# Patient Record
Sex: FEMALE | Race: WHITE | NOT HISPANIC OR LATINO | Employment: OTHER | URBAN - METROPOLITAN AREA
[De-identification: names, ages, dates, MRNs, and addresses within clinical notes are randomized per-mention and may not be internally consistent; named-entity substitution may affect disease eponyms.]

---

## 2017-05-26 ENCOUNTER — OFFICE VISIT - HEALTHEAST (OUTPATIENT)
Dept: CARDIOLOGY | Facility: CLINIC | Age: 77
End: 2017-05-26

## 2017-05-26 DIAGNOSIS — I48.0 PAROXYSMAL ATRIAL FIBRILLATION (H): ICD-10-CM

## 2017-05-26 DIAGNOSIS — G47.33 OSA ON CPAP: ICD-10-CM

## 2017-05-26 DIAGNOSIS — I48.92 ATRIAL FLUTTER, UNSPECIFIED TYPE (H): ICD-10-CM

## 2017-05-26 DIAGNOSIS — I10 ESSENTIAL HYPERTENSION WITH GOAL BLOOD PRESSURE LESS THAN 130/80: ICD-10-CM

## 2017-05-26 LAB
ATRIAL RATE - MUSE: 61 BPM
DIASTOLIC BLOOD PRESSURE - MUSE: NORMAL MMHG
INTERPRETATION ECG - MUSE: NORMAL
P AXIS - MUSE: 37 DEGREES
PR INTERVAL - MUSE: 184 MS
QRS DURATION - MUSE: 96 MS
QT - MUSE: 450 MS
QTC - MUSE: 453 MS
R AXIS - MUSE: 25 DEGREES
SYSTOLIC BLOOD PRESSURE - MUSE: NORMAL MMHG
T AXIS - MUSE: 33 DEGREES
VENTRICULAR RATE- MUSE: 61 BPM

## 2017-05-26 RX ORDER — VALSARTAN 160 MG/1
160 TABLET ORAL DAILY
Refills: 0 | Status: SHIPPED
Start: 2017-05-26 | End: 2021-09-01

## 2017-05-26 ASSESSMENT — MIFFLIN-ST. JEOR: SCORE: 1105.46

## 2019-06-03 ENCOUNTER — OFFICE VISIT - HEALTHEAST (OUTPATIENT)
Dept: CARDIOLOGY | Facility: CLINIC | Age: 79
End: 2019-06-03

## 2019-06-03 DIAGNOSIS — I48.4 ATYPICAL ATRIAL FLUTTER (H): ICD-10-CM

## 2019-06-03 DIAGNOSIS — Z98.890 STATUS POST CATHETER ABLATION OF ATRIAL FIBRILLATION: ICD-10-CM

## 2019-06-03 DIAGNOSIS — I48.0 PAROXYSMAL ATRIAL FIBRILLATION (H): ICD-10-CM

## 2019-06-03 DIAGNOSIS — I10 ESSENTIAL HYPERTENSION: ICD-10-CM

## 2019-06-03 DIAGNOSIS — G47.33 OSA ON CPAP: ICD-10-CM

## 2019-06-03 ASSESSMENT — MIFFLIN-ST. JEOR: SCORE: 1037.42

## 2019-06-04 ENCOUNTER — AMBULATORY - HEALTHEAST (OUTPATIENT)
Dept: CARDIOLOGY | Facility: CLINIC | Age: 79
End: 2019-06-04

## 2019-06-10 ENCOUNTER — COMMUNICATION - HEALTHEAST (OUTPATIENT)
Dept: CARDIOLOGY | Facility: CLINIC | Age: 79
End: 2019-06-10

## 2019-06-10 DIAGNOSIS — I48.0 PAROXYSMAL ATRIAL FIBRILLATION (H): ICD-10-CM

## 2019-10-31 ENCOUNTER — COMMUNICATION - HEALTHEAST (OUTPATIENT)
Dept: CARDIOLOGY | Facility: CLINIC | Age: 79
End: 2019-10-31

## 2019-10-31 DIAGNOSIS — I48.0 PAROXYSMAL ATRIAL FIBRILLATION (H): ICD-10-CM

## 2019-11-01 ENCOUNTER — COMMUNICATION - HEALTHEAST (OUTPATIENT)
Dept: CARDIOLOGY | Facility: CLINIC | Age: 79
End: 2019-11-01

## 2019-11-01 DIAGNOSIS — I48.0 PAROXYSMAL ATRIAL FIBRILLATION (H): ICD-10-CM

## 2019-11-01 RX ORDER — SOTALOL HYDROCHLORIDE 80 MG/1
80 TABLET ORAL 2 TIMES DAILY
Qty: 180 TABLET | Refills: 3 | Status: SHIPPED | OUTPATIENT
Start: 2019-11-01 | End: 2021-10-05

## 2019-11-04 ENCOUNTER — AMBULATORY - HEALTHEAST (OUTPATIENT)
Dept: CARDIOLOGY | Facility: CLINIC | Age: 79
End: 2019-11-04

## 2019-11-04 ENCOUNTER — SURGERY - HEALTHEAST (OUTPATIENT)
Dept: CARDIOLOGY | Facility: CLINIC | Age: 79
End: 2019-11-04

## 2019-11-04 DIAGNOSIS — I48.0 PAROXYSMAL ATRIAL FIBRILLATION (H): ICD-10-CM

## 2019-11-04 DIAGNOSIS — I48.4 ATYPICAL ATRIAL FLUTTER (H): ICD-10-CM

## 2019-11-04 ASSESSMENT — MIFFLIN-ST. JEOR: SCORE: 1021.99

## 2019-11-06 ENCOUNTER — AMBULATORY - HEALTHEAST (OUTPATIENT)
Dept: CARDIOLOGY | Facility: CLINIC | Age: 79
End: 2019-11-06

## 2019-11-06 LAB
ATRIAL RATE - MUSE: 141 BPM
DIASTOLIC BLOOD PRESSURE - MUSE: NORMAL
INTERPRETATION ECG - MUSE: NORMAL
P AXIS - MUSE: NORMAL
PR INTERVAL - MUSE: NORMAL
QRS DURATION - MUSE: 88 MS
QT - MUSE: 386 MS
QTC - MUSE: 538 MS
R AXIS - MUSE: -14 DEGREES
SYSTOLIC BLOOD PRESSURE - MUSE: NORMAL
T AXIS - MUSE: 37 DEGREES
VENTRICULAR RATE- MUSE: 117 BPM

## 2019-11-08 ENCOUNTER — AMBULATORY - HEALTHEAST (OUTPATIENT)
Dept: CARDIOLOGY | Facility: CLINIC | Age: 79
End: 2019-11-08

## 2019-11-11 ENCOUNTER — ANESTHESIA - HEALTHEAST (OUTPATIENT)
Dept: CARDIOLOGY | Facility: CLINIC | Age: 79
End: 2019-11-11

## 2019-12-04 ENCOUNTER — OFFICE VISIT - HEALTHEAST (OUTPATIENT)
Dept: CARDIOLOGY | Facility: CLINIC | Age: 79
End: 2019-12-04

## 2019-12-04 DIAGNOSIS — I48.0 PAROXYSMAL ATRIAL FIBRILLATION (H): ICD-10-CM

## 2019-12-04 DIAGNOSIS — I48.4 ATYPICAL ATRIAL FLUTTER (H): ICD-10-CM

## 2019-12-04 DIAGNOSIS — G47.33 OSA ON CPAP: ICD-10-CM

## 2019-12-04 DIAGNOSIS — Z98.890 STATUS POST CATHETER ABLATION OF ATRIAL FIBRILLATION: ICD-10-CM

## 2019-12-04 DIAGNOSIS — I10 ESSENTIAL HYPERTENSION: ICD-10-CM

## 2019-12-04 ASSESSMENT — MIFFLIN-ST. JEOR: SCORE: 1014.74

## 2019-12-10 ENCOUNTER — COMMUNICATION - HEALTHEAST (OUTPATIENT)
Dept: CARDIOLOGY | Facility: CLINIC | Age: 79
End: 2019-12-10

## 2020-07-07 ENCOUNTER — COMMUNICATION - HEALTHEAST (OUTPATIENT)
Dept: CARDIOLOGY | Facility: CLINIC | Age: 80
End: 2020-07-07

## 2020-07-08 ENCOUNTER — OFFICE VISIT - HEALTHEAST (OUTPATIENT)
Dept: CARDIOLOGY | Facility: CLINIC | Age: 80
End: 2020-07-08

## 2020-07-08 DIAGNOSIS — I10 ESSENTIAL HYPERTENSION: ICD-10-CM

## 2020-07-08 DIAGNOSIS — G47.33 OSA ON CPAP: ICD-10-CM

## 2020-07-08 DIAGNOSIS — I48.3 TYPICAL ATRIAL FLUTTER (H): ICD-10-CM

## 2020-07-08 DIAGNOSIS — Z98.890 STATUS POST CATHETER ABLATION OF ATRIAL FIBRILLATION: ICD-10-CM

## 2020-07-08 DIAGNOSIS — I48.0 PAROXYSMAL ATRIAL FIBRILLATION (H): ICD-10-CM

## 2020-07-08 RX ORDER — DULAGLUTIDE 0.75 MG/.5ML
INJECTION, SOLUTION SUBCUTANEOUS
Status: SHIPPED | COMMUNITY
Start: 2020-02-06 | End: 2021-09-01

## 2020-07-08 RX ORDER — SPIRONOLACTONE AND HYDROCHLOROTHIAZIDE 25; 25 MG/1; MG/1
TABLET ORAL DAILY
Status: SHIPPED | COMMUNITY
Start: 2020-05-26

## 2020-07-08 ASSESSMENT — MIFFLIN-ST. JEOR: SCORE: 1023.87

## 2021-05-29 NOTE — PROGRESS NOTES
[facility] HEART [facility]  Arrhythmia Clinic  Timur Corona    Referring:      Assessment:         Paroxysmal atrial fibrillation: The patient is 8 years out from her ablation procedure to treat her atrial fibrillation.  She has had some recurrence of atypical atrial flutter but none recently.  Her sotalol has gradually been weaned back.  The patient wore a 2-week monitor as part of an evaluation for episode of syncope.  She demonstrated no sustained atrial fibrillation or flutter.  She has an elevated XXX1OU0-ODGg score and remains on warfarin therapy.  She has not had any problems on this medication.    Syncope: Most likely vasovagal based on the patient's description.  There were no findings at the time of her work-up.  I have recommended the patient maintain a good daily hydration status which she admits is relatively poor currently.    Essential hypertension: The patient's blood pressure is at target on her current medical therapy    Obstructive sleep apnea: The patient remains compliant with her CPAP therapy.      Recommendations:    Patient's sotalol dose will be decreased from 120 mg twice a day to 80 mg twice a day.  She will contact us if she has recurrent symptomatic atrial flutter or fibrillation.    No change in her other medical therapy.    Follow-up in the A. fib clinic with the EP nurse practitioner in 4-6 months.        Patient Active Problem List   Diagnosis     Esophageal Reflux     Essential hypertension     Paroxysmal Atrial Fibrillation     Atrial flutter (H)     SHAVON on CPAP     Type 2 Diabetes Mellitus     Dyslipidemia     Status post catheter ablation of atrial fibrillation       Subjective:  Willow Fatima (79 y.o. female) returns to the arrhythmia clinic for interval reevaluation of her rhythm status post ablation.  The patient is approximately 8 years out from her ablation procedure to treat her atrial fibrillation.  Overall she is done well however she did have recurrence of an  atypical atrial flutter but has been successfully suppressed with sotalol therapy.  She notes no side effects from this medication which was decreased about 18 months ago.  She continues on warfarin therapy without any problems.  She states that her INRs are very steady and that she has had no bleeding problems or any other difficulties with falls or other injuries.    Current Outpatient Medications   Medication Sig Dispense Refill     allopurinol (ZYLOPRIM) 100 MG tablet Take 100 mg by mouth 2 (two) times a day.              atorvastatin (LIPITOR) 40 MG tablet Take 40 mg by mouth daily. As directed       exenatide microspheres 2 mg/0.65 mL PnIj Inject 2 mg under the skin.       glipiZIDE (GLUCOTROL) 10 MG tablet Take 10 mg by mouth 2 (two) times a day.        hydrochlorothiazide (HYDRODIURIL) 25 MG tablet Take 25 mg by mouth daily.       KLOR-CON M10 10 mEq tablet Take 10 mEq by mouth daily.  0     warfarin (COUMADIN) 2.5 MG tablet Adjust dose based on INR results as directed.       sotalol (BETAPACE) 80 MG tablet Take 1 tablet (80 mg total) by mouth 2 (two) times a day. 180 tablet 3     TRADJENTA 5 mg Tab   5     valsartan (DIOVAN) 160 MG tablet Take 1 tablet (160 mg total) by mouth daily.  0     No current facility-administered medications for this visit.        Review of Systems:   General: WNL  Eyes: WNL  Ears/Nose/Throat: WNL  Lungs: WNL  Heart: WNL  Stomach: WNL  Bladder: WNL  Muscle/Joints: WNL  Skin: WNL  Nervous System: WNL  Mental Health: WNL     Blood: WNL    Family History  Family History   Problem Relation Age of Onset     Aneurysm Mother         brain     Emphysema Father      Lung cancer Sister      Hypertension Brother      Lung cancer Sister      Lung cancer Sister      Breast cancer Sister        Social History   reports that she quit smoking about 57 years ago. She has never used smokeless tobacco. She reports that she drinks alcohol. She reports that she does not use drugs.    Objective:   Vital  "signs in last 24 hours:  /78 (Patient Site: Right Arm, Patient Position: Sitting, Cuff Size: Adult Large)   Pulse 72   Resp 20   Ht 5' 1\" (1.549 m)   Wt 140 lb (63.5 kg)   BMI 26.45 kg/m    Weight: Weight: 140 lb (63.5 kg)     Physical Exam:  General: The patient is alert oriented to person place and situation.  The patient is in no acute distress at the time of my evaluation.  Eyes: Pupils are equal, round, and reactive to light.  Conjunctiva and sclera are clear.  ENT: Oral mucosa is moist and without redness. No evident nasal discharge.  Pulmonary: Lungs are clear bilaterally with no rales, rhonchi, or wheezes.    Cardiovascular exam: Rhythm is regular. S1 and S2 are normal. No significant murmur is present. JVP is normal. Lower extremities demonstrate no significant edema. Distal pulses are intact bilaterally.  Abdomen is flat, soft, and nontender.  Musculoskeletal: Spine is straight. Extremities without deformity.  Neuro: Gait is normal.   Skin is warm, dry, and otherwise intact.      Cardiographics:   I reviewed the patient's Zio patch recording.  This demonstrated normal sinus rhythm with occasional short runs of ectopic atrial tachycardia.  Overall average heart rates were normal.  No significant bradycardia or pauses.  No atrial fibrillation or flutter.    Lab Results:   Lab Results   Component Value Date     05/26/2017    K 3.8 05/26/2017     05/26/2017    CO2 23 05/26/2017    BUN 22 05/26/2017    CREATININE 1.09 05/26/2017    CALCIUM 9.7 05/26/2017     Lab Results   Component Value Date    WBC 8.2 07/20/2011    HGB 12.5 07/20/2011    HCT 36.6 07/20/2011    MCV 87 07/20/2011     07/20/2011     Lab Results   Component Value Date    INR 2.52 (H) 07/22/2011         Outside record review:        "

## 2021-05-29 NOTE — TELEPHONE ENCOUNTER
Return call from patient.  Clarified that she should be taking 80 mg once daily and she confirms and states understanding.  She has a large supply of 80 mg tablets at home.  Discussed maintaining good hydration, especially in the beginning of the day, and she states understanding.  Pt to call with episodes of afib and reviewed contact information.

## 2021-05-29 NOTE — TELEPHONE ENCOUNTER
Patient phone note reviewed.  The symptoms reported by the patient indicate that this was not consistent with her previous atrial fibrillation or flutter symptoms.  The increased heart rate is likely secondary to the decrease in beta-blocker dose.  I also suspect that she may have some degree of dehydration that when encouraged her to increase her daily fluid intake.  Tell her that it is going to take roughly 1-2 weeks for her to adjust to the lower dose.  If she thinks she is gone back in atrial fibrillation would like to try and document that with a twelve-lead EKG.

## 2021-05-29 NOTE — TELEPHONE ENCOUNTER
Dr. Corona,     Upon further discussion, Tang changed the patients sotalol dosage to 120mg DAILY 2 years ago.  So when she saw you recently she was only taking 120mg once daily.  She has now decreased her dose further to 80mg daily confused by your instructions.  She denies any afib but doesn't have the energy she thought she would.    Now knowing she's only been on 120mg daily for a long period of time, what would you like her dose to be?    Thank you  Lindsay

## 2021-05-29 NOTE — TELEPHONE ENCOUNTER
Telephone call reviewed.  Medication clarification.  The patient had been taking sotalol 120 mg once daily.  Change her dose to 80 mg once daily.  Please reinforce to the patient that she needs to increase her fluid intake daily with a concentration on the first half of the day.

## 2021-05-29 NOTE — TELEPHONE ENCOUNTER
Dr. Corona,     Patient was seen 6/3/19 for paroxysmal afib s/p ablation 8 years ago.     Plan was to decrease sotalol from 120mg two times a day to 80mg two times a day.     Patient is calling to report significant symptoms after decreasing her dose.     She decreased her dose after seeing you on 6/3.  On 6/4 and 6/5 she was cold, clammy and weak, pulse with activity was 99.  Patient went back to her previous dose of sotalol 120mg two times a day on 6/5 and has felt well since.  When questioned, she didn't have afib or aflutter but just didn't feel good.    She is willing to try again to go back down to 80mg two times a day but is concerned she won't feel well again.    Thoughts/recommendations?    Thanks  Lindsay

## 2021-05-29 NOTE — TELEPHONE ENCOUNTER
----- Message from Janette Falcon sent at 6/10/2019  8:31 AM CDT -----  Contact: pt  The medication or refill issue is below:    Primary Cardiologist: Dr. Corona  Medication: Sotalol  Issue / Concern: Rx change, please call back.     Best Phone Number for Patient: [phone], cell phone not avail at 9:30am today but then afterward.

## 2021-05-31 VITALS — HEIGHT: 61 IN | WEIGHT: 155 LBS | BODY MASS INDEX: 29.27 KG/M2

## 2021-06-02 NOTE — TELEPHONE ENCOUNTER
Pt call reporting onset of AFL  Pt reports not knowing when the exact onset of AFL was, as she went into her PMD office on 10/29/19 where PMD confirmed AFL by EKG.   Pt reports experiencing symptoms during episode(s), but are currently tolerable at this time  Pt reports symptoms as fatigue/tiered  Pt recently had s/s of URI, and was seen by PMD on 10/29. She was dx with URI, had CXR, and was started on 7 days course of anbx. Discussed with pt that illness can trigger onset of AF/AFL, which pt was aware of.  Pts HRs during the episode(s) 120's  Pts current /80's, which is pts baseline  Pt does not have a pacemaker/ICD to verify current rhythm   Will obtain rhythm strip from PMD office, as the dictated report in care everywhere states ST.  Of note, pts PMD did contact her today after reviewing her EKG showing AFL in the 120's and told her to take an extra dose of Sotalol 80mg today. Pt took her 80mg dose this AM and took another dose 80mg 1-2 hrs ago. I discuss with pt that this was not a medication that can be taken PRN, and reviewed reasoning. Pt was instructed to remain on 80mg Daily.    Problem History Diagnosis: AF and AFL  AKVH3D4 VASC Score: 7  Pertinent past surgical/medical history: History of PVI from 2011, with documented reoccurance of atypical AFL. Last CV in 2013. Pt has failed Sotalol and Flecainide in the past.  Next Clinic Apt or Planned follow up apt: 11/14/19 with Dr Corona  Medication: Pts medication list and allergy list is current and listed below for reference   Anticoagulation: Pt currently taking Warfarin as prescribed, and reports no interruption in therapy and INR within range- INR history will be obtained  Antiarrhythmic Medications: Sotalol as prescribed below  Instructions/Information reviewed with patient: AF/AFL and stroke education provided to pt, reassurance was given to pt of non-life threatening arrhythmia, instructed to continue current medication as prescribed, call if  symptoms persist or worsen, to contact the EP nursing office if conversion back to SR, information would be sent to provider for review and would be contacted with further recommendations, contact information provided to pt and pt verbalized understanding    Please advise  Thank you,  Karina Maguire RN  11/1/2019 1:47 PM    Allergies   Allergen Reactions     Ace Inhibitors        Current Outpatient Medications:      allopurinol (ZYLOPRIM) 100 MG tablet, Take 100 mg by mouth 2 (two) times a day.    , Disp: , Rfl:      atorvastatin (LIPITOR) 40 MG tablet, Take 40 mg by mouth daily. As directed, Disp: , Rfl:      exenatide microspheres 2 mg/0.65 mL PnIj, Inject 2 mg under the skin., Disp: , Rfl:      glipiZIDE (GLUCOTROL) 10 MG tablet, Take 10 mg by mouth 2 (two) times a day. , Disp: , Rfl:      hydrochlorothiazide (HYDRODIURIL) 25 MG tablet, Take 25 mg by mouth daily., Disp: , Rfl:      KLOR-CON M10 10 mEq tablet, Take 10 mEq by mouth daily., Disp: , Rfl: 0     sotalol (BETAPACE) 80 MG tablet, Take 1 tablet (80 mg total) by mouth daily., Disp: 90 tablet, Rfl: 1     TRADJENTA 5 mg Tab, , Disp: , Rfl: 5     valsartan (DIOVAN) 160 MG tablet, Take 1 tablet (160 mg total) by mouth daily., Disp: , Rfl: 0     warfarin (COUMADIN) 2.5 MG tablet, Adjust dose based on INR results as directed., Disp: , Rfl:     ----- Message from Janette Falcon sent at 11/1/2019 12:59 PM CDT -----  Regarding: PETER  Caller: Willow    Primary cardiologist: Dr. Corona    Detailed reason for call: Willow states she's had aflutter, possibly due to an infection but she asks for a call back.     Best phone number: [phone]    Best time to contact: Today    Ok to leave a detailed message? Yes    Device? No    Additional Info: Patient has appt scheduled with Dr. Corona on 11/13/19.

## 2021-06-02 NOTE — TELEPHONE ENCOUNTER
She should be taking sotalol 80 mg two times a day, so if she has only been taking it once daily and has a URI, breakthrough is not surprising.  Call on Monday if still in AFl and will increase sotalol.  Thanks,  Charisma

## 2021-06-02 NOTE — TELEPHONE ENCOUNTER
Pt was called, corresponding information/recommendations reviewed, verbalized understanding, has no further questions at this time, contact information was given for further concerns/questions, scheduling notified to contact pt, order(s) were placed, RX was sent to pt pharmacy and medication list updated   Pt again confirmed she was only taking Sotalol 80mg Daily, and will increase Sotalol to 80mg two times a day tomorrow as she took an extra dose already today.  Pt will come in for EKG on Monday at 9:30 for QTc check with increased dose.   11/1/2019 3:25 PM  Karina Maguire RN

## 2021-06-03 VITALS
BODY MASS INDEX: 25.79 KG/M2 | SYSTOLIC BLOOD PRESSURE: 94 MMHG | WEIGHT: 136.6 LBS | RESPIRATION RATE: 24 BRPM | HEIGHT: 61 IN | DIASTOLIC BLOOD PRESSURE: 70 MMHG | HEART RATE: 117 BPM

## 2021-06-03 VITALS — BODY MASS INDEX: 26.43 KG/M2 | HEIGHT: 61 IN | WEIGHT: 140 LBS

## 2021-06-03 NOTE — ANESTHESIA PREPROCEDURE EVALUATION
Anesthesia Evaluation      Patient summary reviewed   No history of anesthetic complications     Airway   Mallampati: II  Neck ROM: full   Pulmonary     breath sounds clear to auscultation  (+) sleep apnea on CPAP, , a smoker (quit 1961)                         Cardiovascular   Exercise tolerance: > or = 4 METS  (+) hypertension, dysrhythmias, , hypercholesterolemia,     ECG reviewed  Rhythm: irregular        Neuro/Psych    CVA: TIA - 6 yrs ago. No issues since.    Endo/Other    (+) diabetes mellitus type 2,      GI/Hepatic/Renal    (+) GERD,        Other findings:     NPO > 8 hrs     Results for FABIENNE ASCENCIO (MRN [id number]) as of 11/11/2019 11/11/2019 12:28  POC INR: 2.60 (!)        Dental    (+) poor dentition                       Anesthesia Plan  Planned anesthetic: general mask    ASA 3   Induction: intravenous   Anesthetic plan and risks discussed with: patient and spouse    Post-op plan: routine recovery

## 2021-06-03 NOTE — ANESTHESIA POSTPROCEDURE EVALUATION
Patient: Willow Fatima  * No procedures listed *  Anesthesia type: general    Patient location: Telemetry/Step Down Unit  Last vitals:   Vitals Value Taken Time   /62 11/11/2019  1:55 PM   Temp  11/11/2019  1:59 PM   Pulse 65 11/11/2019  1:58 PM   Resp 17 11/11/2019  1:58 PM   SpO2 100 % 11/11/2019  1:58 PM   Vitals shown include unvalidated device data.  Post vital signs: stable  Level of consciousness: awake and responds to simple questions  Post-anesthesia pain: pain controlled  Post-anesthesia nausea and vomiting: no  Pulmonary: unassisted, return to baseline  Cardiovascular: stable and blood pressure at baseline  Hydration: adequate  Anesthetic events: no    QCDR Measures:  ASA# 11 - Larissa-op Cardiac Arrest: ASA11B - Patient did NOT experience unanticipated cardiac arrest  ASA# 12 - Larissa-op Mortality Rate: ASA12B - Patient did NOT die  ASA# 13 - PACU Re-Intubation Rate: NA - No ETT / LMA used for case  ASA# 10 - Composite Anes Safety: ASA10A - No serious adverse event    Additional Notes:

## 2021-06-03 NOTE — ANESTHESIA CARE TRANSFER NOTE
Last vitals:   Vitals:    11/11/19 1229   Pulse: (!) 123   Resp: 16   Temp: 36.6  C (97.8  F)   SpO2: 97%     Patient's level of consciousness is drowsy  Spontaneous respirations: yes  Maintains airway independently: yes  Dentition unchanged: yes  Oropharynx: oropharynx clear of all foreign objects    QCDR Measures:  ASA# 20 - Surgical Safety Checklist: WHO surgical safety checklist completed prior to induction    PQRS# 430 - Adult PONV Prevention: NA - Not adult patient, not GA or 3 or more risk factors NOT present  ASA# 8 - Peds PONV Prevention: NA - Not pediatric patient, not GA or 2 or more risk factors NOT present  PQRS# 424 - Larissa-op Temp Management: NA - MAC anesthesia or case < 60 minutes  PQRS# 426 - PACU Transfer Protocol: - Transfer of care checklist used  ASA# 14 - Acute Post-op Pain: ASA14B - Patient did NOT experience pain >= 7 out of 10

## 2021-06-03 NOTE — PROGRESS NOTES
Pt comes to clinic for ekg after increasing Sotalol to 80mg two times a day.  EKG shows atrial flutter @117,  QTc 538.      Pt reports that she is feeling better, her sinus infection is starting to clear and she only has one more dose of antibiotics left.  She notes feeling very tired, she states that her symptoms are not like afib, do feel like atrial flutter.    Pt denies chest pain, light headedness, dizziness or shortness of breath.    Pt has been compliant with Warfarin, INR's are as follows:    11-1-19    2.1  10-29-19  2.1  10-16-19  2.1  9-17-19    2.4    Reviewed with Charisma Freeman.  Pt to continue current medications, set up for cardioversion.  Will reassess QT/QTc at cardioversion.    Reviewed with patient, she states understanding and is in agreement with plan.  Will place orders and ask our cardioversion coordinator to help set up cardioversion for patient as soon as possible.    Cardioversion packet given to patient for review, she has had cardioversions in the past and is familiar with the process.

## 2021-06-03 NOTE — PROGRESS NOTES
1940  Home:[phone] (home) Cell:[phone] (mobile)  Emergency Contact: Jesus Fatima [phone]    +++Important patient information for CSC/Cath Lab staff : PT IS ON COUMADIN,IS SHAVON/CPAP, PT ON SOTALOL AND PT IS DIABETIC+++    [facility] EP Cath Lab Procedure Order     Cardioversion:  Cardioversion     INR'S ARE IN CHART FROM PMD     9/17=2.4  10/6=2.1  10/29=2.1  11/1=2.1    Diagnosis:  AF  Anticipated Case Duration:  Standard  Scheduling Needs/Timeframe:  PT IS SET FOR MONDAY 11/11/2019 AT 12 WITH CHARISMA FREEMAN CNP    Current Device: None None  Device Company/Device Rep Needed for Procedure: None    Anesthesia:  General-CV Only  Research Protocol:  No    [facility] EP Cath Lab Prep   Ordering Provider: Charisma Freeman NP  Ordering Date: 11/4/2019  Orders Status: Intial order placed and Order set placed    H&P:  Compled by PMD DR DESHAUN MCMANUS NURSE PRACTIONER  LILY GUAN on 11/6/2019 if scheduled within 30 days, pt to schedule with PMD if procedure outside of this timeframe  PCP: Deshaun Mcmanus MD, [phone]    Pre-op Labs: N/A for procedure    Medical Records Pertinent for Procedure:  N/A    Patient Education:    PT HAS A  FOR PROCEDURE THAT WILL STAY WITH HER AND BE PRESENT FOR POST CV 24 HR MONITORING   PT INSTRUCTED TO HOLD ANY VITAMINS,MINERALS, CALCIUM, IRON OR SUPPLEMENTS THE MORNING OF CV  PT INSTRUCTED NO GUM CHEWING, NO MINTS OR CANDY THE MORNING OF CV  PT INSTRUCTED TO LEAVE JEWELRY AT HOME  PT INSTRUCTED TO BATHE OR SHOWER BEFORE COMING IN   PT IS ON COUMADIN  PT IS SOTALOL  PT HAS A FOLLOW UP WITH DR ROWE 11/13/2019 AND IS TO KEEP THAT   PT IS OAS/CPAP  PT IS DIABETIC AND INSTRUCTED PER CHARISMA TO HOLD GLIPIZIDE AND JARDIANCE THE MORNING OF CV  PT'S H&P IS SCANNED INTO Epic UNDER MEDIA TAB FROM PMD DR MCMANUS     Teach with Patient: Completed via phone on 11/5/2019    Risks Reviewed:     Cardioversion    >90% acute success rate, <10% failure to convert or   reverts shortly after  cardioversion.    <1% embolic event of (CVA, pulmonary embolism, or   1. other site).    75% risk for superficial burn.  Risks associated with general anesthesia will be addressed by the Anesthesiology Department    Pre-Procedure Instructions that were Reviewed with Patient:  NPO after midnight, Remove all jewelry prior to coming in for procedure, Shower prior to arrival, Transportation arrangements needed s/p procedure, Post-procedure follow up process and Sedation plan/orders    Pre-Procedure Medication Instructions:  Instructions given to pt regarding anticoagulants: Coumadin- instructed to continue anticoagulation uninterrupted through their procedure  Instructions given to pt regarding antiarrhythmic medication: Sotalol; Pt instructed to continue medication prior to procedure  Instructions for medication, other than anticoagulants/antiarrhythmics listed above, given to pt: to take all morning medications with small sips of water, with the exception of OTC supplements and MVI    Allergies   Allergen Reactions     Ace Inhibitors        Current Outpatient Medications:      allopurinol (ZYLOPRIM) 100 MG tablet, Take 100 mg by mouth 2 (two) times a day.    , Disp: , Rfl:      atorvastatin (LIPITOR) 40 MG tablet, Take 40 mg by mouth daily. As directed, Disp: , Rfl:      empagliflozin (JARDIANCE) 10 mg Tab, Take 10 mg by mouth daily., Disp: , Rfl:      exenatide microspheres 2 mg/0.65 mL PnIj, Inject 2 mg under the skin., Disp: , Rfl:      glipiZIDE (GLUCOTROL) 10 MG tablet, Take 10 mg by mouth 2 (two) times a day. , Disp: , Rfl:      hydrochlorothiazide (HYDRODIURIL) 25 MG tablet, Take 25 mg by mouth daily., Disp: , Rfl:      KLOR-CON M10 10 mEq tablet, Take 10 mEq by mouth daily., Disp: , Rfl: 0     sotalol (BETAPACE) 80 MG tablet, Take 1 tablet (80 mg total) by mouth 2 (two) times a day., Disp: 180 tablet, Rfl: 3     TRADJENTA 5 mg Tab, , Disp: , Rfl: 5     valsartan (DIOVAN) 160 MG tablet, Take 1 tablet (160 mg  total) by mouth daily., Disp: , Rfl: 0     warfarin (COUMADIN) 2.5 MG tablet, Adjust dose based on INR results as directed., Disp: , Rfl:     Documentation Date:11/6/2019 2:54 PM  Celeste Valencia LPN

## 2021-06-04 VITALS
WEIGHT: 139 LBS | DIASTOLIC BLOOD PRESSURE: 70 MMHG | BODY MASS INDEX: 27.29 KG/M2 | RESPIRATION RATE: 12 BRPM | HEIGHT: 60 IN | OXYGEN SATURATION: 96 % | HEART RATE: 57 BPM | SYSTOLIC BLOOD PRESSURE: 138 MMHG

## 2021-06-04 VITALS
SYSTOLIC BLOOD PRESSURE: 138 MMHG | DIASTOLIC BLOOD PRESSURE: 70 MMHG | RESPIRATION RATE: 12 BRPM | WEIGHT: 135 LBS | BODY MASS INDEX: 25.49 KG/M2 | HEART RATE: 64 BPM | HEIGHT: 61 IN

## 2021-06-04 NOTE — TELEPHONE ENCOUNTER
Spoke with patient, she confirms she did pass out last evening and she is unsure if she hit her head or not.    I have advised her to see urgent/emergent care to be evaluated.  Especially given she is on warfarin.     Patient is agreeable and verbalizes understanding.   JW

## 2021-06-04 NOTE — PATIENT INSTRUCTIONS - HE
Willow Fatima,    It was a pleasure to see you today at the [facility].     My recommendations after this visit include:    Continue current medications.    Call if you have recurrence of A fib.    Follow up in 3 months    Charisma Freeman, CNP  [facility], Electrophysiology  [phone]  EP nurses [phone]

## 2021-06-04 NOTE — TELEPHONE ENCOUNTER
----- Message from Hatch sent at 12/10/2019  2:33 PM CST -----  Regarding: Sx  General phone call:    Caller: Willow  Primary cardiologist: Cj  Detailed reason for call: Last night, Willow passed out in her bathroom. She would like to discuss that with Dr. Corona's team and what she should do next.     Best phone number: [phone]  Best time to contact: today  Ok to leave a detailed message? yes  Device? no    Additional Info:

## 2021-06-09 NOTE — TELEPHONE ENCOUNTER
Wellness Screening Tool  Symptom Screening:  Do you have one of the following NEW symptoms:    Fever (subjective or >100.0)?  No    A new cough?  No    Shortness of breath?  No     Chills? No     New loss of taste or smell? No     Generalized body aches? No     New persistent headache? No     New sore throat? No     Nausea, vomiting, or diarrhea?  No    Within the past 3 weeks, have you been exposed to someone with a known positive illness below:    COVID-19 (known or suspected)?  No    Chicken pox?  No    Mealses?  No    Pertussis?  No    Patient notified of visitor policy- They may have one person accompany them to their appointment, but they will need to wear a mask and will be screened upon arrival for symptoms: Yes  Pt informed to wear a mask: Yes  Pt notified if they develop any symptoms listed above, prior to their appointment, they are to call the clinic directly at [phone] for further instructions.  Yes  Patient's appointment status: Patient will be seen in clinic as scheduled on 7-8-20

## 2021-06-10 NOTE — PROGRESS NOTES
[facility] HEART [facility]   Arrhythmia Clinic    Assessment/Plan:  Diagnoses and all orders for this visit:    Paroxysmal atrial fibrillation with reoccurrence outside of the 3 months window of repeat PVI.  She has been on sotalol for about 4 yrs since repeat PVI.  I discussed with Willow that I see her kidney function has been creeping up the last 3 years.  Her last creatinine was 1.24 and creatinine clearance estimated would be 43.  Sotalol is contraindicated if her estimated GFR is less than 40.  I recommend that she switch to renal dose sotalol to start with.  I recommended basic metabolic profile today so that I can see if she is safe to stay on sotalol over the next year.  If creatinine is further increased from last summer will need to switch to flecainide plus metoprolol and would have to do stress echo before start of flecainide or could switch to amiodarone and discussed more potential side effects with this medication but good antiarrhythmic.  I discussed 1% risk of pulmonary fibrosis with amiodarone.  She would likely be able to be maintained on low-dose amiodarone.  For now recommended to decrease sotalol to 80 mg 1-1/2 tablets orally every day.  Will await basic metabolic profile and call her with results on Tuesday to determine antiarrhythmic plan for the next year.  To follow-up with Dr. Corona in 1 year or sooner if needed.  To call if increased episodes of atrial fib.  QTC checked today and ok.  -     ECG Clinic - Today  -     Basic Metabolic Panel  -     sotalol (BETAPACE) 80 MG tablet; Take 1.5 tablets (120 mg total) by mouth daily.  Dispense: 180 tablet; Refill: 0    Atrial flutter, unspecified type and treated with PVI in the past.    SHAVON on CPAP and using consistently.    Essential hypertension with goal blood pressure less than 130/80 well-controlled.  -     valsartan (DIOVAN) 160 MG tablet; Take 1 tablet (160 mg total) by mouth daily.; Refill: 0    Other orders  -     exenatide microspheres 2  mg/0.65 mL PnIj; Inject 2 mg under the skin.    Addendum:  This afternoon I called Ismael with results of basic metabolic profile and creatinine some improved but still abnormal at 1.09 and had been 1.24 last year.  Safe to stay on sotalol at renal dose.      ______________________________________________________________________    Subjective:    I had the opportunity to see Willow Fatima at the [facility]. Willow Fatima is a 77 y.o. female and here for EP follow-up regarding paroxysmal A. fib and atrial flutter .  Willow Fatima had pulmonary vein isolation ablation ×2 with last in July 2011.  Stacey had reoccurrence of A. fib in February of 2013 and required cardioversion.   Willow Fatima has a known history of TIAs and was restarted on warfarin.  She denies any symptoms of atrial fib over the last year and remains on sotalol 80 twice daily.  She denies any cardiac symptoms on questioning.  She feels her energy level and tolerance of activity is stable.  She tells me that she is aware that her kidney function has gotten worse over the last year and believes it is related to poor control of diabetes.  Her A1c has been 8.5.  She is going to be seen in primary clinic for reassessment in the next few months.  I think she may need closer following there.    ______________________________________________________________________    Problem List:  Patient Active Problem List   Diagnosis     Esophageal Reflux     Essential hypertension     Paroxysmal Atrial Fibrillation     Atrial flutter     SHAVON on CPAP     Type 2 Diabetes Mellitus     Hyperlipidemia     Medical History:  No past medical history on file.  Surgical History:  Past Surgical History:   Procedure Laterality Date     WI ABLATE HEART DYSRHYTHM FOCUS      Description: Catheter Ablation Atrial Fibrillation;  Recorded: 06/20/2013;  Comments: PVI 3-7-2011 (PVI/cryo + CTI line); ; Repeat PVI July 2011 (PVI/RF + CFE + Roof line + GREGORIO line)      CT ABLATE HEART DYSRHYTHM FOCUS      Description: Catheter Ablation Atrial Fibrillation;  Recorded: 08/05/2014;  Comments: PVI 3-7-2011 (PVI/cryo + CTI line); ; Repeat PVI July 2011 (PVI/RF + CFE + Roof line + GREGORIO line)     CT CARDIOVERSION ELECTIVE ARRHYTHMIA EXTERNAL      Description: Elective Cardioversion External;  Recorded: 03/07/2013;  Comments: had in past and 10/17/12; ; 2/15/13 for afl     CT CARDIOVERSION ELECTIVE ARRHYTHMIA EXTERNAL      Description: Elective Cardioversion External;  Recorded: 08/05/2014;  Comments: Fib 10/17/12; ; Flutter 2/15/13     CT REMOVE TONSILS/ADENOIDS,<11 Y/O      Description: Tonsillectomy With Adenoidectomy;  Recorded: 10/17/2012;     CT REVISE MEDIAN N/CARPAL TUNNEL SURG      Description: Neuroplasty Decompression Median Nerve At Carpal Tunnel;  Recorded: 10/17/2012;     Social History:  Social History   Substance Use Topics     Smoking status: Former Smoker     Quit date: 4/13/1962     Smokeless tobacco: None     Alcohol use Yes      Comment: 6 per month        Review of Systems: Review of Systems:   General: WNL  Eyes: WNL  Ears/Nose/Throat: WNL  Lungs: WNL  Heart: WNL  Stomach: WNL  Bladder: WNL  Muscle/Joints: WNL  Skin: WNL  Nervous System: WNL  Mental Health: WNL     Blood: WNL      Family History:  Family History   Problem Relation Age of Onset     Aneurysm Mother      brain     Emphysema Father      Lung cancer Sister      Hypertension Brother      Lung cancer Sister      Lung cancer Sister      Breast cancer Sister          Allergies:  Allergies   Allergen Reactions     Ace Inhibitors      Medications:  Current Outpatient Prescriptions   Medication Sig Dispense Refill     allopurinol (ZYLOPRIM) 100 MG tablet Take 200 mg by mouth daily.       atorvastatin (LIPITOR) 40 MG tablet Take 40 mg by mouth daily. As directed       exenatide microspheres 2 mg/0.65 mL PnIj Inject 2 mg under the skin.       glipiZIDE (GLUCOTROL) 10 MG tablet Take 10 mg by mouth 2 (two) times a  "day.        hydrochlorothiazide (HYDRODIURIL) 25 MG tablet Take 25 mg by mouth daily.       KLOR-CON M10 10 mEq tablet Take 10 mEq by mouth daily.  0     sotalol (BETAPACE) 80 MG tablet Take 1.5 tablets (120 mg total) by mouth daily. 180 tablet 0     TRADJENTA 5 mg Tab   5     warfarin (COUMADIN) 2.5 MG tablet Adjust dose based on INR results as directed.       valsartan (DIOVAN) 160 MG tablet Take 1 tablet (160 mg total) by mouth daily.  0     No current facility-administered medications for this visit.        Objective:   Vital signs:  /72 (Patient Site: Right Arm, Patient Position: Sitting, Cuff Size: Adult Regular)  Pulse 60  Resp 16  Ht 5' 1\" (1.549 m)  Wt 155 lb (70.3 kg)  BMI 29.29 kg/m2      Physical Exam:    GENERAL APPEARANCE: Alert, cooperative and in no acute distress.  HEENT: No scleral icterus. No Xanthelasma. Oral mucuos membranes pink and moist.  NECK: JVP Nl.   CHEST: clear to auscultation  CARDIOVASCULAR: S1, S2 without murmur ,clicks or rubs. Regular, regular.  Radial and posterior tibial pulses are intact and symetric.   EXTREMITIES: No cyanosis, clubbing or edema.    Results personally reviewed:   7/11 ECHO EF NL AND NO SIGN VALVE DISEASE.         Results for orders placed or performed in visit on 05/26/17   ECG Clinic - Today   Result Value Ref Range    SYSTOLIC BLOOD PRESSURE  mmHg    DIASTOLIC BLOOD PRESSURE  mmHg    VENTRICULAR RATE 61 BPM    ATRIAL RATE 61 BPM    P-R INTERVAL 184 ms    QRS DURATION 96 ms    Q-T INTERVAL 450 ms    QTC CALCULATION (BEZET) 453 ms    P Axis 37 degrees    R AXIS 25 degrees    T AXIS 33 degrees    MUSE DIAGNOSIS       Normal sinus rhythm  Normal ECG  When compared with ECG of 15-FEB-2013 13:27,  No significant change was found         TSH: No results found for: TSH  BNP: No results found for: BNP  BMP:  Lab Results   Component Value Date    CREATININE 0.98 07/20/2011    BUN 18 07/20/2011     07/20/2011    K 4.5 02/15/2013     07/20/2011    " CO2 27 07/20/2011       This note has been dictated using voice recognition software. Any grammatical or context distortions are unintentional and inherent to the software.    STEPHANIE BILLY RN, [facility] HEART [facility]  [phone]

## 2021-06-28 NOTE — PROGRESS NOTES
Progress Notes by Charisma Freeman CNP at 12/4/2019 10:30 AM     Author: Charisma Freeman CNP Service: -- Author Type: Nurse Practitioner    Filed: 12/4/2019 11:21 AM Encounter Date: 12/4/2019 Status: Signed    : Charisma Freeman CNP (Nurse Practitioner)             Assessment/Recommendations   Assessment/Plan:    1.  Paroxysmal atrial Fibrillation and persistent atypical atrial flutter: Significant symptomatic improvement since cardioversion of atrial flutter on 11/11/2019.  No evidence of atrial fibrillation since second PVI in July 2011.  We reviewed the physiology and natural progression of atrial arrhythmias and treatment options.  This episode of atrial flutter was likely triggered by upper respiratory infection.  QTc interval remains well within safety parameters.  Continue sotalol 80 mg twice daily.    She was reassured that atrial fibrillation is not life-threatening, but carries an increased risk for stroke.  She has a [id number] score of 5 for age >75, female gender, hypertension, diabetes.  Continue current for stroke prophylaxis as managed through her primary clinic.    2.  Hypertension: Blood pressure at target today.  Continue valsartan and hydrochlorothiazide in addition to sotalol.    3.  Obstructive sleep apnea: Consistent use of CPAP nightly.  She feels that it is no longer fitting correctly and is the process of having it reevaluated.  We reviewed the correlation between sleep apnea and atrial arrhythmias.    Follow up in 3 months     History of Present Illness/Subjective    Ms. Willow Fatima is a 79 y.o. female who comes in today for EP follow-up of atrial fibrillation and flutter.  She has a history of paroxysmal atrial fibrillation for which she underwent pulmonary vein isolation ablation in March 2011 with repeat PVI in July 2011.  She had recurrence of atypical atrial flutter in 2013 which she underwent cardioversion on 2/15/2013.  She has since been maintained on sotalol, but at some  point decrease to taking it once daily.  She upper respiratory infection and developed recurrence of atrial flutter on 10/29/2019.  Symptoms consist primarily of significant fatigue.  Sotalol was increased to 80 mg twice daily, but she remained in atrial flutter.  She underwent cardioversion on 11/11/2019.  She also has a history of hypertension, hyperlipidemia, type II diabetes, and obstructive sleep apnea for which she wears CPAP nightly.  She is on long-term oral anticoagulation therapy with warfarin.    Willow states that she feels much better since her cardioversion.  She has not had any symptomatic recurrence of arrhythmia or elevated heart rates.  She denies chest discomfort, palpitations, abdominal fullness/bloating or peripheral edema, shortness of breath, paroxysmal nocturnal dyspnea, orthopnea, lightheadedness, dizziness, pre-syncope, or syncope.    Cardiographics (EKGs personally reviewed):  EKG done 11/11/2019, post cardioversion, shows sinus rhythm at 69 bpm, QT/QTc interval measures 444/475 ms  EKG done 11/4/2019 shows atrial flutter with 2: 1 conduction at 117 bpm       Physical Examination Review of Systems   Vitals:    12/04/19 1029   BP: 138/70   Pulse: 64   Resp: 12     Body mass index is 25.51 kg/m .  Wt Readings from Last 3 Encounters:   12/04/19 135 lb (61.2 kg)   11/11/19 135 lb 8 oz (61.5 kg)   11/04/19 136 lb 9.6 oz (62 kg)       General Appearance:   Alert, well-appearing and in no acute distress.   HEENT: Atraumatic, normocephalic.  No scleral icterus, normal conjunctivae, EOMs intact, PERRL.  Mucous membranes pink and moist.     Chest/Lungs:   Chest symmetric, spine straight.  Respirations unlabored.  Lungs are clear to auscultation.   Cardiovascular:   Regular rate and rhythm.  Normal first and second heart sounds with no murmurs, rubs, or gallops; radial and posterior tibial pulses are intact, No edema.   Abdomen:  Soft, nondistended, bowel sounds present.   Extremities: No cyanosis  or clubbing.   Musculoskeletal: Moves all extremities.  Gait steady.   Skin: Warm, dry, intact.    Neurologic: Mood and affect are appropriate.  Alert and oriented to person, place, time, and situation.    General: WNL  Eyes: WNL  Ears/Nose/Throat: WNL  Lungs: WNL  Heart: WNL  Stomach: WNL  Bladder: WNL  Muscle/Joints: WNL  Skin: WNL  Nervous System: WNL  Mental Health: WNL     Blood: WNL       Medical History  Surgical History Family History Social History   Past Medical History:   Diagnosis Date   ? Atrial flutter (H)     Atypical flutter with RVR after second PVI in July 2011     ? Esophageal reflux     Created by Conversion    ? Essential hypertension         ? Paroxysmal Atrial Fibrillation     [id number] score of 7-on chronic warfarin Previously failed Sotalol and Multaq therapy PVI 3/11 Repeat PVI 7/11  Recurrent A fib, most recent CV Feb 2013 Rx sotalol    ? Status post catheter ablation of atrial fibrillation 6/3/2019    PVI 3/11(PVI/Cryo + CTI line) Repeat PVI 7/11 (PVI/RF + CFE + Roof line + GREGORIO line)    Past Surgical History:   Procedure Laterality Date   ? MS ABLATE HEART DYSRHYTHM FOCUS      Description: Catheter Ablation Atrial Fibrillation;  Recorded: 06/20/2013;  Comments: PVI 3-7-2011 (PVI/cryo + CTI line); ; Repeat PVI July 2011 (PVI/RF + CFE + Roof line + GREGORIO line)   ? MS ABLATE HEART DYSRHYTHM FOCUS      Description: Catheter Ablation Atrial Fibrillation;  Recorded: 08/05/2014;  Comments: PVI 3-7-2011 (PVI/cryo + CTI line); ; Repeat PVI July 2011 (PVI/RF + CFE + Roof line + GREGORIO line)   ? MS CARDIOVERSION ELECTIVE ARRHYTHMIA EXTERNAL       Fib 10/17/12; ; Flutter 2/15/13; AFl 11/11/2019   ? MS REMOVE TONSILS/ADENOIDS,<11 Y/O      Description: Tonsillectomy With Adenoidectomy;  Recorded: 10/17/2012;   ? MS REVISE MEDIAN N/CARPAL TUNNEL SURG      Description: Neuroplasty Decompression Median Nerve At Carpal Tunnel;  Recorded: 10/17/2012;    Family History   Problem Relation Age of Onset   ?  Aneurysm Mother         brain   ? Emphysema Father    ? Lung cancer Sister    ? Hypertension Brother    ? Lung cancer Sister    ? Lung cancer Sister    ? Breast cancer Sister     Social History     Tobacco Use   ? Smoking status: Former Smoker     Last attempt to quit: 1962     Years since quittin.6   ? Smokeless tobacco: Never Used   Substance Use Topics   ? Alcohol use: Yes     Comment: 6 per month   ? Drug use: No          Medications  Allergies   Current Outpatient Medications   Medication Sig Dispense Refill   ? allopurinol (ZYLOPRIM) 100 MG tablet Take 100 mg by mouth 2 (two) times a day.            ? atorvastatin (LIPITOR) 40 MG tablet Take 40 mg by mouth daily. As directed     ? empagliflozin (JARDIANCE) 10 mg Tab Take 10 mg by mouth daily.     ? glipiZIDE (GLUCOTROL) 10 MG tablet Take 10 mg by mouth 2 (two) times a day.      ? hydrochlorothiazide (HYDRODIURIL) 25 MG tablet Take 25 mg by mouth daily.     ? potassium chloride (KLOR-CON) 10 MEQ CR tablet Take 20 mEq by mouth 2 (two) times a day.     ? sotalol (BETAPACE) 80 MG tablet Take 1 tablet (80 mg total) by mouth 2 (two) times a day. 180 tablet 3   ? valsartan (DIOVAN) 160 MG tablet Take 1 tablet (160 mg total) by mouth daily.  0   ? warfarin (COUMADIN) 2.5 MG tablet Adjust dose based on INR results as directed.       No current facility-administered medications for this visit.     Allergies   Allergen Reactions   ? Ace Inhibitors Cough         Lab Results    Chemistry/lipid CBC Other   Lab Results   Component Value Date    CREATININE 1.09 2017    BUN 22 2017     2017    K 3.8 2017     2017    CO2 23 2017     Creatinine (mg/dL)   Date Value   2017 1.09   2011 0.98   2011 0.92   2011 0.95       No results found for: CHOL, HDL, LDL Lab Results   Component Value Date    WBC 8.2 2011    HGB 12.5 2011    HCT 36.6 2011    MCV 87 2011     2011     No results found for: CKTOTAL, CKMB, CKMBINDEX, TROPONINI, BNP, TSH  Lab Results   Component Value Date    INR 2.60 (!) 11/11/2019    INR 2.52 (H) 07/22/2011    INR 2.33 (H) 07/21/2011        This note has been dictated using voice recognition software. Any grammatical, typographical, or context distortions are unintentional and inherent to the software.

## 2021-06-29 NOTE — PROGRESS NOTES
Progress Notes by Timur Corona MD at 7/8/2020  1:10 PM     Author: Timur Corona MD Service: -- Author Type: Physician    Filed: 7/8/2020  2:38 PM Encounter Date: 7/8/2020 Status: Signed    : Timur Corona MD (Physician)       [facility]  Arrhythmia Clinic  Timur Corona    Referring:      Assessment:         Paroxysmal atrial fibrillation: The patient is 9 years out from her ablation procedure to treat her atrial fibrillation.  She has had recurrence of her arrhythmia which largely has been manifest as atypical atrial flutter most recently in November 2019.  This episode was associated with an acute viral illness and spontaneously converted.  The patient continues to tolerate sotalol therapy well.  The patient has an elevated [id number] score the patient is on oral anticoagulant therapy which again she is tolerating.    Obstructive sleep apnea: The patient is compliant with her CPAP therapy.  She recently had some of her fittings replaced and adjusted and states that this is working well.    Essential hypertension: The patient's blood pressure is at target today on her current medical therapy.      Recommendations:    No new medication changes today.    The patient will be scheduled back in the A. fib clinic with the EP nurse practitioner in 12 months.  The patient will contact us in the interim if she has new symptoms.      Patient Active Problem List   Diagnosis   ? Esophageal Reflux   ? Essential hypertension   ? Paroxysmal Atrial Fibrillation   ? Atrial flutter (H)   ? SHAVON on CPAP   ? Type 2 Diabetes Mellitus   ? Dyslipidemia   ? Status post catheter ablation of atrial fibrillation       Subjective:  Willow Fatima (80 y.o. female) returns to the arrhythmia clinic for interval reevaluation of her atrial arrhythmias post ablation.  The patient is 9 years out from her ablation procedure to treat her atrial fibrillation.  The patient has had rare recurrence of her atrial  arrhythmias most recently atypical atrial flutter in November 2019.  The patient was quite ill at that time with a viral illness including fever, chills, cough and dehydration.  The patient had a self-limited episode when she presented to the ER after an episode of syncope which was thought secondary to dehydration.  She reports no dyspnea on exertion or chest discomfort.  She and her  recently moved out of their [facility] home into an apartment and are quite happy with their new arrangement.  She reports no orthopnea, PND or any ankle edema.  She is recently had her CPAP equipment updated and feels that this is functioning well.    Current Outpatient Medications   Medication Sig Dispense Refill   ? allopurinol (ZYLOPRIM) 100 MG tablet Take 100 mg by mouth 2 (two) times a day.            ? atorvastatin (LIPITOR) 40 MG tablet Take 40 mg by mouth daily. As directed     ? glipiZIDE (GLUCOTROL) 10 MG tablet Take 10 mg by mouth 2 (two) times a day.      ? hydrochlorothiazide (HYDRODIURIL) 25 MG tablet Take 25 mg by mouth daily.     ? potassium chloride (KLOR-CON) 10 MEQ CR tablet Take 20 mEq by mouth 2 (two) times a day.     ? sotalol (BETAPACE) 80 MG tablet Take 1 tablet (80 mg total) by mouth 2 (two) times a day. 180 tablet 3   ? spironolactone-hydrochlorothiazide (ALDACTAZIDE) 25-25 mg tablet 1/2 tab     ? TRULICITY 0.75 mg/0.5 mL PnIj Inject 0.5 mL subcutaneously once every week.     ? valsartan (DIOVAN) 160 MG tablet Take 1 tablet (160 mg total) by mouth daily.  0   ? warfarin (COUMADIN) 2.5 MG tablet Adjust dose based on INR results as directed.     ? empagliflozin (JARDIANCE) 10 mg Tab Take 10 mg by mouth daily.       No current facility-administered medications for this visit.        Review of Systems:   General: WNL  Eyes: WNL  Ears/Nose/Throat: WNL  Lungs: WNL  Heart: WNL  Stomach: WNL  Bladder: WNL  Muscle/Joints: WNL  Skin: WNL  Nervous System: WNL  Mental Health: WNL     Blood: WNL    Family  "History  Family History   Problem Relation Age of Onset   ? Aneurysm Mother         brain   ? Emphysema Father    ? Lung cancer Sister    ? Hypertension Brother    ? Lung cancer Sister    ? Lung cancer Sister    ? Breast cancer Sister        Social History   reports that she quit smoking about 58 years ago. She has never used smokeless tobacco. She reports current alcohol use. She reports that she does not use drugs.    Objective:   Vital signs in last 24 hours:  /70 (Patient Site: Right Arm, Patient Position: Sitting, Cuff Size: Adult Regular)   Pulse (!) 57   Resp 12   Ht 5' 0.43\" (1.535 m)   Wt 139 lb (63 kg)   SpO2 96%   Breastfeeding No   BMI 26.76 kg/m    Weight: Weight: 139 lb (63 kg)     Physical Exam:  General: The patient is alert oriented to person place and situation.  The patient is in no acute distress at the time of my evaluation.  Eyes: Pupils are equal, round, and reactive to light.  Conjunctiva and sclera are clear.  ENT: Oral mucosa is moist and without redness. No evident nasal discharge.  Pulmonary: Lungs are clear bilaterally with no rales, rhonchi, or wheezes.    Cardiovascular exam: Rhythm is regular. S1 and S2 are normal. No significant murmur is present. JVP is normal. Lower extremities demonstrate no significant edema. Distal pulses are intact bilaterally.  Abdomen is flat, soft, and nontender.  Musculoskeletal: Spine is straight. Extremities without deformity.  Neuro: Gait is normal.   Skin is warm, dry, and otherwise intact.      Cardiographics:       Lab Results:   Lab Results   Component Value Date     05/26/2017    K 3.8 05/26/2017     05/26/2017    CO2 23 05/26/2017    BUN 22 05/26/2017    CREATININE 1.09 05/26/2017    CALCIUM 9.7 05/26/2017     Lab Results   Component Value Date    WBC 8.2 07/20/2011    HGB 12.5 07/20/2011    HCT 36.6 07/20/2011    MCV 87 07/20/2011     07/20/2011     Lab Results   Component Value Date    INR 2.60 (!) 11/11/2019 "         Outside record review:

## 2021-07-03 NOTE — ADDENDUM NOTE
Addendum Note by Karina Maguire RN at 11/1/2019  3:32 PM     Author: Karina Maguire RN Service: -- Author Type: Registered Nurse    Filed: 11/1/2019  3:32 PM Encounter Date: 11/1/2019 Status: Signed    : Karina Maguire RN (Registered Nurse)    Addended by: KARINA MAGUIRE on: 11/1/2019 03:32 PM        Modules accepted: Orders

## 2021-07-03 NOTE — ADDENDUM NOTE
Addendum Note by Grisel Adame RN at 6/14/2019 11:49 AM     Author: Grisle Adame RN Service: -- Author Type: Registered Nurse    Filed: 6/14/2019 11:49 AM Encounter Date: 6/10/2019 Status: Signed    : Grisel Adame RN (Registered Nurse)    Addended by: GRISEL ADAME on: 6/14/2019 11:49 AM        Modules accepted: Orders

## 2021-08-30 ENCOUNTER — TELEPHONE (OUTPATIENT)
Dept: CARDIOLOGY | Facility: CLINIC | Age: 81
End: 2021-08-30

## 2021-08-30 NOTE — TELEPHONE ENCOUNTER
Pt is calling for Tang today who is out of clinic  Pt last seem 1 year ago  Pt states she has been in a fib since Sat morning, normal HR runs in the 60's now running around 115  Pt is aware she is out is tired main complaint is fatigue an effort to do anything  Pt is on coumadin INR's done at [facility] and is having one tomorrow  I called the clinic and they will fax them over  Pt is also on sotalol  Reviewed with Linh and pt already has an appt set for Wed 9/1, pt will keep that and reviewed stay hydrated and continue meds as is  Pt agrees to plan and has my direct number if needed

## 2021-08-30 NOTE — TELEPHONE ENCOUNTER
----- Message from Rachel Reyez sent at 8/30/2021  8:06 AM CDT -----  General phone call:  PATIENT IN AFIB FOR THE LAST FEW DAYS  PLEASE CALL  Caller: PATIENT  Primary cardiologist: STEPHANIE BILLY  Detailed reason for call: SEE ABOVE  New or active symptoms? YES   Best phone number: [phone]  Best time to contact: ANY TIME  Ok to leave a detailedmessage? YES  Device? NO    Additional Info:

## 2021-09-01 ENCOUNTER — OFFICE VISIT (OUTPATIENT)
Dept: CARDIOLOGY | Facility: CLINIC | Age: 81
End: 2021-09-01
Payer: COMMERCIAL

## 2021-09-01 ENCOUNTER — DOCUMENTATION ONLY (OUTPATIENT)
Dept: CARDIOLOGY | Facility: CLINIC | Age: 81
End: 2021-09-01

## 2021-09-01 VITALS
DIASTOLIC BLOOD PRESSURE: 74 MMHG | RESPIRATION RATE: 16 BRPM | BODY MASS INDEX: 27.3 KG/M2 | SYSTOLIC BLOOD PRESSURE: 114 MMHG | WEIGHT: 141.8 LBS | HEART RATE: 108 BPM

## 2021-09-01 DIAGNOSIS — G47.33 OSA ON CPAP: ICD-10-CM

## 2021-09-01 DIAGNOSIS — I48.4 ATYPICAL ATRIAL FLUTTER (H): ICD-10-CM

## 2021-09-01 DIAGNOSIS — I48.0 PAROXYSMAL ATRIAL FIBRILLATION (H): Primary | ICD-10-CM

## 2021-09-01 DIAGNOSIS — Z98.890 STATUS POST CATHETER ABLATION OF ATRIAL FIBRILLATION: ICD-10-CM

## 2021-09-01 DIAGNOSIS — I48.19 PERSISTENT ATRIAL FIBRILLATION (H): Primary | ICD-10-CM

## 2021-09-01 LAB
ATRIAL RATE - MUSE: 110 BPM
DIASTOLIC BLOOD PRESSURE - MUSE: NORMAL MMHG
INTERPRETATION ECG - MUSE: NORMAL
P AXIS - MUSE: NORMAL DEGREES
PR INTERVAL - MUSE: 240 MS
QRS DURATION - MUSE: 94 MS
QT - MUSE: 376 MS
QTC - MUSE: 508 MS
R AXIS - MUSE: -16 DEGREES
SYSTOLIC BLOOD PRESSURE - MUSE: NORMAL MMHG
T AXIS - MUSE: 60 DEGREES
VENTRICULAR RATE- MUSE: 110 BPM

## 2021-09-01 PROCEDURE — 99215 OFFICE O/P EST HI 40 MIN: CPT | Performed by: NURSE PRACTITIONER

## 2021-09-01 PROCEDURE — 93000 ELECTROCARDIOGRAM COMPLETE: CPT | Performed by: INTERNAL MEDICINE

## 2021-09-01 RX ORDER — INSULIN GLARGINE AND LIXISENATIDE 100; 33 U/ML; UG/ML
36 INJECTION, SOLUTION SUBCUTANEOUS DAILY
COMMUNITY
Start: 2021-08-17 | End: 2022-08-17

## 2021-09-01 RX ORDER — LIDOCAINE 40 MG/G
CREAM TOPICAL
Status: CANCELLED | OUTPATIENT
Start: 2021-09-01

## 2021-09-01 NOTE — PROGRESS NOTES
"  Thank you, Dr. Corona, for asking the [facility] team to see Ms. Daugherty \"Ellyn\" MARIO Fatima to evaluate PAT versus atypical atrial flutter.    Assessment/Recommendations     Assessment/Plan:    Diagnoses and all orders for this visit:    Persistent atypical atrial flutter-known history of atrial fibrillation and atrial flutter; PVI 3/2011 and repeat PVI 7/2011.  Recurrence of atypical atrial flutter 2013 with cardioversion at that time.  Maintained on sotalol since 2013.  Atrial flutter recurrence 10/29/2019 with cardioversion 11/2019.  Reports 4 days ago her heart rate went from 60s to persistent 110-115.  She has been symptomatic.  Twelve-lead EKG done in clinic today is suspicious for atypical atrial flutter although it is very difficult to see flutter waves.  This is likely due to history of ablations.  Treatment options discussed with patient including cardioversion versus adenosine prior to cardioversion.  -     Continue sotalol 80 mg p.o. twice daily-BMP to be drawn in 2 days with cardioversion  -     Discussed triggers of atrial fibrillation and atrial flutter, discussed rate versus rhythm control.  Due to symptoms it is recommended patient to undergo cardioversion as soon as possible.  -     Patient has been on warfarin therapy for years with stable INRs historically.  8/31 INR 2.2, 7/27 INR 3.1, 7/2/2021 INR 2.8.  Plan is to draw INR prior to cardioversion in 2 days and if within goal range 2.0-3.0 proceed with cardioversion    Status post catheter ablation of atrial fibrillation    SHAVON on CPAP patient reports 100% compliance with her CPAP      [id number] score of 5: 2 age, 1 gender, 1 hypertension, 1 DM and on warfarin.    Follow up in 3 to 4 weeks with HAY patton NP.     History of Present Illness/Subjective     Willow \"Ellyn\" MARIO Fatima is a very pleasant 81 year old female who comes in today for EP evaluation of atypical atrial flutter versus PAT.  Willow \"Ellyn\" MARIO Fatima has a known history " "of paroxysmal atrial fibrillation with PVI 3/2011, repeat PVI 7/2011, recurrence of atypical atrial flutter 2013 with cardioversion, recurrence of atrial flutter 2019 with cardioversion, hypertension, hyperlipidemia, DM 2, SHAVON with CPAP.    Met with patient today to discuss elevated heart rates that started 4 days ago.  She reports her  was injured 3 weeks ago and he is a \"very large man.\"  She has been taking care of him and it is been physically and mentally difficult.  She has not had much sleep and has been exerting herself physically more than usual.  4 days ago she felt her heart rate become elevated, her body felt weak, and she felt \"wiped out.\"  Typically her pulse is in the 60s and it has been 110-115 consistently.  Due to symptoms and elevated heart rate I have recommended cardioversion and she would like to proceed forward.  On twelve-lead EKG it is difficult to diagnose her rhythm but with similar recurrence in 2019 it is likely atypical atrial flutter.  Dr. Post accompanied me into the visit to assess the rhythm without definitive diagnosis.  Patient understands all information and would like to proceed forward with cardioversion.  Risks versus benefits explained    Cardiographics (reviewed):  6/4/2019 Holter       Problem List:  Patient Active Problem List   Diagnosis     Esophageal Reflux     Essential hypertension     Paroxysmal Atrial Fibrillation     Atrial flutter (H)     SHAVON on CPAP     Type 2 Diabetes Mellitus     Dyslipidemia     Status post catheter ablation of atrial fibrillation     Revi  e  Physical Examination Review of Systems   jerome ceron  /74 (BP Location: Left arm, Patient Position: Sitting, Cuff Size: Adult Regular)   Pulse 108   Resp 16   Wt 64.3 kg (141 lb 12.8 oz)   BMI 27.30 kg/m    Body mass index is 27.3 kg/m .  Wt Readings from Last 3 Encounters:   09/01/21 64.3 kg (141 lb 12.8 oz)   07/08/20 63 kg (139 lb)   12/04/19 61.2 kg (135 lb)     General Appearance:  "  Alert, well-appearing and in no acute distress.   HEENT: Atraumatic, normocephalic.  No scleral icterus, normal conjunctivae; mucous membranes pink and moist.     Chest: Chest symmetric, spine straight.   Lungs:   Respirations unlabored: Lungs sounds clear   Cardiovascular:   Normal first and second heart sounds with no murmurs, rubs, or gallops.  Regular.  Radial and posterior tibial pulses are intact.  No edema.       Extremities: No cyanosis or clubbing.   Musculoskeletal: Moves all extremities   Skin: Warm, dry, intact.    Neurologic: Mood and affect are appropriate, alert and oriented to person, place, time, and situation     ROS: 10 point ROS neg other than the symptoms noted above in the HPI.     Medical History  Surgical History Family History Social History     Past Medical History:   Diagnosis Date     Atrial fibrillation (H)     [id number] score of 7-on chronic warfarin Previously failed Sotalol and Multaq therapy PVI 3/11 Repeat PVI 7/11  Recurrent A fib, most recent CV Feb 2013 Rx sotalol      Atrial flutter (H)     Atypical flutter with RVR after second PVI in July 2011       Esophageal reflux     Created by Conversion      Essential hypertension           Status post catheter ablation of atrial fibrillation 6/3/2019    PVI 3/11(PVI/Cryo + CTI line) Repeat PVI 7/11 (PVI/RF + CFE + Roof line + GREGORIO line)    Past Surgical History:   Procedure Laterality Date     HC REMOVE TONSILS/ADENOIDS,<13 Y/O      Description: Tonsillectomy With Adenoidectomy;  Recorded: 10/17/2012;     HC REVISE MEDIAN N/CARPAL TUNNEL SURG      Description: Neuroplasty Decompression Median Nerve At Carpal Tunnel;  Recorded: 10/17/2012;     [place] ABLATE HEART DYSRHYTHM FOCUS      Description: Catheter Ablation Atrial Fibrillation;  Recorded: 06/20/2013;  Comments: PVI 3-7-2011 (PVI/cryo + CTI line); ; Repeat PVI July 2011 (PVI/RF + CFE + Roof line + GREGORIO line)     [place] ABLATE HEART DYSRHYTHM FOCUS      Description: Catheter Ablation  Atrial Fibrillation;  Recorded: 08/05/2014;  Comments: PVI 3-7-2011 (PVI/cryo + CTI line); ; Repeat PVI July 2011 (PVI/RF + CFE + Roof line + GREGORIO line)     [place] CARDIOVERSION ELECTIVE ARRHYTHMIA EXTERNAL       Fib 10/17/12; ; Flutter 2/15/13; AFl 11/11/2019    Family History   Problem Relation Age of Onset     Aneurysm Mother         brain     Emphysema Father      Lung Cancer Sister      Hypertension Brother      Lung Cancer Sister      Lung Cancer Sister      Breast Cancer Sister     History   Smoking Status     Former Smoker     Quit date: 4/13/1962   Smokeless Tobacco     Never Used     Social History    Substance and Sexual Activity      Alcohol use: Yes        Comment: Alcoholic Drinks/day: 6 per month       Medications  Allergies     Current Outpatient Medications   Medication Sig Dispense Refill     allopurinol (ZYLOPRIM) 100 MG tablet [ALLOPURINOL (ZYLOPRIM) 100 MG TABLET] Take 100 mg by mouth 2 (two) times a day.              glipiZIDE (GLUCOTROL) 10 MG tablet [GLIPIZIDE (GLUCOTROL) 10 MG TABLET] Take 10 mg by mouth 2 (two) times a day.        insulin glargine-lixisenatide (SOLIQUA) pen Inject 36 Units Subcutaneous daily       potassium chloride (KLOR-CON) 10 MEQ CR tablet [POTASSIUM CHLORIDE (KLOR-CON) 10 MEQ CR TABLET] Take 20 mEq by mouth 2 (two) times a day.       sotalol (BETAPACE) 80 MG tablet [SOTALOL (BETAPACE) 80 MG TABLET] Take 1 tablet (80 mg total) by mouth 2 (two) times a day. 180 tablet 3     spironolactone-hydrochlorothiazide (ALDACTAZIDE) 25-25 mg tablet [SPIRONOLACTONE-HYDROCHLOROTHIAZIDE (ALDACTAZIDE) 25-25 MG TABLET] 1/2 tab       warfarin (COUMADIN) 2.5 MG tablet [WARFARIN (COUMADIN) 2.5 MG TABLET] Adjust dose based on INR results as directed.        Allergies   Allergen Reactions     Ace Inhibitors Cough     Empagliflozin Other (See Comments)     weakness     Metformin GI Disturbance     2013.  Retry 2016- gastrointestinal intolerance again      Medical, surgical, family, social history,  and medications were all reviewed and updated as necessary.   Lab Results    Chemistry/lipid CBC Cardiac Enzymes/BNP/TSH/INR     [unfilled]  No results found for: BNP No results found for: WBC, HGB, HCT, MCV, PLT     No results found for: CHOL, HDL, TRIG       Total Time- 60 minutes spent on date of encounter doing chart review, history and exam, documentation and further activities as noted above.  This note has been dictated using voice recognition software. Any grammatical, typographical, or context distortions are unintentional and inherent to the software.    Linh Solorzano, [facility]

## 2021-09-01 NOTE — H&P (VIEW-ONLY)
"  Thank you, Dr. Corona, for asking the [facility] team to see Ms. Daugherty \"Ellyn\" MARIO Fatima to evaluate PAT versus atypical atrial flutter.    Assessment/Recommendations     Assessment/Plan:    Diagnoses and all orders for this visit:    Persistent atypical atrial flutter-known history of atrial fibrillation and atrial flutter; PVI 3/2011 and repeat PVI 7/2011.  Recurrence of atypical atrial flutter 2013 with cardioversion at that time.  Maintained on sotalol since 2013.  Atrial flutter recurrence 10/29/2019 with cardioversion 11/2019.  Reports 4 days ago her heart rate went from 60s to persistent 110-115.  She has been symptomatic.  Twelve-lead EKG done in clinic today is suspicious for atypical atrial flutter although it is very difficult to see flutter waves.  This is likely due to history of ablations.  Treatment options discussed with patient including cardioversion versus adenosine prior to cardioversion.  -     Continue sotalol 80 mg p.o. twice daily-BMP to be drawn in 2 days with cardioversion  -     Discussed triggers of atrial fibrillation and atrial flutter, discussed rate versus rhythm control.  Due to symptoms it is recommended patient to undergo cardioversion as soon as possible.  -     Patient has been on warfarin therapy for years with stable INRs historically.  8/31 INR 2.2, 7/27 INR 3.1, 7/2/2021 INR 2.8.  Plan is to draw INR prior to cardioversion in 2 days and if within goal range 2.0-3.0 proceed with cardioversion    Status post catheter ablation of atrial fibrillation    SHAVON on CPAP patient reports 100% compliance with her CPAP      [id number] score of 5: 2 age, 1 gender, 1 hypertension, 1 DM and on warfarin.    Follow up in 3 to 4 weeks with HAY patton NP.     History of Present Illness/Subjective     Willow \"Ellyn\" MARIO Fatima is a very pleasant 81 year old female who comes in today for EP evaluation of atypical atrial flutter versus PAT.  Willow \"Ellyn\" MARIO Fatima has a known history " "of paroxysmal atrial fibrillation with PVI 3/2011, repeat PVI 7/2011, recurrence of atypical atrial flutter 2013 with cardioversion, recurrence of atrial flutter 2019 with cardioversion, hypertension, hyperlipidemia, DM 2, SHAVON with CPAP.    Met with patient today to discuss elevated heart rates that started 4 days ago.  She reports her  was injured 3 weeks ago and he is a \"very large man.\"  She has been taking care of him and it is been physically and mentally difficult.  She has not had much sleep and has been exerting herself physically more than usual.  4 days ago she felt her heart rate become elevated, her body felt weak, and she felt \"wiped out.\"  Typically her pulse is in the 60s and it has been 110-115 consistently.  Due to symptoms and elevated heart rate I have recommended cardioversion and she would like to proceed forward.  On twelve-lead EKG it is difficult to diagnose her rhythm but with similar recurrence in 2019 it is likely atypical atrial flutter.  Dr. Post accompanied me into the visit to assess the rhythm without definitive diagnosis.  Patient understands all information and would like to proceed forward with cardioversion.  Risks versus benefits explained    Cardiographics (reviewed):  6/4/2019 Holter       Problem List:  Patient Active Problem List   Diagnosis     Esophageal Reflux     Essential hypertension     Paroxysmal Atrial Fibrillation     Atrial flutter (H)     SHAVON on CPAP     Type 2 Diabetes Mellitus     Dyslipidemia     Status post catheter ablation of atrial fibrillation     Revi  e  Physical Examination Review of Systems   jerome ceron  /74 (BP Location: Left arm, Patient Position: Sitting, Cuff Size: Adult Regular)   Pulse 108   Resp 16   Wt 64.3 kg (141 lb 12.8 oz)   BMI 27.30 kg/m    Body mass index is 27.3 kg/m .  Wt Readings from Last 3 Encounters:   09/01/21 64.3 kg (141 lb 12.8 oz)   07/08/20 63 kg (139 lb)   12/04/19 61.2 kg (135 lb)     General Appearance:  "  Alert, well-appearing and in no acute distress.   HEENT: Atraumatic, normocephalic.  No scleral icterus, normal conjunctivae; mucous membranes pink and moist.     Chest: Chest symmetric, spine straight.   Lungs:   Respirations unlabored: Lungs sounds clear   Cardiovascular:   Normal first and second heart sounds with no murmurs, rubs, or gallops.  Regular.  Radial and posterior tibial pulses are intact.  No edema.       Extremities: No cyanosis or clubbing.   Musculoskeletal: Moves all extremities   Skin: Warm, dry, intact.    Neurologic: Mood and affect are appropriate, alert and oriented to person, place, time, and situation     ROS: 10 point ROS neg other than the symptoms noted above in the HPI.     Medical History  Surgical History Family History Social History     Past Medical History:   Diagnosis Date     Atrial fibrillation (H)     [id number] score of 7-on chronic warfarin Previously failed Sotalol and Multaq therapy PVI 3/11 Repeat PVI 7/11  Recurrent A fib, most recent CV Feb 2013 Rx sotalol      Atrial flutter (H)     Atypical flutter with RVR after second PVI in July 2011       Esophageal reflux     Created by Conversion      Essential hypertension           Status post catheter ablation of atrial fibrillation 6/3/2019    PVI 3/11(PVI/Cryo + CTI line) Repeat PVI 7/11 (PVI/RF + CFE + Roof line + GREGORIO line)    Past Surgical History:   Procedure Laterality Date     HC REMOVE TONSILS/ADENOIDS,<13 Y/O      Description: Tonsillectomy With Adenoidectomy;  Recorded: 10/17/2012;     HC REVISE MEDIAN N/CARPAL TUNNEL SURG      Description: Neuroplasty Decompression Median Nerve At Carpal Tunnel;  Recorded: 10/17/2012;     [place] ABLATE HEART DYSRHYTHM FOCUS      Description: Catheter Ablation Atrial Fibrillation;  Recorded: 06/20/2013;  Comments: PVI 3-7-2011 (PVI/cryo + CTI line); ; Repeat PVI July 2011 (PVI/RF + CFE + Roof line + GREGORIO line)     [place] ABLATE HEART DYSRHYTHM FOCUS      Description: Catheter Ablation  Atrial Fibrillation;  Recorded: 08/05/2014;  Comments: PVI 3-7-2011 (PVI/cryo + CTI line); ; Repeat PVI July 2011 (PVI/RF + CFE + Roof line + GREGORIO line)     [place] CARDIOVERSION ELECTIVE ARRHYTHMIA EXTERNAL       Fib 10/17/12; ; Flutter 2/15/13; AFl 11/11/2019    Family History   Problem Relation Age of Onset     Aneurysm Mother         brain     Emphysema Father      Lung Cancer Sister      Hypertension Brother      Lung Cancer Sister      Lung Cancer Sister      Breast Cancer Sister     History   Smoking Status     Former Smoker     Quit date: 4/13/1962   Smokeless Tobacco     Never Used     Social History    Substance and Sexual Activity      Alcohol use: Yes        Comment: Alcoholic Drinks/day: 6 per month       Medications  Allergies     Current Outpatient Medications   Medication Sig Dispense Refill     allopurinol (ZYLOPRIM) 100 MG tablet [ALLOPURINOL (ZYLOPRIM) 100 MG TABLET] Take 100 mg by mouth 2 (two) times a day.              glipiZIDE (GLUCOTROL) 10 MG tablet [GLIPIZIDE (GLUCOTROL) 10 MG TABLET] Take 10 mg by mouth 2 (two) times a day.        insulin glargine-lixisenatide (SOLIQUA) pen Inject 36 Units Subcutaneous daily       potassium chloride (KLOR-CON) 10 MEQ CR tablet [POTASSIUM CHLORIDE (KLOR-CON) 10 MEQ CR TABLET] Take 20 mEq by mouth 2 (two) times a day.       sotalol (BETAPACE) 80 MG tablet [SOTALOL (BETAPACE) 80 MG TABLET] Take 1 tablet (80 mg total) by mouth 2 (two) times a day. 180 tablet 3     spironolactone-hydrochlorothiazide (ALDACTAZIDE) 25-25 mg tablet [SPIRONOLACTONE-HYDROCHLOROTHIAZIDE (ALDACTAZIDE) 25-25 MG TABLET] 1/2 tab       warfarin (COUMADIN) 2.5 MG tablet [WARFARIN (COUMADIN) 2.5 MG TABLET] Adjust dose based on INR results as directed.        Allergies   Allergen Reactions     Ace Inhibitors Cough     Empagliflozin Other (See Comments)     weakness     Metformin GI Disturbance     2013.  Retry 2016- gastrointestinal intolerance again      Medical, surgical, family, social history,  and medications were all reviewed and updated as necessary.   Lab Results    Chemistry/lipid CBC Cardiac Enzymes/BNP/TSH/INR     [unfilled]  No results found for: BNP No results found for: WBC, HGB, HCT, MCV, PLT     No results found for: CHOL, HDL, TRIG       Total Time- 60 minutes spent on date of encounter doing chart review, history and exam, documentation and further activities as noted above.  This note has been dictated using voice recognition software. Any grammatical, typographical, or context distortions are unintentional and inherent to the software.    Linh Solorzano, [facility]

## 2021-09-01 NOTE — PATIENT INSTRUCTIONS
Willow Fatima,    It was a pleasure to see you today at the [facility].     My recommendations after this visit include:    Cardioversion this Friday- we will call with the details.    INR and kidney labs will be checked at your cardioversion.    *Instructions for the day of cardioversion:  #1 Nothing to eat or drink for 8 hours before your procedure.  #2 Okay to take all prescription meds in the morning with water.  If you take your blood thinner in the morning, please take it.  #3 Please hold vitamins, supplements the day of cardioversion.  #4 You will need a  to drop you off and pick you up after procedure.  It will take 3-4 hrs.  #5 This is an outpatient procedure and done at [facility].   #6  Bathe or shower before coming in.  #7 Leave jewelry at home.    HOLD THE GLIPIZIDE AND THE SOLIQUAS UNTIL AFTER THE CARDIOVERSION    Cardioversion will be ordered today and you will get a call from the  when you are ready for cardioversion.      To followup with me in  3-4 weeks after cardioversion.      My contact information:  Linh Solorzano CNP  After Hours or Scheduling  [phone]  Cardioversion Nurse---Luna Valencia [phone]      My contact information:  Linh Solorzano CNP  After Hours or Scheduling  [phone]  My Nurses phone number [phone]- normal business hours      
Alert-The patient is alert, awake and responds to voice. The patient is oriented to time, place, and person. The triage nurse is able to obtain subjective information.

## 2021-09-01 NOTE — LETTER
"9/1/2021    DESHAUN YBARRA  [facility] [address]  [facility] [address]    RE: Willow \"Ellyn\" MARIO Fatima       Dear Colleague,    I had the pleasure of seeing Willow \"Ellyn\" MARIO Fatima in the [facility].      Thank you, Dr. Corona, for asking the [facility] team to see Ms. Daugherty \"Ellyn\" MARIO Fatima to evaluate PAT versus atypical atrial flutter.    Assessment/Recommendations     Assessment/Plan:    Diagnoses and all orders for this visit:    Persistent atypical atrial flutter-known history of atrial fibrillation and atrial flutter; PVI 3/2011 and repeat PVI 7/2011.  Recurrence of atypical atrial flutter 2013 with cardioversion at that time.  Maintained on sotalol since 2013.  Atrial flutter recurrence 10/29/2019 with cardioversion 11/2019.  Reports 4 days ago her heart rate went from 60s to persistent 110-115.  She has been symptomatic.  Twelve-lead EKG done in clinic today is suspicious for atypical atrial flutter although it is very difficult to see flutter waves.  This is likely due to history of ablations.  Treatment options discussed with patient including cardioversion versus adenosine prior to cardioversion.  -     Continue sotalol 80 mg p.o. twice daily-BMP to be drawn in 2 days with cardioversion  -     Discussed triggers of atrial fibrillation and atrial flutter, discussed rate versus rhythm control.  Due to symptoms it is recommended patient to undergo cardioversion as soon as possible.  -     Patient has been on warfarin therapy for years with stable INRs historically.  8/31 INR 2.2, 7/27 INR 3.1, 7/2/2021 INR 2.8.  Plan is to draw INR prior to cardioversion in 2 days and if within goal range 2.0-3.0 proceed with cardioversion    Status post catheter ablation of atrial fibrillation    SHAVON on CPAP patient reports 100% compliance with her CPAP      [id number] score of 5: 2 age, 1 gender, 1 hypertension, 1 DM and on " "warfarin.    Follow up in 3 to 4 weeks with HAY patton NP.     History of Present Illness/Subjective     Willow \"Ellny\" MARIO Fatima is a very pleasant 81 year old female who comes in today for EP evaluation of atypical atrial flutter versus PAT.  Willow \"Ellyn\" MARIO Fatima has a known history of paroxysmal atrial fibrillation with PVI 3/2011, repeat PVI 7/2011, recurrence of atypical atrial flutter 2013 with cardioversion, recurrence of atrial flutter 2019 with cardioversion, hypertension, hyperlipidemia, DM 2, SHAVON with CPAP.    Met with patient today to discuss elevated heart rates that started 4 days ago.  She reports her  was injured 3 weeks ago and he is a \"very large man.\"  She has been taking care of him and it is been physically and mentally difficult.  She has not had much sleep and has been exerting herself physically more than usual.  4 days ago she felt her heart rate become elevated, her body felt weak, and she felt \"wiped out.\"  Typically her pulse is in the 60s and it has been 110-115 consistently.  Due to symptoms and elevated heart rate I have recommended cardioversion and she would like to proceed forward.  On twelve-lead EKG it is difficult to diagnose her rhythm but with similar recurrence in 2019 it is likely atypical atrial flutter.  Dr. Post accompanied me into the visit to assess the rhythm without definitive diagnosis.  Patient understands all information and would like to proceed forward with cardioversion.  Risks versus benefits explained    Cardiographics (reviewed):  6/4/2019 Holter       Problem List:  Patient Active Problem List   Diagnosis     Esophageal Reflux     Essential hypertension     Paroxysmal Atrial Fibrillation     Atrial flutter (H)     SHAVON on CPAP     Type 2 Diabetes Mellitus     Dyslipidemia     Status post catheter ablation of atrial fibrillation     Revi  e  Physical Examination Review of Systems   w [facility]  /74 (BP Location: Left arm, Patient Position: Sitting, " Cuff Size: Adult Regular)   Pulse 108   Resp 16   Wt 64.3 kg (141 lb 12.8 oz)   BMI 27.30 kg/m    Body mass index is 27.3 kg/m .  Wt Readings from Last 3 Encounters:   09/01/21 64.3 kg (141 lb 12.8 oz)   07/08/20 63 kg (139 lb)   12/04/19 61.2 kg (135 lb)     General Appearance:   Alert, well-appearing and in no acute distress.   HEENT: Atraumatic, normocephalic.  No scleral icterus, normal conjunctivae; mucous membranes pink and moist.     Chest: Chest symmetric, spine straight.   Lungs:   Respirations unlabored: Lungs sounds clear   Cardiovascular:   Normal first and second heart sounds with no murmurs, rubs, or gallops.  Regular.  Radial and posterior tibial pulses are intact.  No edema.       Extremities: No cyanosis or clubbing.   Musculoskeletal: Moves all extremities   Skin: Warm, dry, intact.    Neurologic: Mood and affect are appropriate, alert and oriented to person, place, time, and situation     ROS: 10 point ROS neg other than the symptoms noted above in the HPI.     Medical History  Surgical History Family History Social History     Past Medical History:   Diagnosis Date     Atrial fibrillation (H)     [id number] score of 7-on chronic warfarin Previously failed Sotalol and Multaq therapy PVI 3/11 Repeat PVI 7/11  Recurrent A fib, most recent CV Feb 2013 Rx sotalol      Atrial flutter (H)     Atypical flutter with RVR after second PVI in July 2011       Esophageal reflux     Created by Conversion      Essential hypertension           Status post catheter ablation of atrial fibrillation 6/3/2019    PVI 3/11(PVI/Cryo + CTI line) Repeat PVI 7/11 (PVI/RF + CFE + Roof line + GREGORIO line)    Past Surgical History:   Procedure Laterality Date     HC REMOVE TONSILS/ADENOIDS,<11 Y/O      Description: Tonsillectomy With Adenoidectomy;  Recorded: 10/17/2012;     HC REVISE MEDIAN N/CARPAL TUNNEL SURG      Description: Neuroplasty Decompression Median Nerve At Carpal Tunnel;  Recorded: 10/17/2012;     SC ABLATE  HEART DYSRHYTHM FOCUS      Description: Catheter Ablation Atrial Fibrillation;  Recorded: 06/20/2013;  Comments: PVI 3-7-2011 (PVI/cryo + CTI line); ; Repeat PVI July 2011 (PVI/RF + CFE + Roof line + GREGORIO line)     TX ABLATE HEART DYSRHYTHM FOCUS      Description: Catheter Ablation Atrial Fibrillation;  Recorded: 08/05/2014;  Comments: PVI 3-7-2011 (PVI/cryo + CTI line); ; Repeat PVI July 2011 (PVI/RF + CFE + Roof line + GREGORIO line)     TX CARDIOVERSION ELECTIVE ARRHYTHMIA EXTERNAL       Fib 10/17/12; ; Flutter 2/15/13; AFl 11/11/2019    Family History   Problem Relation Age of Onset     Aneurysm Mother         brain     Emphysema Father      Lung Cancer Sister      Hypertension Brother      Lung Cancer Sister      Lung Cancer Sister      Breast Cancer Sister     History   Smoking Status     Former Smoker     Quit date: 4/13/1962   Smokeless Tobacco     Never Used     Social History    Substance and Sexual Activity      Alcohol use: Yes        Comment: Alcoholic Drinks/day: 6 per month       Medications  Allergies     Current Outpatient Medications   Medication Sig Dispense Refill     allopurinol (ZYLOPRIM) 100 MG tablet [ALLOPURINOL (ZYLOPRIM) 100 MG TABLET] Take 100 mg by mouth 2 (two) times a day.              glipiZIDE (GLUCOTROL) 10 MG tablet [GLIPIZIDE (GLUCOTROL) 10 MG TABLET] Take 10 mg by mouth 2 (two) times a day.        insulin glargine-lixisenatide (SOLIQUA) pen Inject 36 Units Subcutaneous daily       potassium chloride (KLOR-CON) 10 MEQ CR tablet [POTASSIUM CHLORIDE (KLOR-CON) 10 MEQ CR TABLET] Take 20 mEq by mouth 2 (two) times a day.       sotalol (BETAPACE) 80 MG tablet [SOTALOL (BETAPACE) 80 MG TABLET] Take 1 tablet (80 mg total) by mouth 2 (two) times a day. 180 tablet 3     spironolactone-hydrochlorothiazide (ALDACTAZIDE) 25-25 mg tablet [SPIRONOLACTONE-HYDROCHLOROTHIAZIDE (ALDACTAZIDE) 25-25 MG TABLET] 1/2 tab       warfarin (COUMADIN) 2.5 MG tablet [WARFARIN (COUMADIN) 2.5 MG TABLET] Adjust dose  based on INR results as directed.        Allergies   Allergen Reactions     Ace Inhibitors Cough     Empagliflozin Other (See Comments)     weakness     Metformin GI Disturbance     2013.  Retry 2016- gastrointestinal intolerance again      Medical, surgical, family, social history, and medications were all reviewed and updated as necessary.   Lab Results    Chemistry/lipid CBC Cardiac Enzymes/BNP/TSH/INR     [unfilled]  No results found for: BNP No results found for: WBC, HGB, HCT, MCV, PLT     No results found for: CHOL, HDL, TRIG       Total Time- 60 minutes spent on date of encounter doing chart review, history and exam, documentation and further activities as noted above.  This note has been dictated using voice recognition software. Any grammatical, typographical, or context distortions are unintentional and inherent to the software.    LONNY Francis [facility]        Thank you for allowing me to participate in the care of your patient.      Sincerely,     TEODORO Francis CNP [facility]  cc:   No referring provider defined for this encounter.

## 2021-09-01 NOTE — PROGRESS NOTES
H&P LS 9/1 Teach  I Order X P Order X Letter    COVID  Anticoag Warfarin Meds  AM ok     Pt reports taking anticoagulation appropriately, denies any missed doses in anticoagulation and See attached/listed INR values   Adenosine will need to be available at time of CV    INRs  8/31- 2.2  7/27- 3.1  7/2- 2.8  6/8- 3.2  6/3- 3.2    1940  Home:[phone] (home) Cell:[phone] (mobile)  Emergency Contact: Jesus Fatima [phone]    +++Important patient information for CSC/Cath Lab staff : None+++    [facility] EP Cath Lab Procedure Order   Cardioversion:  Cardioversion    Diagnosis:  AF  Anticipated Case Duration:  Standard  Scheduling Needs/Timeframe:  COVID Scheduling- urgent next available  Scheduling Contact: Please contact pt to schedule date/time for COVID testing and CV, if you are unable to schedule date within the next 24 hours please contact pt to update on scheduling process    Current Device: None None  Device Company/Device Rep Needed for Procedure: None    Pre-Procedural Testing needed: COVID 19 nasal/lab test within 48hrs of procedure  Anesthesia:  General-CV Only  Research Protocol:  No    [facility] EP Cath Lab Prep   Ordering Provider: Linh Solorzano NP  Ordering Date: 9/1/2021  Orders Status: Intial order placed and Order set placed    H&P:  Compled by Linh on 8/31 if scheduled within 30 days, pt to schedule with PMD if procedure outside of this timeframe  PCP: Juan Mcmanus, [phone]    Pre-op Labs: Ordered AM of procedure    Medical Records Pertinent for Procedure:  N/A    Patient Education:    Teach with Patient: Will be completed via phone prior to procedure.    Risks Reviewed:     Cardioversion    >90% acute success rate, <10% failure to convert or   reverts shortly after cardioversion.    <1% embolic event of (CVA, pulmonary embolism, or   1. other site).    75% risk for superficial burn.  Risks associated with general anesthesia will be addressed by the Anesthesiology  Department    Pre-Procedure Instructions that were Reviewed with Patient:  NPO after midnight, Remove all jewelry prior to coming in for procedure, Shower prior to arrival, Notified patient of time and date of procedure by CV , Transportation arrangements needed s/p procedure, Post-procedure follow up process, Sedation plan/orders and Pre-procedure letter was sent to pt by CV     Pre-Procedure Medication Instructions:  Instructions given to pt regarding anticoagulants: Coumadin- instructed to continue anticoagulation uninterrupted through their procedure  Instructions given to pt regarding antiarrhythmic medication: Sotalol; Pt instructed to continue medication prior to procedure  Instructions for medication, other than anticoagulants/antiarrhythmics listed above, given to pt: to take all morning medications with small sips of water, with the exception of OTC supplements and MVI    Allergies   Allergen Reactions     Ace Inhibitors Cough     Empagliflozin Other (See Comments)     weakness     Metformin GI Disturbance     2013.  Retry 2016- gastrointestinal intolerance again       Current Outpatient Medications:      allopurinol (ZYLOPRIM) 100 MG tablet, [ALLOPURINOL (ZYLOPRIM) 100 MG TABLET] Take 100 mg by mouth 2 (two) times a day.       , Disp: , Rfl:      glipiZIDE (GLUCOTROL) 10 MG tablet, [GLIPIZIDE (GLUCOTROL) 10 MG TABLET] Take 10 mg by mouth 2 (two) times a day. , Disp: , Rfl:      insulin glargine-lixisenatide (SOLIQUA) pen, Inject 36 Units Subcutaneous daily, Disp: , Rfl:      potassium chloride (KLOR-CON) 10 MEQ CR tablet, [POTASSIUM CHLORIDE (KLOR-CON) 10 MEQ CR TABLET] Take 20 mEq by mouth 2 (two) times a day., Disp: , Rfl:      sotalol (BETAPACE) 80 MG tablet, [SOTALOL (BETAPACE) 80 MG TABLET] Take 1 tablet (80 mg total) by mouth 2 (two) times a day., Disp: 180 tablet, Rfl: 3     spironolactone-hydrochlorothiazide (ALDACTAZIDE) 25-25 mg tablet, [SPIRONOLACTONE-HYDROCHLOROTHIAZIDE  (ALDACTAZIDE) 25-25 MG TABLET] 1/2 tab, Disp: , Rfl:      warfarin (COUMADIN) 2.5 MG tablet, [WARFARIN (COUMADIN) 2.5 MG TABLET] Adjust dose based on INR results as directed., Disp: , Rfl:     Documentation Date:9/1/2021 2:45 PM  Karina Maguire RN

## 2021-09-02 ENCOUNTER — LAB (OUTPATIENT)
Dept: LAB | Facility: CLINIC | Age: 81
End: 2021-09-02
Payer: COMMERCIAL

## 2021-09-02 ENCOUNTER — TELEPHONE (OUTPATIENT)
Dept: CARDIOLOGY | Facility: CLINIC | Age: 81
End: 2021-09-02

## 2021-09-02 DIAGNOSIS — I48.0 PAROXYSMAL ATRIAL FIBRILLATION (H): ICD-10-CM

## 2021-09-02 LAB — SARS-COV-2 RNA RESP QL NAA+PROBE: NEGATIVE

## 2021-09-02 PROCEDURE — U0003 INFECTIOUS AGENT DETECTION BY NUCLEIC ACID (DNA OR RNA); SEVERE ACUTE RESPIRATORY SYNDROME CORONAVIRUS 2 (SARS-COV-2) (CORONAVIRUS DISEASE [COVID-19]), AMPLIFIED PROBE TECHNIQUE, MAKING USE OF HIGH THROUGHPUT TECHNOLOGIES AS DESCRIBED BY CMS-2020-01-R: HCPCS

## 2021-09-02 PROCEDURE — U0005 INFEC AGEN DETEC AMPLI PROBE: HCPCS

## 2021-09-02 NOTE — TELEPHONE ENCOUNTER
Pre Procedure PCPre-Procedure Education Phone Call    Procedure: CV with Linh Solorzano NP  Education:Reviewed Pre-Intra-Post education and instructions reviewed via phone  COIVD: scheduled on 9/2 at a  facility, results will be viewable in EPIC  Pre-Op: completed and in [facility]  9/2/2021 9:13 AM  AGUS Napier, Karina Espino RN  Caller: Unspecified (Yesterday,  2:45 PM)  CV WITH LINH     9/3/21 830 AM ADMIT     NO DEVICE     H&P: 9/1 LINH     COVID: 9/2 [facility]     FOLLOW UP: LINH 9/27     Thanks!   Casandra

## 2021-09-03 ENCOUNTER — ANESTHESIA EVENT (OUTPATIENT)
Dept: CARDIOLOGY | Facility: HOSPITAL | Age: 81
End: 2021-09-03
Payer: COMMERCIAL

## 2021-09-03 ENCOUNTER — HOSPITAL ENCOUNTER (OUTPATIENT)
Dept: CARDIOLOGY | Facility: HOSPITAL | Age: 81
Discharge: HOME | End: 2021-09-03
Attending: NURSE PRACTITIONER | Admitting: NURSE PRACTITIONER
Payer: COMMERCIAL

## 2021-09-03 ENCOUNTER — ANESTHESIA (OUTPATIENT)
Dept: CARDIOLOGY | Facility: HOSPITAL | Age: 81
End: 2021-09-03
Payer: COMMERCIAL

## 2021-09-03 VITALS
RESPIRATION RATE: 19 BRPM | SYSTOLIC BLOOD PRESSURE: 136 MMHG | OXYGEN SATURATION: 94 % | HEIGHT: 61 IN | WEIGHT: 139.3 LBS | HEART RATE: 59 BPM | DIASTOLIC BLOOD PRESSURE: 65 MMHG | BODY MASS INDEX: 26.3 KG/M2 | TEMPERATURE: 97.9 F

## 2021-09-03 DIAGNOSIS — I48.19 PERSISTENT ATRIAL FIBRILLATION (H): ICD-10-CM

## 2021-09-03 LAB
ANION GAP SERPL CALCULATED.3IONS-SCNC: 12 MMOL/L (ref 5–18)
ATRIAL RATE - MUSE: 61 BPM
BUN SERPL-MCNC: 20 MG/DL (ref 8–28)
CALCIUM SERPL-MCNC: 9.9 MG/DL (ref 8.5–10.5)
CHLORIDE BLD-SCNC: 105 MMOL/L (ref 98–107)
CO2 SERPL-SCNC: 19 MMOL/L (ref 22–31)
CREAT SERPL-MCNC: 1 MG/DL (ref 0.6–1.1)
DIASTOLIC BLOOD PRESSURE - MUSE: NORMAL MMHG
GFR SERPL CREATININE-BSD FRML MDRD: 53 ML/MIN/1.73M2
GLUCOSE BLD-MCNC: 247 MG/DL (ref 70–125)
INTERPRETATION ECG - MUSE: NORMAL
P AXIS - MUSE: 31 DEGREES
POTASSIUM BLD-SCNC: 4.6 MMOL/L (ref 3.5–5)
PR INTERVAL - MUSE: 226 MS
QRS DURATION - MUSE: 88 MS
QT - MUSE: 472 MS
QTC - MUSE: 475 MS
R AXIS - MUSE: 13 DEGREES
SODIUM SERPL-SCNC: 136 MMOL/L (ref 136–145)
SYSTOLIC BLOOD PRESSURE - MUSE: NORMAL MMHG
T AXIS - MUSE: 5 DEGREES
VENTRICULAR RATE- MUSE: 61 BPM

## 2021-09-03 PROCEDURE — 93005 ELECTROCARDIOGRAM TRACING: CPT

## 2021-09-03 PROCEDURE — 250N000009 HC RX 250: Performed by: NURSE ANESTHETIST, CERTIFIED REGISTERED

## 2021-09-03 PROCEDURE — 80048 BASIC METABOLIC PNL TOTAL CA: CPT | Performed by: NURSE PRACTITIONER

## 2021-09-03 PROCEDURE — 92960 CARDIOVERSION ELECTRIC EXT: CPT

## 2021-09-03 PROCEDURE — 36415 COLL VENOUS BLD VENIPUNCTURE: CPT | Performed by: NURSE PRACTITIONER

## 2021-09-03 PROCEDURE — 92960 CARDIOVERSION ELECTRIC EXT: CPT | Performed by: NURSE PRACTITIONER

## 2021-09-03 PROCEDURE — 370N000017 HC ANESTHESIA TECHNICAL FEE, PER MIN

## 2021-09-03 PROCEDURE — 999N000054 HC STATISTIC EKG NON-CHARGEABLE

## 2021-09-03 PROCEDURE — 93010 ELECTROCARDIOGRAM REPORT: CPT | Performed by: INTERNAL MEDICINE

## 2021-09-03 PROCEDURE — 258N000003 HC RX IP 258 OP 636: Performed by: NURSE ANESTHETIST, CERTIFIED REGISTERED

## 2021-09-03 RX ORDER — SODIUM CHLORIDE, SODIUM LACTATE, POTASSIUM CHLORIDE, CALCIUM CHLORIDE 600; 310; 30; 20 MG/100ML; MG/100ML; MG/100ML; MG/100ML
INJECTION, SOLUTION INTRAVENOUS CONTINUOUS
Status: DISCONTINUED | OUTPATIENT
Start: 2021-09-03 | End: 2021-09-03 | Stop reason: HOSPADM

## 2021-09-03 RX ORDER — ONDANSETRON 4 MG/1
4 TABLET, ORALLY DISINTEGRATING ORAL EVERY 30 MIN PRN
Status: DISCONTINUED | OUTPATIENT
Start: 2021-09-03 | End: 2021-09-03 | Stop reason: HOSPADM

## 2021-09-03 RX ORDER — LIDOCAINE 40 MG/G
CREAM TOPICAL
Status: DISCONTINUED | OUTPATIENT
Start: 2021-09-03 | End: 2021-09-03 | Stop reason: HOSPADM

## 2021-09-03 RX ORDER — SODIUM CHLORIDE 9 MG/ML
INJECTION, SOLUTION INTRAVENOUS CONTINUOUS PRN
Status: DISCONTINUED | OUTPATIENT
Start: 2021-09-03 | End: 2021-09-03

## 2021-09-03 RX ORDER — ONDANSETRON 2 MG/ML
4 INJECTION INTRAMUSCULAR; INTRAVENOUS EVERY 30 MIN PRN
Status: DISCONTINUED | OUTPATIENT
Start: 2021-09-03 | End: 2021-09-03 | Stop reason: HOSPADM

## 2021-09-03 RX ORDER — OXYCODONE HYDROCHLORIDE 5 MG/1
5 TABLET ORAL EVERY 4 HOURS PRN
Status: DISCONTINUED | OUTPATIENT
Start: 2021-09-03 | End: 2021-09-03 | Stop reason: HOSPADM

## 2021-09-03 RX ORDER — MEPERIDINE HYDROCHLORIDE 25 MG/ML
12.5 INJECTION INTRAMUSCULAR; INTRAVENOUS; SUBCUTANEOUS
Status: DISCONTINUED | OUTPATIENT
Start: 2021-09-03 | End: 2021-09-03 | Stop reason: HOSPADM

## 2021-09-03 RX ADMIN — SODIUM CHLORIDE: 9 INJECTION, SOLUTION INTRAVENOUS at 10:32

## 2021-09-03 RX ADMIN — METHOHEXITAL SODIUM 50 MG: 500 INJECTION, POWDER, LYOPHILIZED, FOR SOLUTION INTRAMUSCULAR; INTRAVENOUS; RECTAL at 10:36

## 2021-09-03 ASSESSMENT — ENCOUNTER SYMPTOMS: DYSRHYTHMIAS: 1

## 2021-09-03 ASSESSMENT — MIFFLIN-ST. JEOR: SCORE: 1034.24

## 2021-09-03 NOTE — ANESTHESIA POSTPROCEDURE EVALUATION
Patient: Willow Fatima    * No procedures listed *    Diagnosis:* No pre-op diagnosis entered *  Diagnosis Additional Information: No value filed.    Anesthesia Type:  General    Note:  Disposition: Outpatient   Postop Pain Control: Uneventful            Sign Out: Well controlled pain   PONV: No   Neuro/Psych: Uneventful            Sign Out: Acceptable/Baseline neuro status   Airway/Respiratory: Uneventful            Sign Out: Acceptable/Baseline resp. status   CV/Hemodynamics: Uneventful            Sign Out: Acceptable CV status; No obvious hypovolemia; No obvious fluid overload   Other NRE: NONE   DID A NON-ROUTINE EVENT OCCUR? No           Last vitals:  Vitals Value Taken Time   /65 09/03/21 1115   Temp     Pulse 60 09/03/21 1124   Resp 22 09/03/21 1124   SpO2     Vitals shown include unvalidated device data.    Electronically Signed By: Mendel Mosley MD  September 3, 2021  11:25 AM

## 2021-09-03 NOTE — PROCEDURES
"[facility]    Procedure: Cardioversion External    Date/Time: 9/3/2021 11:49 AM  Performed by: Linh Solorzano APRN CNP  Authorized by: Linh Solorzano APRN CNP     UNIVERSAL PROTOCOL   Site Marked: NA  Prior Images Obtained and Reviewed:  Yes  Required items: Required blood products, implants, devices and special equipment available    Patient identity confirmed:  Verbally with patient, arm band and provided demographic data  Patient was reevaluated immediately before administering moderate or deep sedation or anesthesia  Confirmation Checklist:  Patient's identity using two indicators, relevant allergies, procedure was appropriate and matched the consent or emergent situation and correct equipment/implants were available  Time out: Immediately prior to the procedure a time out was called    Universal Protocol: the Joint Commission Universal Protocol was followed           ANESTHESIA  Anesthesia was administered and monitored by anesthesiology.  See anesthesia documentation for details.  PROCEDURE   Patient Tolerance:  Patient tolerated the procedure well with no immediate complications    Length of time physician/provider present for 1:1 monitoring during sedation: 0      Date: 9/3/2021  Preprocedure Dx: Persistent atrial fibrillation  Postprocedure: Successful conversion to normal sinus rhythm from persistent atrial fibrillation with RVR    Brief History: Willow Figueroa"Miller" MARIO Fatima is a very pleasant 81 year old female who comes in today for EP evaluation of atypical atrial flutter versus PAT.  Willow Figueroa"Miller" MARIO Fatima has a known history of paroxysmal atrial fibrillation with PVI 3/2011, repeat PVI 7/2011, recurrence of atypical atrial flutter 2013 with cardioversion, recurrence of atrial flutter 2019 with cardioversion, hypertension, hyperlipidemia, DM 2, SHAVON with CPAP.  4 days ago patient was undergoing stress and noticed her resting heart rate went from , was evaluated in the clinic " found to be in atrial flutter and presents this morning for cardioversion.    : Linh Solorzano CNP  Methods:  Time out completed.  Then Willow MARTIN Orff taken in fasting nonsedated state and underwent brief deep anesthesia per anesthesia service.    At 1039 she received 50 joules of single, biphasic, synchronized DCCV shock between right parasternal and left infrascapular hands-free pads and had prompt restoration of sinus rhythm.  Post cardioversion ECG was personally reviewed, shows sinus rhythm with first-degree AV block, PACs-ventricular rate 61, NH interval 226 ms, QRS duration 88 ms, QT/QTc 472/475 ms.  Impression:  Successful conversion to sinus rhythm  Full neurological recovery after procedure.  Patient and family updated after procedure.  Plan:  Followup in clinic with Linh Solorzano in 3-4 weeks.  Prior to Admission medications    Medication Sig Start Date End Date Taking? Authorizing Provider   allopurinol (ZYLOPRIM) 100 MG tablet [ALLOPURINOL (ZYLOPRIM) 100 MG TABLET] Take 100 mg by mouth 2 (two) times a day.        4/7/15  Yes Provider, Historical   glipiZIDE (GLUCOTROL) 10 MG tablet [GLIPIZIDE (GLUCOTROL) 10 MG TABLET] Take 10 mg by mouth 2 (two) times a day.  4/13/15  Yes Provider, Historical   insulin glargine-lixisenatide (SOLIQUA) pen Inject 36 Units Subcutaneous daily 8/17/21 8/17/22 Yes Reported, Patient   potassium chloride (KLOR-CON) 10 MEQ CR tablet [POTASSIUM CHLORIDE (KLOR-CON) 10 MEQ CR TABLET] Take 20 mEq by mouth 2 (two) times a day. 11/11/19  Yes Provider, Historical   sotalol (BETAPACE) 80 MG tablet [SOTALOL (BETAPACE) 80 MG TABLET] Take 1 tablet (80 mg total) by mouth 2 (two) times a day. 11/1/19  Yes Charisma Freeman APRN CNP   spironolactone-hydrochlorothiazide (ALDACTAZIDE) 25-25 mg tablet [SPIRONOLACTONE-HYDROCHLOROTHIAZIDE (ALDACTAZIDE) 25-25 MG TABLET] 1/2 tab 5/26/20  Yes Provider, Historical   warfarin (COUMADIN) 2.5 MG tablet [WARFARIN (COUMADIN) 2.5 MG TABLET] Adjust  dose based on INR results as directed. 4/13/15  Yes Provider, Historical     Continue anticoagulation of warfarin  Med changes as follows: None needed  Continue all other meds as before.  INR in 1 week.  Discharge to home when stable and at least 1 hr after cardioversion.      Linh Solorzano, APRN, CNP  9/3/2021

## 2021-09-03 NOTE — ANESTHESIA PREPROCEDURE EVALUATION
Anesthesia Pre-Procedure Evaluation    Patient: Willow Fatima   MRN: [id number] : 1940        Preoperative Diagnosis: * No surgery found *   Procedure :      Past Medical History:   Diagnosis Date     Atrial fibrillation (H)     [id number] score of 7-on chronic warfarin Previously failed Sotalol and Multaq therapy PVI 3/11 Repeat PVI   Recurrent A fib, most recent CV 2013 Rx sotalol      Atrial flutter (H)     Atypical flutter with RVR after second PVI in 2011       Esophageal reflux     Created by Conversion      Essential hypertension           Status post catheter ablation of atrial fibrillation 6/3/2019    PVI 3/11(PVI/Cryo + CTI line) Repeat PVI  (PVI/RF + CFE + Roof line + GREGORIO line)      Past Surgical History:   Procedure Laterality Date     HC REMOVE TONSILS/ADENOIDS,<11 Y/O      Description: Tonsillectomy With Adenoidectomy;  Recorded: 10/17/2012;     HC REVISE MEDIAN N/CARPAL TUNNEL SURG      Description: Neuroplasty Decompression Median Nerve At Carpal Tunnel;  Recorded: 10/17/2012;     CA ABLATE HEART DYSRHYTHM FOCUS      Description: Catheter Ablation Atrial Fibrillation;  Recorded: 2013;  Comments: PVI 3-7-2011 (PVI/cryo + CTI line); ; Repeat PVI 2011 (PVI/RF + CFE + Roof line + GREGORIO line)     CA ABLATE HEART DYSRHYTHM FOCUS      Description: Catheter Ablation Atrial Fibrillation;  Recorded: 2014;  Comments: PVI 3-7-2011 (PVI/cryo + CTI line); ; Repeat PVI 2011 (PVI/RF + CFE + Roof line + GREGORIO line)     CA CARDIOVERSION ELECTIVE ARRHYTHMIA EXTERNAL       Fib 10/17/12; ; Flutter 2/15/13; AFl 2019      Allergies   Allergen Reactions     Ace Inhibitors Cough     Empagliflozin Other (See Comments)     weakness     Metformin GI Disturbance     .  Retry 2016- gastrointestinal intolerance again      Social History     Tobacco Use     Smoking status: Former Smoker     Quit date: 1962     Years since quittin.4     Smokeless tobacco: Never Used    Substance Use Topics     Alcohol use: Yes     Comment: Alcoholic Drinks/day: 6 per month      Wt Readings from Last 1 Encounters:   09/03/21 63.2 kg (139 lb 4.8 oz)        Anesthesia Evaluation            ROS/MED HX  ENT/Pulmonary:     (+) sleep apnea, uses CPAP,     Neurologic:  - neg neurologic ROS     Cardiovascular:     (+) hypertension-----dysrhythmias, a-fib,     METS/Exercise Tolerance:     Hematologic:  - neg hematologic  ROS     Musculoskeletal:  - neg musculoskeletal ROS     GI/Hepatic:     (+) GERD,     Renal/Genitourinary:       Endo:     (+) type I DM,     Psychiatric/Substance Use:  - neg psychiatric ROS     Infectious Disease:  - neg infectious disease ROS     Malignancy:  - neg malignancy ROS     Other:  - neg other ROS          Physical Exam    Airway  airway exam normal           Respiratory Devices and Support         Dental  no notable dental history         Cardiovascular          Rhythm and rate: irregular and normal     Pulmonary   pulmonary exam normal        breath sounds clear to auscultation           OUTSIDE LABS:  CBC: No results found for: WBC, HGB, HCT, PLT  BMP: No results found for: NA, POTASSIUM, CHLORIDE, CO2, BUN, CR, GLC  COAGS: No results found for: PTT, INR, FIBR  POC: No results found for: BGM, HCG, HCGS  HEPATIC: No results found for: ALBUMIN, PROTTOTAL, ALT, AST, GGT, ALKPHOS, BILITOTAL, BILIDIRECT, ABIMAEL  OTHER: No results found for: PH, LACT, A1C, HOLLIS, PHOS, MAG, LIPASE, AMYLASE, TSH, T4, T3, CRP, SED    Anesthesia Plan    ASA Status:  3   NPO Status:  NPO Appropriate    Anesthesia Type: General.     - Airway: Mask Only   Induction: Intravenous.           Consents    Anesthesia Plan(s) and associated risks, benefits, and realistic alternatives discussed. Questions answered and patient/representative(s) expressed understanding.     - Discussed with:  Patient         Postoperative Care    Pain management: IV analgesics.        Comments:                Mendel MARTIN  MD Dimitri

## 2021-09-03 NOTE — INTERVAL H&P NOTE
I have reviewed the surgical (or preoperative) H&P that is linked to this encounter, and examined the patient. There are no significant changes.  Warfarin historically therapeutic and in range the last multiple months.

## 2021-09-03 NOTE — ANESTHESIA CARE TRANSFER NOTE
"  Patient: Willow MARTIN Orff    * No procedures listed *    Diagnosis: * No pre-op diagnosis entered *  Diagnosis Additional Information: No value filed.    Anesthesia Type:   General     Note:/71 (BP Location: Left arm)   Pulse 105   Temp 36.6  C (97.9  F) (Oral)   Resp 20   Ht 1.549 m (5' 1\")   Wt 63.2 kg (139 lb 4.8 oz)   SpO2 96%   BMI 26.32 kg/m        Oropharynx: oropharynx clear of all foreign objects  Level of Consciousness: awake  Oxygen Supplementation: nasal cannula  Level of Supplemental Oxygen (L/min / FiO2): 3  Independent Airway: airway patency satisfactory and stable  Dentition: dentition unchanged  Vital Signs Stable: post-procedure vital signs reviewed and stable  Report to RN Given: handoff report given  Destination: [facility] [place]          Vitals:  Vitals Value Taken Time   /70 09/03/21 1042   Temp     Pulse 59 09/03/21 1041   Resp 20 09/03/21 1041   SpO2 100 % 09/03/21 1041   Vitals shown include unvalidated device data.    Electronically Signed By: Chelsea Price CRNA, APRN [place]  September 3, 2021  10:42 AM  "

## 2021-09-03 NOTE — DISCHARGE INSTRUCTIONS
Cardioversion  Cardioversion is a procedure to restore your heart's normal rhythm from a fast or irregular rhythm (arrhythmia) in the top or bottom chambers of your heart. You may have the procedure in a hospital or surgery center. It's often done on an outpatient (same day) basis. During the procedure, your doctor will give you medication to keep you free from pain. Then the doctor gives you a brief electric shock. This helps your heartbeat become normal again. In most cases, you can go home within hours of the procedure.    Before Your Procedure    Tell your doctor what over-the-counter and prescription medications, herbs, and supplements you are taking.    Take medication as directed. Your doctor may prescribe anticoagulants (blood thinners), depending on your situation. They help prevent blood clots from forming.    Ask your doctor about the risks and benefits of cardioversion.    Sign your consent form.    Don t eat or drink anything for 8  hours before your procedure.    Follow any other instructions your doctor gives you.     Arrange for an adult to drive you home after the procedure.     During Your Procedure    Your health care provider will place small pads (electrodes) on your chest to record your heartbeat at all times.    Your health care provider will place an intravenous (IV) line in your arm. This gives you medication (sedation) that keeps you free of pain. You ll feel sleepy.      After Your Procedure    Your health care provider will monitor you until you are fully awake. Then you ll be able to sit up, walk, and eat.    In most cases, you ll be able to go home after the sedation wears off. This usually takes a few hours.    For a few days, the skin on your chest may feel a little sore, like a mild sunburn.    DO NOT  drive or operate heavy machinery for 24 hours after the procedure.    The day after your procedure, try to take it easy. Take medication as directed.    Call your doctor if you notice  skipped beats, a rapid heartbeat, or chest tightness. These may be signs that an irregular heartbeat has returned.

## 2021-09-03 NOTE — PLAN OF CARE
Pt converted to !st degree AVB after 50J cardioversion. Discharge instructions given to pt with a follow up. Pt discharged home.    Yamel Muhammad RN

## 2021-10-05 ENCOUNTER — OFFICE VISIT (OUTPATIENT)
Dept: CARDIOLOGY | Facility: CLINIC | Age: 81
End: 2021-10-05
Attending: NURSE PRACTITIONER
Payer: COMMERCIAL

## 2021-10-05 VITALS
BODY MASS INDEX: 26.26 KG/M2 | HEART RATE: 69 BPM | DIASTOLIC BLOOD PRESSURE: 60 MMHG | SYSTOLIC BLOOD PRESSURE: 104 MMHG | RESPIRATION RATE: 12 BRPM | WEIGHT: 139 LBS

## 2021-10-05 DIAGNOSIS — I48.4 ATYPICAL ATRIAL FLUTTER (H): ICD-10-CM

## 2021-10-05 DIAGNOSIS — Z98.890 STATUS POST CATHETER ABLATION OF ATRIAL FIBRILLATION: ICD-10-CM

## 2021-10-05 DIAGNOSIS — G47.33 OSA ON CPAP: ICD-10-CM

## 2021-10-05 DIAGNOSIS — I48.0 PAROXYSMAL ATRIAL FIBRILLATION (H): ICD-10-CM

## 2021-10-05 DIAGNOSIS — I48.19 PERSISTENT ATRIAL FIBRILLATION (H): Primary | ICD-10-CM

## 2021-10-05 PROCEDURE — 99214 OFFICE O/P EST MOD 30 MIN: CPT | Performed by: NURSE PRACTITIONER

## 2021-10-05 RX ORDER — SOTALOL HYDROCHLORIDE 80 MG/1
80 TABLET ORAL 2 TIMES DAILY
Qty: 180 TABLET | Refills: 3 | Status: ON HOLD
Start: 2021-10-05 | End: 2023-09-06

## 2021-10-05 NOTE — PROGRESS NOTES
"      Assessment/Recommendations     Assessment:      Persistent atypical atrial flutter-known history of atrial fibrillation and atrial flutter; PVI 3/2011 and repeat PVI 7/2011.  Recurrence of atypical atrial flutter 2013 with cardioversion at that time.  Maintained on sotalol since 2013.  Atrial flutter recurrence 10/29/2019 with cardioversion 11/2019.  8/28/2021 her heart rate went from 60s to persistent 110-115 and was symptomatic with weakness and fatigue.  9/3/2021 cardioversion.  Presents today for follow-up and has had no recurrence of atrial flutter    -     Continue sotalol 80 mg p.o. twice daily-BMP and EKG recently done and reviewed  -     Discussed triggers of atrial fibrillation and atrial flutter, discussed rate versus rhythm control.    Due to patient being highly symptomatic with atrial fibrillation/atrial flutter recommend continuing with rhythm control        -     Patient has been on warfarin therapy for years with stable INRs historically.         -     Discussed possible triggers of atrial fibrillation including lack of sleep, stress, dehydration, overconsumption of alcohol, nicotine, untreated SHAVON      Status post catheter ablation of atrial fibrillation     SHAVON on CPAP patient reports 100% compliance with her CPAP        [id number] score of 5: 2 age, 1 gender, 1 hypertension, 1 DM and on warfarin.      Willow Fatima will follow up in 6 to 12 months or sooner if needed.       History of Present Illness/Subjective    Ms. Willow Fatima is a 81 year old female seen at [facility] today for follow-up after cardioversion for persistent atypical atrial flutter.      Willow \"Ellyn\" MARIO Fatima is a very pleasant 81 year old female who comes in today for EP evaluation of atypical atrial flutter versus PAT.  Willow \"Ellyn\" MARIO Fatima has a known history of paroxysmal atrial fibrillation with PVI 3/2011, repeat PVI 7/2011, recurrence of atypical atrial flutter 2013 with " cardioversion, recurrence of atrial flutter 2019 with cardioversion, hypertension, hyperlipidemia, DM 2, SHAVON with CPAP.    Patient reports having no recurrence of atrial flutter and feeling quite well.  She just returned from a trip to [place] and had no issues.  She denies fatigue, lightheadedness, shortness of breath, dyspnea on exertion, orthopnea, PND, palpitations, chest pain, abdominal fullness/bloating and lower extremity edema.        Cardiographics (reviewed):  6/4/2019 Holter       Physical Examination Review of Systems   Vitals: There were no vitals taken for this visit.  BMI= There is no height or weight on file to calculate BMI.  Wt Readings from Last 3 Encounters:   09/03/21 63.2 kg (139 lb 4.8 oz)   09/01/21 64.3 kg (141 lb 12.8 oz)   07/08/20 63 kg (139 lb)       General Appearance:   Alert, cooperative and in no acute distress.   ENT/Mouth: membranes moist, no facial drooping   EYES:  no scleral icterus, normal conjunctivae   Neck: no JVD   Chest/Lungs:   lungs are clear to auscultation, no rales or wheezing, respirations unlabored   Cardiovascular:   Regular. Normal first and second heart sounds with no murmurs, rubs, or gallops; the radial and posterior tibial pulses are intact, no edema bilateral lower extremities    Abdomen:  Soft, nontender, nondistended, bowel sounds present   Extremities: no cyanosis or clubbing   Skin: warm, dry.    Neurologic: mood and affect are appropriate, alert and oriented x3         Please refer above for cardiac ROS details.      Medical History  Surgical History Family History Social History   Past Medical History:   Diagnosis Date     Atrial fibrillation (H)     [id number] score of 7-on chronic warfarin Previously failed Sotalol and Multaq therapy PVI 3/11 Repeat PVI 7/11  Recurrent A fib, most recent CV Feb 2013 Rx sotalol      Atrial flutter (H)     Atypical flutter with RVR after second PVI in July 2011       Esophageal reflux     Created by Conversion       Essential hypertension           Status post catheter ablation of atrial fibrillation 6/3/2019    PVI 3/11(PVI/Cryo + CTI line) Repeat PVI  (PVI/RF + CFE + Roof line + GREGORIO line)     Past Surgical History:   Procedure Laterality Date     HC REMOVE TONSILS/ADENOIDS,<11 Y/O      Description: Tonsillectomy With Adenoidectomy;  Recorded: 10/17/2012;     HC REVISE MEDIAN N/CARPAL TUNNEL SURG      Description: Neuroplasty Decompression Median Nerve At Carpal Tunnel;  Recorded: 10/17/2012;     MT ABLATE HEART DYSRHYTHM FOCUS      Description: Catheter Ablation Atrial Fibrillation;  Recorded: 2013;  Comments: PVI 3-7-2011 (PVI/cryo + CTI line); ; Repeat PVI 2011 (PVI/RF + CFE + Roof line + GREGORIO line)     MT ABLATE HEART DYSRHYTHM FOCUS      Description: Catheter Ablation Atrial Fibrillation;  Recorded: 2014;  Comments: PVI 3-7-2011 (PVI/cryo + CTI line); ; Repeat PVI 2011 (PVI/RF + CFE + Roof line + GREGORIO line)     MT CARDIOVERSION ELECTIVE ARRHYTHMIA EXTERNAL       Fib 10/17/12; ; Flutter 2/15/13; AFl 2019     Family History   Problem Relation Age of Onset     Aneurysm Mother         brain     Emphysema Father      Lung Cancer Sister      Hypertension Brother      Lung Cancer Sister      Lung Cancer Sister      Breast Cancer Sister     Social History     Socioeconomic History     Marital status:      Spouse name: Not on file     Number of children: 3     Years of education: Not on file     Highest education level: Not on file   Occupational History     Not on file   Tobacco Use     Smoking status: Former Smoker     Quit date: 1962     Years since quittin.5     Smokeless tobacco: Never Used   Substance and Sexual Activity     Alcohol use: Yes     Comment: Alcoholic Drinks/day: 6 per month     Drug use: No     Sexual activity: Yes   Other Topics Concern     Not on file   Social History Narrative     Not on file     Social Determinants of Health     Financial Resource Strain:       Difficulty of Paying Living Expenses:    Food Insecurity:      Worried About Running Out of Food in the Last Year:      Ran Out of Food in the Last Year:    Transportation Needs:      Lack of Transportation (Medical):      Lack of Transportation (Non-Medical):    Physical Activity:      Days of Exercise per Week:      Minutes of Exercise per Session:    Stress:      Feeling of Stress :    Social Connections:      Frequency of Communication with Friends and Family:      Frequency of Social Gatherings with Friends and Family:      Attends [employer] Services:      Active Member of Clubs or Organizations:      Attends Club or Organization Meetings:      Marital Status:    Intimate Partner Violence:      Fear of Current or Ex-Partner:      Emotionally Abused:      Physically Abused:      Sexually Abused:           Medications  Allergies   Current Outpatient Medications   Medication Sig Dispense Refill     allopurinol (ZYLOPRIM) 100 MG tablet [ALLOPURINOL (ZYLOPRIM) 100 MG TABLET] Take 100 mg by mouth 2 (two) times a day.              glipiZIDE (GLUCOTROL) 10 MG tablet [GLIPIZIDE (GLUCOTROL) 10 MG TABLET] Take 10 mg by mouth 2 (two) times a day.        insulin glargine-lixisenatide (SOLIQUA) pen Inject 36 Units Subcutaneous daily       potassium chloride (KLOR-CON) 10 MEQ CR tablet [POTASSIUM CHLORIDE (KLOR-CON) 10 MEQ CR TABLET] Take 20 mEq by mouth 2 (two) times a day.       sotalol (BETAPACE) 80 MG tablet [SOTALOL (BETAPACE) 80 MG TABLET] Take 1 tablet (80 mg total) by mouth 2 (two) times a day. 180 tablet 3     spironolactone-hydrochlorothiazide (ALDACTAZIDE) 25-25 mg tablet [SPIRONOLACTONE-HYDROCHLOROTHIAZIDE (ALDACTAZIDE) 25-25 MG TABLET] 1/2 tab       warfarin (COUMADIN) 2.5 MG tablet [WARFARIN (COUMADIN) 2.5 MG TABLET] Adjust dose based on INR results as directed.      Allergies   Allergen Reactions     Ace Inhibitors Cough     Empagliflozin Other (See Comments)     weakness     Metformin GI Disturbance     2013.   Retry 2016- gastrointestinal intolerance again         Lab Results    Chemistry/lipid CBC Cardiac Enzymes/BNP/TSH/INR   No results for input(s): CHOL, HDL, LDL, TRIG, CHOLHDLRATIO in the last 99143 hours.  No results for input(s): LDL in the last 95147 hours.  Recent Labs   Lab Test 09/03/21  0917      POTASSIUM 4.6   CHLORIDE 105   CO2 19*   *   BUN 20   CR 1.00   GFRESTIMATED 53*   HOLLIS 9.9     Recent Labs   Lab Test 09/03/21  0917   CR 1.00     No results for input(s): A1C in the last 97467 hours. No results for input(s): WBC, HGB, HCT, MCV, PLT in the last 77028 hours.  No results for input(s): HGB in the last 03780 hours. No results for input(s): TROPONINI in the last 95303 hours.  No results for input(s): BNP, NTBNPI, NTBNP in the last 14014 hours.  No results for input(s): TSH in the last 20747 hours.  No results for input(s): INR in the last 66935 hours.     Total Time- 30 minutes spent on date of encounter doing chart review, history and exam, documentation and further activities as noted above.  This note has been dictated using voice recognition software. Any grammatical, typographical, or context distortions are unintentional and inherent to the software.    Linh Solorzano [facility]

## 2021-10-05 NOTE — LETTER
"10/5/2021    DESHAUN DIAZ TATE  [facility] [address]  [facility] [address]    RE: Willow Fatima       Dear Colleague,    I had the pleasure of seeing Willow Fatima in the [facility].          Assessment/Recommendations     Assessment:      Persistent atypical atrial flutter-known history of atrial fibrillation and atrial flutter; PVI 3/2011 and repeat PVI 7/2011.  Recurrence of atypical atrial flutter 2013 with cardioversion at that time.  Maintained on sotalol since 2013.  Atrial flutter recurrence 10/29/2019 with cardioversion 11/2019.  8/28/2021 her heart rate went from 60s to persistent 110-115 and was symptomatic with weakness and fatigue.  9/3/2021 cardioversion.  Presents today for follow-up and has had no recurrence of atrial flutter    -     Continue sotalol 80 mg p.o. twice daily-BMP and EKG recently done and reviewed  -     Discussed triggers of atrial fibrillation and atrial flutter, discussed rate versus rhythm control.    Due to patient being highly symptomatic with atrial fibrillation/atrial flutter recommend continuing with rhythm control        -     Patient has been on warfarin therapy for years with stable INRs historically.         -     Discussed possible triggers of atrial fibrillation including lack of sleep, stress, dehydration, overconsumption of alcohol, nicotine, untreated SHAVON      Status post catheter ablation of atrial fibrillation     SHAVON on CPAP patient reports 100% compliance with her CPAP        [id number] score of 5: 2 age, 1 gender, 1 hypertension, 1 DM and on warfarin.      Willow Fatima will follow up in 6 to 12 months or sooner if needed.       History of Present Illness/Subjective    Ms. Willow Fatima is a 81 year old female seen at [facility] today for follow-up after cardioversion for persistent atypical atrial flutter.      Willow \"Ellyn\" MARIO Fatima is a very pleasant 81 " "year old female who comes in today for EP evaluation of atypical atrial flutter versus PAT.  Willow \"Ellyn\" MARIO Fatima has a known history of paroxysmal atrial fibrillation with PVI 3/2011, repeat PVI 7/2011, recurrence of atypical atrial flutter 2013 with cardioversion, recurrence of atrial flutter 2019 with cardioversion, hypertension, hyperlipidemia, DM 2, SHAVON with CPAP.    Patient reports having no recurrence of atrial flutter and feeling quite well.  She just returned from a trip to [place] and had no issues.  She denies fatigue, lightheadedness, shortness of breath, dyspnea on exertion, orthopnea, PND, palpitations, chest pain, abdominal fullness/bloating and lower extremity edema.        Cardiographics (reviewed):  6/4/2019 Holter       Physical Examination Review of Systems   Vitals: There were no vitals taken for this visit.  BMI= There is no height or weight on file to calculate BMI.  Wt Readings from Last 3 Encounters:   09/03/21 63.2 kg (139 lb 4.8 oz)   09/01/21 64.3 kg (141 lb 12.8 oz)   07/08/20 63 kg (139 lb)       General Appearance:   Alert, cooperative and in no acute distress.   ENT/Mouth: membranes moist, no facial drooping   EYES:  no scleral icterus, normal conjunctivae   Neck: no JVD   Chest/Lungs:   lungs are clear to auscultation, no rales or wheezing, respirations unlabored   Cardiovascular:   Regular. Normal first and second heart sounds with no murmurs, rubs, or gallops; the radial and posterior tibial pulses are intact, no edema bilateral lower extremities    Abdomen:  Soft, nontender, nondistended, bowel sounds present   Extremities: no cyanosis or clubbing   Skin: warm, dry.    Neurologic: mood and affect are appropriate, alert and oriented x3         Please refer above for cardiac ROS details.      Medical History  Surgical History Family History Social History   Past Medical History:   Diagnosis Date     Atrial fibrillation (H)     [id number] score of 7-on chronic warfarin Previously " failed Sotalol and Multaq therapy PVI 3/11 Repeat PVI   Recurrent A fib, most recent CV 2013 Rx sotalol      Atrial flutter (H)     Atypical flutter with RVR after second PVI in 2011       Esophageal reflux     Created by Conversion      Essential hypertension           Status post catheter ablation of atrial fibrillation 6/3/2019    PVI 3/11(PVI/Cryo + CTI line) Repeat PVI  (PVI/RF + CFE + Roof line + GREGORIO line)     Past Surgical History:   Procedure Laterality Date     HC REMOVE TONSILS/ADENOIDS,<11 Y/O      Description: Tonsillectomy With Adenoidectomy;  Recorded: 10/17/2012;     HC REVISE MEDIAN N/CARPAL TUNNEL SURG      Description: Neuroplasty Decompression Median Nerve At Carpal Tunnel;  Recorded: 10/17/2012;     UT ABLATE HEART DYSRHYTHM FOCUS      Description: Catheter Ablation Atrial Fibrillation;  Recorded: 2013;  Comments: PVI 3-7-2011 (PVI/cryo + CTI line); ; Repeat PVI 2011 (PVI/RF + CFE + Roof line + GREGORIO line)     UT ABLATE HEART DYSRHYTHM FOCUS      Description: Catheter Ablation Atrial Fibrillation;  Recorded: 2014;  Comments: PVI 3-7-2011 (PVI/cryo + CTI line); ; Repeat PVI 2011 (PVI/RF + CFE + Roof line + GREGORIO line)     UT CARDIOVERSION ELECTIVE ARRHYTHMIA EXTERNAL       Fib 10/17/12; ; Flutter 2/15/13; AFl 2019     Family History   Problem Relation Age of Onset     Aneurysm Mother         brain     Emphysema Father      Lung Cancer Sister      Hypertension Brother      Lung Cancer Sister      Lung Cancer Sister      Breast Cancer Sister     Social History     Socioeconomic History     Marital status:      Spouse name: Not on file     Number of children: 3     Years of education: Not on file     Highest education level: Not on file   Occupational History     Not on file   Tobacco Use     Smoking status: Former Smoker     Quit date: 1962     Years since quittin.5     Smokeless tobacco: Never Used   Substance and Sexual Activity     Alcohol  use: Yes     Comment: Alcoholic Drinks/day: 6 per month     Drug use: No     Sexual activity: Yes   Other Topics Concern     Not on file   Social History Narrative     Not on file     Social Determinants of Health     Financial Resource Strain:      Difficulty of Paying Living Expenses:    Food Insecurity:      Worried About Running Out of Food in the Last Year:      Ran Out of Food in the Last Year:    Transportation Needs:      Lack of Transportation (Medical):      Lack of Transportation (Non-Medical):    Physical Activity:      Days of Exercise per Week:      Minutes of Exercise per Session:    Stress:      Feeling of Stress :    Social Connections:      Frequency of Communication with Friends and Family:      Frequency of Social Gatherings with Friends and Family:      Attends Yarsanism Services:      Active Member of Clubs or Organizations:      Attends Club or Organization Meetings:      Marital Status:    Intimate Partner Violence:      Fear of Current or Ex-Partner:      Emotionally Abused:      Physically Abused:      Sexually Abused:           Medications  Allergies   Current Outpatient Medications   Medication Sig Dispense Refill     allopurinol (ZYLOPRIM) 100 MG tablet [ALLOPURINOL (ZYLOPRIM) 100 MG TABLET] Take 100 mg by mouth 2 (two) times a day.              glipiZIDE (GLUCOTROL) 10 MG tablet [GLIPIZIDE (GLUCOTROL) 10 MG TABLET] Take 10 mg by mouth 2 (two) times a day.        insulin glargine-lixisenatide (SOLIQUA) pen Inject 36 Units Subcutaneous daily       potassium chloride (KLOR-CON) 10 MEQ CR tablet [POTASSIUM CHLORIDE (KLOR-CON) 10 MEQ CR TABLET] Take 20 mEq by mouth 2 (two) times a day.       sotalol (BETAPACE) 80 MG tablet [SOTALOL (BETAPACE) 80 MG TABLET] Take 1 tablet (80 mg total) by mouth 2 (two) times a day. 180 tablet 3     spironolactone-hydrochlorothiazide (ALDACTAZIDE) 25-25 mg tablet [SPIRONOLACTONE-HYDROCHLOROTHIAZIDE (ALDACTAZIDE) 25-25 MG TABLET] 1/2 tab       warfarin  (COUMADIN) 2.5 MG tablet [WARFARIN (COUMADIN) 2.5 MG TABLET] Adjust dose based on INR results as directed.      Allergies   Allergen Reactions     Ace Inhibitors Cough     Empagliflozin Other (See Comments)     weakness     Metformin GI Disturbance     2013.  Retry 2016- gastrointestinal intolerance again         Lab Results    Chemistry/lipid CBC Cardiac Enzymes/BNP/TSH/INR   No results for input(s): CHOL, HDL, LDL, TRIG, CHOLHDLRATIO in the last 67528 hours.  No results for input(s): LDL in the last 72300 hours.  Recent Labs   Lab Test 09/03/21  0917      POTASSIUM 4.6   CHLORIDE 105   CO2 19*   *   BUN 20   CR 1.00   GFRESTIMATED 53*   HOLLIS 9.9     Recent Labs   Lab Test 09/03/21  0917   CR 1.00     No results for input(s): A1C in the last 59638 hours. No results for input(s): WBC, HGB, HCT, MCV, PLT in the last 12850 hours.  No results for input(s): HGB in the last 52734 hours. No results for input(s): TROPONINI in the last 58157 hours.  No results for input(s): BNP, NTBNPI, NTBNP in the last 14301 hours.  No results for input(s): TSH in the last 98699 hours.  No results for input(s): INR in the last 79955 hours.     Total Time- 30 minutes spent on date of encounter doing chart review, history and exam, documentation and further activities as noted above.  This note has been dictated using voice recognition software. Any grammatical, typographical, or context distortions are unintentional and inherent to the software.    LONNY Francis [facility]        Thank you for allowing me to participate in the care of your patient.      Sincerely,     TEODORO Francis CNP [facility]  cc:   TEODORO Francis CNP  [address] [place] [address]

## 2022-03-23 ENCOUNTER — OFFICE VISIT (OUTPATIENT)
Dept: CARDIOLOGY | Facility: CLINIC | Age: 82
End: 2022-03-23
Payer: COMMERCIAL

## 2022-03-23 VITALS
SYSTOLIC BLOOD PRESSURE: 128 MMHG | WEIGHT: 145 LBS | HEIGHT: 61 IN | BODY MASS INDEX: 27.38 KG/M2 | DIASTOLIC BLOOD PRESSURE: 72 MMHG | RESPIRATION RATE: 16 BRPM | HEART RATE: 70 BPM

## 2022-03-23 DIAGNOSIS — I48.4 ATYPICAL ATRIAL FLUTTER (H): ICD-10-CM

## 2022-03-23 DIAGNOSIS — I48.19 PERSISTENT ATRIAL FIBRILLATION (H): Primary | ICD-10-CM

## 2022-03-23 PROCEDURE — 99213 OFFICE O/P EST LOW 20 MIN: CPT | Performed by: NURSE PRACTITIONER

## 2022-03-23 RX ORDER — GABAPENTIN 100 MG/1
100 CAPSULE ORAL 2 TIMES DAILY
COMMUNITY
Start: 2022-01-14 | End: 2024-05-22

## 2022-03-23 RX ORDER — ATORVASTATIN CALCIUM 40 MG/1
1 TABLET, FILM COATED ORAL DAILY
COMMUNITY
Start: 2022-02-07

## 2022-03-23 RX ORDER — BLOOD SUGAR DIAGNOSTIC
STRIP MISCELLANEOUS
COMMUNITY
Start: 2021-11-08

## 2022-03-23 RX ORDER — LANCETS 33 GAUGE
EACH MISCELLANEOUS
COMMUNITY
Start: 2021-11-08

## 2022-03-23 NOTE — PROGRESS NOTES
"      Assessment/Recommendations   Assessment:      1.  Persistent atypical atrial flutter- history of paroxysmal atrial fibrillation; PVI 3/2011 and repeat PVI 7/2011.  Recurrence of atypical atrial flutter 2013 with cardioversion at that time.  Maintained on sotalol since 2013.  Atrial flutter recurrence 10/29/2019 with cardioversion 11/2019.  8/28/2021 recurrence atrial flutter.  9/3/2021 cardioversion.  Presents today for follow-up and has had no recurrence of atrial flutter     -     Continue sotalol 80 mg p.o. twice daily-BMP and EKG recently done within the last 6 months and reviewed  -     Discussed triggers of atrial fibrillation and atrial flutter, discussed rate versus rhythm control.    Due to patient being highly symptomatic with atrial fibrillation/atrial flutter recommend continuing with rhythm control        -     Patient has been on warfarin therapy for years with stable INRs historically.         -     Discussed possible triggers of atrial fibrillation including lack of sleep, stress, dehydration, overconsumption of alcohol, nicotine, untreated SHAVON         2. SHAVON on CPAP - 100% compliance with her CPAP        [id number] score of 5: 2 age, 1 gender, 1 hypertension, 1 DM and on warfarin.     Willow Fatima will follow up in 6 for EKG and BMP (sotalol use)     History of Present Illness/Subjective    Ms. Willow Fatima is a 82 year old female seen at [facility] today for management of atypical atrial flutter with sotalol use.      Willow Fatima has a known history of atrial fibrillation, atrial flutter, status post PVI x2, hypertension, hyperlipidemia, diabetes type 2, SHAVON with CPAP.    Ellyn tells me she is feeling great.  She is very active \"for an 82-year-old.\".  She has had no recurrence of atrial fibrillation or atrial flutter for 6 months now.  Today she denies fatigue, lightheadedness, shortness of breath, dyspnea on exertion, orthopnea, PND, palpitations, chest pain, " "abdominal fullness/bloating and lower extremity edema.      Cardiographics (reviewed):    Cardiac testing personally reviewed:       Physical Examination Review of Systems   Vitals: /72 (BP Location: Right arm, Patient Position: Sitting, Cuff Size: Adult Regular)   Pulse 70   Resp 16   Ht 1.549 m (5' 1\")   Wt 65.8 kg (145 lb)   BMI 27.40 kg/m    BMI= Body mass index is 27.4 kg/m .  Wt Readings from Last 3 Encounters:   03/23/22 65.8 kg (145 lb)   10/05/21 63 kg (139 lb)   09/03/21 63.2 kg (139 lb 4.8 oz)       General Appearance:   Alert, cooperative and in no acute distress.   ENT/Mouth: membranes moist, no facial drooping   EYES:  no scleral icterus, normal conjunctivae   Neck: no JVD   Chest/Lungs:   lungs are clear to auscultation, no rales or wheezing, respirations unlabored   Cardiovascular:   Regular. Normal first and second heart sounds with no murmurs, rubs, or gallops; the radial and posterior tibial pulses are intact, no edema bilateral lower extremities    Abdomen:  Soft, nontender, nondistended, bowel sounds present   Extremities: no cyanosis or clubbing   Skin: warm, dry.    Neurologic: mood and affect are appropriate, alert and oriented x3         Please refer above for cardiac ROS details.      Medical History  Surgical History Family History Social History   Past Medical History:   Diagnosis Date     Atrial fibrillation (H)     [id number] score of 7-on chronic warfarin Previously failed Sotalol and Multaq therapy PVI 3/11 Repeat PVI 7/11  Recurrent A fib, most recent CV Feb 2013 Rx sotalol      Atrial flutter (H)     Atypical flutter with RVR after second PVI in July 2011       Esophageal reflux     Created by Conversion      Essential hypertension           Status post catheter ablation of atrial fibrillation 6/3/2019    PVI 3/11(PVI/Cryo + CTI line) Repeat PVI 7/11 (PVI/RF + CFE + Roof line + GREGORIO line)     Past Surgical History:   Procedure Laterality Date     HC REMOVE " TONSILS/ADENOIDS,<11 Y/O      Description: Tonsillectomy With Adenoidectomy;  Recorded: 10/17/2012;     HC REVISE MEDIAN N/CARPAL TUNNEL SURG      Description: Neuroplasty Decompression Median Nerve At Carpal Tunnel;  Recorded: 10/17/2012;     OK ABLATE HEART DYSRHYTHM FOCUS      Description: Catheter Ablation Atrial Fibrillation;  Recorded: 2013;  Comments: PVI 3-7-2011 (PVI/cryo + CTI line); ; Repeat PVI 2011 (PVI/RF + CFE + Roof line + GREGORIO line)     OK ABLATE HEART DYSRHYTHM FOCUS      Description: Catheter Ablation Atrial Fibrillation;  Recorded: 2014;  Comments: PVI 3-7-2011 (PVI/cryo + CTI line); ; Repeat PVI 2011 (PVI/RF + CFE + Roof line + GREGORIO line)     OK CARDIOVERSION ELECTIVE ARRHYTHMIA EXTERNAL       Fib 10/17/12; ; Flutter 2/15/13; AFl 2019     Family History   Problem Relation Age of Onset     Aneurysm Mother         brain     Emphysema Father      Lung Cancer Sister      Hypertension Brother      Lung Cancer Sister      Lung Cancer Sister      Breast Cancer Sister     Social History     Socioeconomic History     Marital status:      Spouse name: Not on file     Number of children: 3     Years of education: Not on file     Highest education level: Not on file   Occupational History     Not on file   Tobacco Use     Smoking status: Former Smoker     Quit date: 1962     Years since quittin.9     Smokeless tobacco: Never Used   Substance and Sexual Activity     Alcohol use: Yes     Comment: Alcoholic Drinks/day: 6 per month     Drug use: No     Sexual activity: Yes   Other Topics Concern     Not on file   Social History Narrative     Not on file     Social Determinants of Health     Financial Resource Strain: Not on file   Food Insecurity: Not on file   Transportation Needs: Not on file   Physical Activity: Not on file   Stress: Not on file   Social Connections: Not on file   Intimate Partner Violence: Not on file   Housing Stability: Not on file           Medications  Allergies   Current Outpatient Medications   Medication Sig Dispense Refill     allopurinol (ZYLOPRIM) 100 MG tablet [ALLOPURINOL (ZYLOPRIM) 100 MG TABLET] Take 100 mg by mouth 2 (two) times a day.              atorvastatin (LIPITOR) 40 MG tablet Take 1 tablet by mouth daily       gabapentin (NEURONTIN) 100 MG capsule 1 po q hs for 3-7 days then 2 po q hs for 3-7 days then 3 po q hs for 3-7 days.  May increase slowly up to 6 tabs at bedtime if needed to control neuropathy in feet.       glipiZIDE (GLUCOTROL) 10 MG tablet Take 20 mg by mouth daily        insulin glargine-lixisenatide (SOLIQUA) pen Inject 36 Units Subcutaneous daily       Lancets (ONETOUCH DELICA PLUS [id number]) MISC Test once daily varying time,       ONETOUCH VERIO IQ test strip Test 3 times a day.       potassium chloride (KLOR-CON) 10 MEQ CR tablet [POTASSIUM CHLORIDE (KLOR-CON) 10 MEQ CR TABLET] Take 20 mEq by mouth 2 (two) times a day.       sotalol (BETAPACE) 80 MG tablet Take 1 tablet (80 mg) by mouth 2 times daily 180 tablet 3     spironolactone-hydrochlorothiazide (ALDACTAZIDE) 25-25 mg tablet [SPIRONOLACTONE-HYDROCHLOROTHIAZIDE (ALDACTAZIDE) 25-25 MG TABLET] 1/2 tab       warfarin (COUMADIN) 2.5 MG tablet [WARFARIN (COUMADIN) 2.5 MG TABLET] Adjust dose based on INR results as directed.      Allergies   Allergen Reactions     Ace Inhibitors Cough     Empagliflozin Other (See Comments)     weakness     Irbesartan Other (See Comments)     exhaustion     Metformin GI Disturbance     2013.  Retry 2016- gastrointestinal intolerance again     Pioglitazone Other (See Comments)     Hand leg and facial swelling         Lab Results    Chemistry/lipid CBC Cardiac Enzymes/BNP/TSH/INR   Lab Results   Component Value Date    BUN 20 09/03/2021     09/03/2021    CO2 19 (L) 09/03/2021    No results found for: WBC, HGB, HCT, MCV, PLT No results found for: TROPONINI  No results found for: BNP, NTBNPI, NTBNP  No results found for:  TSH  No results found for: INR     Total Time- 25 minutes spent on date of encounter doing chart review, history and exam, documentation and further activities as noted above.  This note has been dictated using voice recognition software. Any grammatical, typographical, or context distortions are unintentional and inherent to the software.    Linh Solorzano [facility]

## 2022-03-23 NOTE — LETTER
"3/23/2022    DESHAUN YBARRA  [facility] [address]  [facility] [id number]    RE: Willow Fatima       Dear Colleague,     I had the pleasure of seeing Willow Fatima in the [facility].        Assessment/Recommendations   Assessment:      1.  Persistent atypical atrial flutter- history of paroxysmal atrial fibrillation; PVI 3/2011 and repeat PVI 7/2011.  Recurrence of atypical atrial flutter 2013 with cardioversion at that time.  Maintained on sotalol since 2013.  Atrial flutter recurrence 10/29/2019 with cardioversion 11/2019.  8/28/2021 recurrence atrial flutter.  9/3/2021 cardioversion.  Presents today for follow-up and has had no recurrence of atrial flutter     -     Continue sotalol 80 mg p.o. twice daily-BMP and EKG recently done within the last 6 months and reviewed  -     Discussed triggers of atrial fibrillation and atrial flutter, discussed rate versus rhythm control.    Due to patient being highly symptomatic with atrial fibrillation/atrial flutter recommend continuing with rhythm control        -     Patient has been on warfarin therapy for years with stable INRs historically.         -     Discussed possible triggers of atrial fibrillation including lack of sleep, stress, dehydration, overconsumption of alcohol, nicotine, untreated SHAVON         2. SHAVON on CPAP - 100% compliance with her CPAP        [id number] score of 5: 2 age, 1 gender, 1 hypertension, 1 DM and on warfarin.     Willow Fatima will follow up in 6 for EKG and BMP (sotalol use)     History of Present Illness/Subjective    Ms. Willow Fatima is a 82 year old female seen at [facility] today for management of atypical atrial flutter with sotalol use.      Willow Fatima has a known history of atrial fibrillation, atrial flutter, status post PVI x2, hypertension, hyperlipidemia, diabetes type 2, SHAVON with CPAP.    Ellyn tells me she is feeling great.  She is very active \"for an " "82-year-old.\".  She has had no recurrence of atrial fibrillation or atrial flutter for 6 months now.  Today she denies fatigue, lightheadedness, shortness of breath, dyspnea on exertion, orthopnea, PND, palpitations, chest pain, abdominal fullness/bloating and lower extremity edema.      Cardiographics (reviewed):    Cardiac testing personally reviewed:       Physical Examination Review of Systems   Vitals: /72 (BP Location: Right arm, Patient Position: Sitting, Cuff Size: Adult Regular)   Pulse 70   Resp 16   Ht 1.549 m (5' 1\")   Wt 65.8 kg (145 lb)   BMI 27.40 kg/m    BMI= Body mass index is 27.4 kg/m .  Wt Readings from Last 3 Encounters:   03/23/22 65.8 kg (145 lb)   10/05/21 63 kg (139 lb)   09/03/21 63.2 kg (139 lb 4.8 oz)       General Appearance:   Alert, cooperative and in no acute distress.   ENT/Mouth: membranes moist, no facial drooping   EYES:  no scleral icterus, normal conjunctivae   Neck: no JVD   Chest/Lungs:   lungs are clear to auscultation, no rales or wheezing, respirations unlabored   Cardiovascular:   Regular. Normal first and second heart sounds with no murmurs, rubs, or gallops; the radial and posterior tibial pulses are intact, no edema bilateral lower extremities    Abdomen:  Soft, nontender, nondistended, bowel sounds present   Extremities: no cyanosis or clubbing   Skin: warm, dry.    Neurologic: mood and affect are appropriate, alert and oriented x3         Please refer above for cardiac ROS details.      Medical History  Surgical History Family History Social History   Past Medical History:   Diagnosis Date     Atrial fibrillation (H)     [id number] score of 7-on chronic warfarin Previously failed Sotalol and Multaq therapy PVI 3/11 Repeat PVI 7/11  Recurrent A fib, most recent CV Feb 2013 Rx sotalol      Atrial flutter (H)     Atypical flutter with RVR after second PVI in July 2011       Esophageal reflux     Created by Conversion      Essential hypertension           " Status post catheter ablation of atrial fibrillation 6/3/2019    PVI 3/11(PVI/Cryo + CTI line) Repeat PVI  (PVI/RF + CFE + Roof line + GREGORIO line)     Past Surgical History:   Procedure Laterality Date     HC REMOVE TONSILS/ADENOIDS,<13 Y/O      Description: Tonsillectomy With Adenoidectomy;  Recorded: 10/17/2012;     HC REVISE MEDIAN N/CARPAL TUNNEL SURG      Description: Neuroplasty Decompression Median Nerve At Carpal Tunnel;  Recorded: 10/17/2012;     [place] ABLATE HEART DYSRHYTHM FOCUS      Description: Catheter Ablation Atrial Fibrillation;  Recorded: 2013;  Comments: PVI 3-7-2011 (PVI/cryo + CTI line); ; Repeat PVI 2011 (PVI/RF + CFE + Roof line + GREGORIO line)     [place] ABLATE HEART DYSRHYTHM FOCUS      Description: Catheter Ablation Atrial Fibrillation;  Recorded: 2014;  Comments: PVI 3-7-2011 (PVI/cryo + CTI line); ; Repeat PVI 2011 (PVI/RF + CFE + Roof line + GREGORIO line)     [place] CARDIOVERSION ELECTIVE ARRHYTHMIA EXTERNAL       Fib 10/17/12; ; Flutter 2/15/13; AFl 2019     Family History   Problem Relation Age of Onset     Aneurysm Mother         brain     Emphysema Father      Lung Cancer Sister      Hypertension Brother      Lung Cancer Sister      Lung Cancer Sister      Breast Cancer Sister     Social History     Socioeconomic History     Marital status:      Spouse name: Not on file     Number of children: 3     Years of education: Not on file     Highest education level: Not on file   Occupational History     Not on file   Tobacco Use     Smoking status: Former Smoker     Quit date: 1962     Years since quittin.9     Smokeless tobacco: Never Used   Substance and Sexual Activity     Alcohol use: Yes     Comment: Alcoholic Drinks/day: 6 per month     Drug use: No     Sexual activity: Yes   Other Topics Concern     Not on file   Social History Narrative     Not on file     Social Determinants of Health     Financial Resource Strain: Not on file   Food Insecurity: Not on  file   Transportation Needs: Not on file   Physical Activity: Not on file   Stress: Not on file   Social Connections: Not on file   Intimate Partner Violence: Not on file   Housing Stability: Not on file          Medications  Allergies   Current Outpatient Medications   Medication Sig Dispense Refill     allopurinol (ZYLOPRIM) 100 MG tablet [ALLOPURINOL (ZYLOPRIM) 100 MG TABLET] Take 100 mg by mouth 2 (two) times a day.              atorvastatin (LIPITOR) 40 MG tablet Take 1 tablet by mouth daily       gabapentin (NEURONTIN) 100 MG capsule 1 po q hs for 3-7 days then 2 po q hs for 3-7 days then 3 po q hs for 3-7 days.  May increase slowly up to 6 tabs at bedtime if needed to control neuropathy in feet.       glipiZIDE (GLUCOTROL) 10 MG tablet Take 20 mg by mouth daily        insulin glargine-lixisenatide (SOLIQUA) pen Inject 36 Units Subcutaneous daily       Lancets (ONETOUCH DELICA PLUS [id number]) MISC Test once daily varying time,       ONETOUCH VERIO IQ test strip Test 3 times a day.       potassium chloride (KLOR-CON) 10 MEQ CR tablet [POTASSIUM CHLORIDE (KLOR-CON) 10 MEQ CR TABLET] Take 20 mEq by mouth 2 (two) times a day.       sotalol (BETAPACE) 80 MG tablet Take 1 tablet (80 mg) by mouth 2 times daily 180 tablet 3     spironolactone-hydrochlorothiazide (ALDACTAZIDE) 25-25 mg tablet [SPIRONOLACTONE-HYDROCHLOROTHIAZIDE (ALDACTAZIDE) 25-25 MG TABLET] 1/2 tab       warfarin (COUMADIN) 2.5 MG tablet [WARFARIN (COUMADIN) 2.5 MG TABLET] Adjust dose based on INR results as directed.      Allergies   Allergen Reactions     Ace Inhibitors Cough     Empagliflozin Other (See Comments)     weakness     Irbesartan Other (See Comments)     exhaustion     Metformin GI Disturbance     2013.  Retry 2016- gastrointestinal intolerance again     Pioglitazone Other (See Comments)     Hand leg and facial swelling         Lab Results    Chemistry/lipid CBC Cardiac Enzymes/BNP/TSH/INR   Lab Results   Component Value Date    BUN 20  09/03/2021     09/03/2021    CO2 19 (L) 09/03/2021    No results found for: WBC, HGB, HCT, MCV, PLT No results found for: TROPONINI  No results found for: BNP, NTBNPI, NTBNP  No results found for: TSH  No results found for: INR     Total Time- 25 minutes spent on date of encounter doing chart review, history and exam, documentation and further activities as noted above.  This note has been dictated using voice recognition software. Any grammatical, typographical, or context distortions are unintentional and inherent to the software.    LONNY Francis [facility]iology        Thank you for allowing me to participate in the care of your patient.      Sincerely,     TEODORO Francis CNP [facility]  cc:   Rosy Peter MD  No address on file

## 2023-06-15 ENCOUNTER — TELEPHONE (OUTPATIENT)
Dept: CARDIOLOGY | Facility: CLINIC | Age: 83
End: 2023-06-15
Payer: COMMERCIAL

## 2023-06-15 DIAGNOSIS — I48.19 PERSISTENT ATRIAL FIBRILLATION (H): Primary | ICD-10-CM

## 2023-06-15 NOTE — TELEPHONE ENCOUNTER
[facility] Call Center    Phone Message    May a detailed message be left on voicemail: yes     Reason for Call: Other: Pt is currently in AFib and would like to be seen ASAP, soonest appt for Doctor Cj is 6/21/23. Pt states can not wait that long she un not feeling well. Please reach out to pt to further discuss.     Action Taken: Other: Cardiology    Travel Screening: Not Applicable     Thank you!  [facility]

## 2023-06-15 NOTE — TELEPHONE ENCOUNTER
Pt has not been seen in > 1 yr  Needs to be seen by EP HEAVEN or MD, ok to use EP [facility] spot  Msg sent to scheduling to arrange apt  6/15/2023 8:53 AM  Karina Maguire RN

## 2023-06-16 ENCOUNTER — OFFICE VISIT (OUTPATIENT)
Dept: CARDIOLOGY | Facility: CLINIC | Age: 83
End: 2023-06-16
Attending: INTERNAL MEDICINE
Payer: COMMERCIAL

## 2023-06-16 ENCOUNTER — PREP FOR PROCEDURE (OUTPATIENT)
Dept: CARDIOLOGY | Facility: CLINIC | Age: 83
End: 2023-06-16

## 2023-06-16 ENCOUNTER — TELEPHONE (OUTPATIENT)
Dept: CARDIOLOGY | Facility: CLINIC | Age: 83
End: 2023-06-16

## 2023-06-16 ENCOUNTER — DOCUMENTATION ONLY (OUTPATIENT)
Dept: CARDIOLOGY | Facility: CLINIC | Age: 83
End: 2023-06-16

## 2023-06-16 VITALS
HEART RATE: 104 BPM | HEIGHT: 61 IN | RESPIRATION RATE: 16 BRPM | WEIGHT: 148 LBS | DIASTOLIC BLOOD PRESSURE: 78 MMHG | BODY MASS INDEX: 27.94 KG/M2 | SYSTOLIC BLOOD PRESSURE: 128 MMHG

## 2023-06-16 DIAGNOSIS — I48.19 PERSISTENT ATRIAL FIBRILLATION (H): ICD-10-CM

## 2023-06-16 DIAGNOSIS — I48.19 PERSISTENT ATRIAL FIBRILLATION (H): Primary | ICD-10-CM

## 2023-06-16 DIAGNOSIS — I48.4 ATYPICAL ATRIAL FLUTTER (H): Primary | ICD-10-CM

## 2023-06-16 LAB
ANION GAP SERPL CALCULATED.3IONS-SCNC: 15 MMOL/L (ref 7–15)
BUN SERPL-MCNC: 18.5 MG/DL (ref 8–23)
CALCIUM SERPL-MCNC: 9.7 MG/DL (ref 8.8–10.2)
CHLORIDE SERPL-SCNC: 99 MMOL/L (ref 98–107)
CREAT SERPL-MCNC: 1.08 MG/DL (ref 0.51–0.95)
DEPRECATED HCO3 PLAS-SCNC: 22 MMOL/L (ref 22–29)
GFR SERPL CREATININE-BSD FRML MDRD: 51 ML/MIN/1.73M2
GLUCOSE SERPL-MCNC: 177 MG/DL (ref 70–99)
POTASSIUM SERPL-SCNC: 4.1 MMOL/L (ref 3.4–5.3)
SODIUM SERPL-SCNC: 136 MMOL/L (ref 136–145)

## 2023-06-16 PROCEDURE — 80048 BASIC METABOLIC PNL TOTAL CA: CPT

## 2023-06-16 PROCEDURE — 36415 COLL VENOUS BLD VENIPUNCTURE: CPT

## 2023-06-16 PROCEDURE — 99215 OFFICE O/P EST HI 40 MIN: CPT | Performed by: INTERNAL MEDICINE

## 2023-06-16 PROCEDURE — 93000 ELECTROCARDIOGRAM COMPLETE: CPT | Performed by: INTERNAL MEDICINE

## 2023-06-16 RX ORDER — PREGABALIN 50 MG/1
50 CAPSULE ORAL 2 TIMES DAILY
Status: ON HOLD | COMMUNITY
Start: 2022-07-06 | End: 2024-04-10

## 2023-06-16 RX ORDER — LIDOCAINE 40 MG/G
CREAM TOPICAL
Status: CANCELLED | OUTPATIENT
Start: 2023-06-16

## 2023-06-16 RX ORDER — PEN NEEDLE, DIABETIC 32GX 5/32"
NEEDLE, DISPOSABLE MISCELLANEOUS
COMMUNITY
Start: 2023-04-06

## 2023-06-16 RX ORDER — INSULIN GLARGINE AND LIXISENATIDE 100; 33 U/ML; UG/ML
50 INJECTION, SOLUTION SUBCUTANEOUS
Status: ON HOLD | COMMUNITY
Start: 2023-03-28 | End: 2024-04-10

## 2023-06-16 NOTE — PROGRESS NOTES
[facility]  Cardiac Electrophysiology  [address]  [place] [address]   Office: [phone]  Fax: [phone]     Cardiac Electrophysiology Consultation    Patient: Willow Fatima   : 1940     Referring Provider: Rapid Access  Primary Care Provider: Juan Mcmanus    CHIEF COMPLAINT/REASON FOR CONSULTATION  Atypical atrial flutter  Persistent atrial fibrillation - status post cyroablation PVI and CTI ablation 3/2011, redo PVI with LA substrate modification, LA roof line, RSPV-MVA line 2011    Assessment/Recommendations   Willow Fatima is a 83 year old female with persistent atrial fibrillation with prior status post cyroablation PVI and CTI ablation 3/2011, redo PVI with LA substrate modification, LA roof line, RSPV-MVA line 2011; HTN, SHAVON on CPAP referred via [facility] for consultation regarding atypical atrial flutter.    Atypical atrial flutter and persistent atrial fibrillation - status post cyroablation PVI and CTI ablation 3/2011, redo PVI with LA substrate modification, LA roof line, RSPV-MVA line 2011.  Predominant recurrent arrhtyhmia is atypical atrial flutter  [id number]  We reviewed atrial fibrillation and flutter physiology and management considerations including managing stroke risk, rate control, cardioversion, antiarrhythmic drug therapy, and catheter ablation.  We discussed atrial fibrillation and flutter ablation procedures, anticipated success rates, the potential need for re-do ablation vs addition of anti-arrhythmic drugs, procedural risks (including groin bleeding, tamponade, phrenic or esophageal injury, stroke, pulmonary vein stenosis) and recovery expectations.  She would prefer DCCV and atrial flutter ablation  - DCCV early next week  - atypical AFl mapping/ablation, assessment of PVI, prior roof line, RSPV-MV line, general anesthesia, continue warfarin, hold sotalol 3 days prior (likely resume for 6-12 weeks post ablation) - to  "be coordinated on Dr. Corona's schedule  - continue sotalol 80mg twice daily for now  - continue warfarin.  INRs have been therapeutic  - we discussed the ongoing importance of lifestyle modification (maintaining a healthy weight, sleep apnea diagnosis and management, alcohol avoidance) as part of a long term strategy for atrial fibrillation management      Follow up: as above         History of Present Illness   Willow Fatima is a 83 year old female with persistent atrial fibrillation with prior status post cyroablation PVI and CTI ablation 3/2011, redo PVI with LA substrate modification, LA roof line, RSPV-MVA line 7/2011; HTN, SHAVON on CPAP referred via [facility] for consultation regarding atypical atrial flutter.    Mrs. Fatima had atrial fibrillation and underwent PVI 3/2011, redo PVI 7/2011.  She developed atypical atrial flutter 2013, was started on sotalol and underwent DCCV.  She had recurrent AFl 10/29/2019 and underwent DCCV 11/2019.  She had recurrent AFl 8/28/2021 and underwent DCCV 9/3/2021.  She notes onset of lightheadedness around 6/14/2023.      She denies chest pain or syncope.       Physical Examination  Review of Systems   VITALS: /78 (BP Location: Right arm, Patient Position: Sitting, Cuff Size: Adult Large)   Pulse 104   Resp 16   Ht 1.549 m (5' 1\")   Wt 67.1 kg (148 lb)   BMI 27.96 kg/m    Wt Readings from Last 3 Encounters:   03/23/22 65.8 kg (145 lb)   10/05/21 63 kg (139 lb)   09/03/21 63.2 kg (139 lb 4.8 oz)     CONSTITUTIONAL: well nourished, comfortable, no distress  EYES:  Conjunctivae pink, sclerae clear.    E/N/T:  Oral mucosa pink  RESPIRATORY:  Respiratory effort is normal  CARDIOVASCULAR:  Tachycardic, regular, normal S1 and S2  GASTROINTESTINAL:  Abdomen without masses or tenderness  EXTREMITIES:  No clubbing or cyanosis.    MUSCULOSKELETAL:  Overall grossly normal muscle strength  SKIN:  Overall, skin warm and dry, no lesions.  NEURO/PSYCH:  Oriented x 3 with normal affect.   " Constitutional:  No weight loss or loss of appetite    Eyes:  No difficulty with vision, no double vision, no dry eyes  ENT:  No sore throat, difficulty swallowing; changes in hearing or tinnitus  Cardiovascular: As detailed above  Respiratory:  No cough  Musculoskeletal  No joint pain, muscle aches  Neurologic:  No syncope, lightheadedness, fainting spells   Hematologic: No easy bruising, excessive bleeding tendency   Gastrointestinal:  No jaundice, abdominal pain or abdominal bloating  Genitourinary: No changes in urinary habits, no trouble urinating    Psychiatric: No anxiety or depression      Medical History  Surgical History   Past Medical History:   Diagnosis Date     Atrial fibrillation (H)     [id number] score of 7-on chronic warfarin Previously failed Sotalol and Multaq therapy PVI 3/11 Repeat PVI 7/11  Recurrent A fib, most recent CV Feb 2013 Rx sotalol      Atrial flutter (H)     Atypical flutter with RVR after second PVI in July 2011       Esophageal reflux     Created by Conversion      Essential hypertension           Status post catheter ablation of atrial fibrillation 6/3/2019    PVI 3/11(PVI/Cryo + CTI line) Repeat PVI 7/11 (PVI/RF + CFE + Roof line + GREGORIO line)    Past Surgical History:   Procedure Laterality Date     HC REMOVE TONSILS/ADENOIDS,<11 Y/O      Description: Tonsillectomy With Adenoidectomy;  Recorded: 10/17/2012;     HC REVISE MEDIAN N/CARPAL TUNNEL SURG      Description: Neuroplasty Decompression Median Nerve At Carpal Tunnel;  Recorded: 10/17/2012;     VT ABLATE HEART DYSRHYTHM FOCUS      Description: Catheter Ablation Atrial Fibrillation;  Recorded: 06/20/2013;  Comments: PVI 3-7-2011 (PVI/cryo + CTI line); ; Repeat PVI July 2011 (PVI/RF + CFE + Roof line + GREGORIO line)     VT ABLATE HEART DYSRHYTHM FOCUS      Description: Catheter Ablation Atrial Fibrillation;  Recorded: 08/05/2014;  Comments: PVI 3-7-2011 (PVI/cryo + CTI line); ; Repeat PVI July 2011 (PVI/RF + CFE + Roof line + GREGORIO  line)     OR CARDIOVERSION ELECTIVE ARRHYTHMIA EXTERNAL       Fib 10/17/12; ; Flutter 2/15/13; AFl 2019         Family History Social History   Family History   Problem Relation Age of Onset     Aneurysm Mother         brain     Emphysema Father      Lung Cancer Sister      Hypertension Brother      Lung Cancer Sister      Lung Cancer Sister      Breast Cancer Sister         Social History     Tobacco Use     Smoking status: Former     Types: Cigarettes     Quit date: 1962     Years since quittin.2     Smokeless tobacco: Never   Substance Use Topics     Alcohol use: Yes     Comment: Alcoholic Drinks/day: 6 per month     Drug use: No         Medications  Allergies     Current Outpatient Medications:      allopurinol (ZYLOPRIM) 100 MG tablet, [ALLOPURINOL (ZYLOPRIM) 100 MG TABLET] Take 100 mg by mouth 2 (two) times a day.       , Disp: , Rfl:      atorvastatin (LIPITOR) 40 MG tablet, Take 1 tablet by mouth daily, Disp: , Rfl:      gabapentin (NEURONTIN) 100 MG capsule, 1 po q hs for 3-7 days then 2 po q hs for 3-7 days then 3 po q hs for 3-7 days.  May increase slowly up to 6 tabs at bedtime if needed to control neuropathy in feet., Disp: , Rfl:      glipiZIDE (GLUCOTROL) 10 MG tablet, Take 20 mg by mouth daily , Disp: , Rfl:      Lancets (ONETOUCH DELICA PLUS [id number]) MISC, Test once daily varying time,, Disp: , Rfl:      ONETOUCH VERIO IQ test strip, Test 3 times a day., Disp: , Rfl:      potassium chloride (KLOR-CON) 10 MEQ CR tablet, [POTASSIUM CHLORIDE (KLOR-CON) 10 MEQ CR TABLET] Take 20 mEq by mouth 2 (two) times a day., Disp: , Rfl:      sotalol (BETAPACE) 80 MG tablet, Take 1 tablet (80 mg) by mouth 2 times daily, Disp: 180 tablet, Rfl: 3     spironolactone-hydrochlorothiazide (ALDACTAZIDE) 25-25 mg tablet, [SPIRONOLACTONE-HYDROCHLOROTHIAZIDE (ALDACTAZIDE) 25-25 MG TABLET] 1/2 tab, Disp: , Rfl:      warfarin (COUMADIN) 2.5 MG tablet, [WARFARIN (COUMADIN) 2.5 MG TABLET] Adjust dose based on  INR results as directed., Disp: , Rfl:      Allergies   Allergen Reactions     Ace Inhibitors Cough     Empagliflozin Other (See Comments)     weakness     Irbesartan Other (See Comments)     exhaustion     Metformin GI Disturbance     2013.  Retry 2016- gastrointestinal intolerance again     Pioglitazone Other (See Comments)     Hand leg and facial swelling          Lab Results    Chemistry CBC Cardiac Enzymes/BNP/TSH/INR   Recent Labs   Lab Test 09/03/21  0917      POTASSIUM 4.6   CHLORIDE 105   CO2 19*   *   BUN 20   CR 1.00   GFRESTIMATED 53*   HOLLIS 9.9     Recent Labs   Lab Test 09/03/21  0917   CR 1.00          No results for input(s): WBC, HGB, HCT, MCV, PLT in the last 46020 hours.  No results for input(s): HGB in the last 39356 hours. No results for input(s): TROPONINI in the last 94952 hours.  No results for input(s): BNP, NTBNPI, NTBNP in the last 87894 hours.  No results for input(s): TSH in the last 07975 hours.  No results for input(s): INR in the last 65097 hours.      Data Review    ECGs (tracings independently reviewed)  6/16/2023 - typical appearing AFl, ventricular rate 105bpm  9/3/2021 (post DCCV) - SR 61bpm, NSST/T changes, QT/QTc 472/475ms  9/1/2021 - atypical appearing AFl, ventricular rate 110bpm  11/4/2019 - atypical appearing AFl, ventricular rate 117bpm       Cc: Timur Loving MD  6/16/2023  2:15 PM

## 2023-06-16 NOTE — TELEPHONE ENCOUNTER
Pre-Procedure Education    Procedure: DCCV with Charisma Freeman NP on 6/16/23 with arrival time 8:30 am    COVID: COVID policy- if pt develops COVID like symptoms prior to procedure, he/she would need to complete an at home with a rapid antigen COVID test 1-2 days prior to your procedure date. If COVID + pt is aware the procedure will need to be rescheduled, and to contact CV scheduling as soon as possible    Pre-Op H&P: Completed- Available in Epic    Education:   Contact: Reviewed via phone with pt  Pre-Procedure Instruction: NPO after midnight pre procedure, Defined NPO with pt, Remove all jewelry and leave all valuables at home, Shower prior to arrival, Sedation plan/orders, Transportation requirements and arrangements post procedure, Post-procedure follow up process, Post-procedure restrictions/expectations, and Pre-procedure letter sent- letter tab  Risks Reviewed:   N/A    Medication:   Instructions regarding anticoagulants: Coumadin- To continue anticoagulation uninterrupted through their procedure, INR scheduled for Monday 6/19/23.  Instructions regarding antiarrhythmic medication: Sotalol; Pt instructed to continue medication prior to procedure as prescribed  Instructions for medication, other than anticoagulants and antiarrhythmics listed above, given to pt: to take all morning medications with small sips of water, with the exception of OTC supplements and MVI     Important patient information for staff: None    6/16/2023 3:57 PM  Fiona Hernández RN

## 2023-06-16 NOTE — LETTER
2023    JUAN YBARRA  [address] [facility] [address]    RE: Willow Fatima       Dear Colleague,     I had the pleasure of seeing Willow Fatima in the [facility].     [facility]  Cardiac Electrophysiology  [address]  [place] [address]   Office: [phone]  Fax: [phone]     Cardiac Electrophysiology Consultation    Patient: Willow Fatima   : 1940     Referring Provider: Rapid Access  Primary Care Provider: Juan Ybarra    CHIEF COMPLAINT/REASON FOR CONSULTATION  Atypical atrial flutter  Persistent atrial fibrillation - status post cyroablation PVI and CTI ablation 3/2011, redo PVI with LA substrate modification, LA roof line, RSPV-MVA line 2011    Assessment/Recommendations   Willow Fatima is a 83 year old female with persistent atrial fibrillation with prior status post cyroablation PVI and CTI ablation 3/2011, redo PVI with LA substrate modification, LA roof line, RSPV-MVA line 2011; HTN, SHAVON on CPAP referred via [facility] for consultation regarding atypical atrial flutter.    Atypical atrial flutter and persistent atrial fibrillation - status post cyroablation PVI and CTI ablation 3/2011, redo PVI with LA substrate modification, LA roof line, RSPV-MVA line 2011.  Predominant recurrent arrhtyhmia is atypical atrial flutter  [id number] 4  We reviewed atrial fibrillation and flutter physiology and management considerations including managing stroke risk, rate control, cardioversion, antiarrhythmic drug therapy, and catheter ablation.  We discussed atrial fibrillation and flutter ablation procedures, anticipated success rates, the potential need for re-do ablation vs addition of anti-arrhythmic drugs, procedural risks (including groin bleeding, tamponade, phrenic or esophageal injury, stroke, pulmonary vein stenosis) and recovery expectations.  She would prefer DCCV and atrial flutter ablation  - DCCV early  "next week  - atypical AFl mapping/ablation, assessment of PVI, prior roof line, RSPV-MV line, general anesthesia, continue warfarin, hold sotalol 3 days prior (likely resume for 6-12 weeks post ablation) - to be coordinated on Dr. Corona's schedule  - continue sotalol 80mg twice daily for now  - continue warfarin.  INRs have been therapeutic  - we discussed the ongoing importance of lifestyle modification (maintaining a healthy weight, sleep apnea diagnosis and management, alcohol avoidance) as part of a long term strategy for atrial fibrillation management      Follow up: as above         History of Present Illness   Willow Fatima is a 83 year old female with persistent atrial fibrillation with prior status post cyroablation PVI and CTI ablation 3/2011, redo PVI with LA substrate modification, LA roof line, RSPV-MVA line 7/2011; HTN, SHAVON on CPAP referred via [facility] for consultation regarding atypical atrial flutter.    Mrs. Fatima had atrial fibrillation and underwent PVI 3/2011, redo PVI 7/2011.  She developed atypical atrial flutter 2013, was started on sotalol and underwent DCCV.  She had recurrent AFl 10/29/2019 and underwent DCCV 11/2019.  She had recurrent AFl 8/28/2021 and underwent DCCV 9/3/2021.  She notes onset of lightheadedness around 6/14/2023.      She denies chest pain or syncope.       Physical Examination  Review of Systems   VITALS: /78 (BP Location: Right arm, Patient Position: Sitting, Cuff Size: Adult Large)   Pulse 104   Resp 16   Ht 1.549 m (5' 1\")   Wt 67.1 kg (148 lb)   BMI 27.96 kg/m    Wt Readings from Last 3 Encounters:   03/23/22 65.8 kg (145 lb)   10/05/21 63 kg (139 lb)   09/03/21 63.2 kg (139 lb 4.8 oz)     CONSTITUTIONAL: well nourished, comfortable, no distress  EYES:  Conjunctivae pink, sclerae clear.    E/N/T:  Oral mucosa pink  RESPIRATORY:  Respiratory effort is normal  CARDIOVASCULAR:  Tachycardic, regular, normal S1 and S2  GASTROINTESTINAL:  Abdomen without masses or " tenderness  EXTREMITIES:  No clubbing or cyanosis.    MUSCULOSKELETAL:  Overall grossly normal muscle strength  SKIN:  Overall, skin warm and dry, no lesions.  NEURO/PSYCH:  Oriented x 3 with normal affect.   Constitutional:  No weight loss or loss of appetite    Eyes:  No difficulty with vision, no double vision, no dry eyes  ENT:  No sore throat, difficulty swallowing; changes in hearing or tinnitus  Cardiovascular: As detailed above  Respiratory:  No cough  Musculoskeletal  No joint pain, muscle aches  Neurologic:  No syncope, lightheadedness, fainting spells   Hematologic: No easy bruising, excessive bleeding tendency   Gastrointestinal:  No jaundice, abdominal pain or abdominal bloating  Genitourinary: No changes in urinary habits, no trouble urinating    Psychiatric: No anxiety or depression      Medical History  Surgical History   Past Medical History:   Diagnosis Date    Atrial fibrillation (H)     [id number] score of 7-on chronic warfarin Previously failed Sotalol and Multaq therapy PVI 3/11 Repeat PVI 7/11  Recurrent A fib, most recent CV Feb 2013 Rx sotalol     Atrial flutter (H)     Atypical flutter with RVR after second PVI in July 2011      Esophageal reflux     Created by Conversion     Essential hypertension          Status post catheter ablation of atrial fibrillation 6/3/2019    PVI 3/11(PVI/Cryo + CTI line) Repeat PVI 7/11 (PVI/RF + CFE + Roof line + GREGORIO line)    Past Surgical History:   Procedure Laterality Date    HC REMOVE TONSILS/ADENOIDS,<13 Y/O      Description: Tonsillectomy With Adenoidectomy;  Recorded: 10/17/2012;    HC REVISE MEDIAN N/CARPAL TUNNEL SURG      Description: Neuroplasty Decompression Median Nerve At Carpal Tunnel;  Recorded: 10/17/2012;    OH ABLATE HEART DYSRHYTHM FOCUS      Description: Catheter Ablation Atrial Fibrillation;  Recorded: 06/20/2013;  Comments: PVI 3-7-2011 (PVI/cryo + CTI line); ; Repeat PVI July 2011 (PVI/RF + CFE + Roof line + GREGORIO line)    OH ABLATE  HEART DYSRHYTHM FOCUS      Description: Catheter Ablation Atrial Fibrillation;  Recorded: 2014;  Comments: PVI 3-7-2011 (PVI/cryo + CTI line); ; Repeat PVI 2011 (PVI/RF + CFE + Roof line + GREGORIO line)    OR CARDIOVERSION ELECTIVE ARRHYTHMIA EXTERNAL       Fib 10/17/12; ; Flutter 2/15/13; AFl 2019         Family History Social History   Family History   Problem Relation Age of Onset    Aneurysm Mother         brain    Emphysema Father     Lung Cancer Sister     Hypertension Brother     Lung Cancer Sister     Lung Cancer Sister     Breast Cancer Sister         Social History     Tobacco Use    Smoking status: Former     Types: Cigarettes     Quit date: 1962     Years since quittin.2    Smokeless tobacco: Never   Substance Use Topics    Alcohol use: Yes     Comment: Alcoholic Drinks/day: 6 per month    Drug use: No         Medications  Allergies     Current Outpatient Medications:     allopurinol (ZYLOPRIM) 100 MG tablet, [ALLOPURINOL (ZYLOPRIM) 100 MG TABLET] Take 100 mg by mouth 2 (two) times a day.       , Disp: , Rfl:     atorvastatin (LIPITOR) 40 MG tablet, Take 1 tablet by mouth daily, Disp: , Rfl:     gabapentin (NEURONTIN) 100 MG capsule, 1 po q hs for 3-7 days then 2 po q hs for 3-7 days then 3 po q hs for 3-7 days.  May increase slowly up to 6 tabs at bedtime if needed to control neuropathy in feet., Disp: , Rfl:     glipiZIDE (GLUCOTROL) 10 MG tablet, Take 20 mg by mouth daily , Disp: , Rfl:     Lancets (ONETOUCH DELICA PLUS [id number]) MISC, Test once daily varying time,, Disp: , Rfl:     ONETOUCH VERIO IQ test strip, Test 3 times a day., Disp: , Rfl:     potassium chloride (KLOR-CON) 10 MEQ CR tablet, [POTASSIUM CHLORIDE (KLOR-CON) 10 MEQ CR TABLET] Take 20 mEq by mouth 2 (two) times a day., Disp: , Rfl:     sotalol (BETAPACE) 80 MG tablet, Take 1 tablet (80 mg) by mouth 2 times daily, Disp: 180 tablet, Rfl: 3    spironolactone-hydrochlorothiazide (ALDACTAZIDE) 25-25 mg tablet,  [SPIRONOLACTONE-HYDROCHLOROTHIAZIDE (ALDACTAZIDE) 25-25 MG TABLET] 1/2 tab, Disp: , Rfl:     warfarin (COUMADIN) 2.5 MG tablet, [WARFARIN (COUMADIN) 2.5 MG TABLET] Adjust dose based on INR results as directed., Disp: , Rfl:      Allergies   Allergen Reactions    Ace Inhibitors Cough    Empagliflozin Other (See Comments)     weakness    Irbesartan Other (See Comments)     exhaustion    Metformin GI Disturbance     2013.  Retry 2016- gastrointestinal intolerance again    Pioglitazone Other (See Comments)     Hand leg and facial swelling          Lab Results    Chemistry CBC Cardiac Enzymes/BNP/TSH/INR   Recent Labs   Lab Test 09/03/21  0917      POTASSIUM 4.6   CHLORIDE 105   CO2 19*   *   BUN 20   CR 1.00   GFRESTIMATED 53*   HOLLIS 9.9     Recent Labs   Lab Test 09/03/21  0917   CR 1.00          No results for input(s): WBC, HGB, HCT, MCV, PLT in the last 65710 hours.  No results for input(s): HGB in the last 63768 hours. No results for input(s): TROPONINI in the last 43078 hours.  No results for input(s): BNP, NTBNPI, NTBNP in the last 13187 hours.  No results for input(s): TSH in the last 73060 hours.  No results for input(s): INR in the last 84277 hours.      Data Review    ECGs (tracings independently reviewed)  6/16/2023 - typical appearing AFl, ventricular rate 105bpm  9/3/2021 (post DCCV) - SR 61bpm, NSST/T changes, QT/QTc 472/475ms  9/1/2021 - atypical appearing AFl, ventricular rate 110bpm  11/4/2019 - atypical appearing AFl, ventricular rate 117bpm       Cc: Timur Loving MD  6/16/2023  2:15 PM          Thank you for allowing me to participate in the care of your patient.      Sincerely,     Florencio Loving MD     [facility]  cc:   Timur Corona MD  [address]  [place] [address]

## 2023-06-16 NOTE — PROGRESS NOTES
H&P  PMD: []  Received [] Card OV: [x]  Date: 6/16  Dr. Loving Sent LM  []  Teach  []   Orders  I [x] P  [x]  Letter []   AC: Coumadin NP Med Review: NO changes- Pt only on AAD and/or has Device    All other AM Meds: Hold Glipizide and Soliqua       1940  Home:[phone] (home) Cell:[phone] (mobile)  Emergency Contact: Jesus Fatima [phone]  PCP: Juan Mcmanus, [phone]    +++Important patient information for CSC/Cath Lab staff : None+++    [facility] EP Cath Lab Procedure Order   Cardioversion:  Cardioversion  Ordering Provider: Dr Loving  Ordering Date: 6/16/2023  Diagnosis:  AF  Anticipated Case Duration:  Standard  Scheduling Needs/Timeframe:  Urgent-Next available spot  Scheduling Contact: Pt is aware of scheduling limitations at this time due to schedule, please call pt when schedule is available  Cardiology Follow Up Apt s/p: Standard- EP HEAVEN @ 4-6 wks or previously scheduled General Card apt  Current Device/Device Co Needed for Procedure: None NoneNone  Pre-Procedural Testing needed: None  Anesthesia:  General    [facility] EP Cath Lab Prep   H&P:  Compled by cardiology on 6/16/23 if scheduled within 30 days, pt to schedule with PMD if procedure outside of this timeframe  Pre-op Labs: K if pt taking diuretic medication or hx of low Potassium, Beta HcG if appropriate, and INR if on Warfarin will be ordered AM of procedure and Review of most recent labs, WEL for procedure  T&S Pre-Procedure Review: T&S is not required for DCCV/DFT Testing  Medical Records Pertinent for Procedure:  N/A    Allergies   Allergen Reactions     Ace Inhibitors Cough     Empagliflozin Other (See Comments)     weakness     Irbesartan Other (See Comments)     exhaustion     Metformin GI Disturbance     2013.  Retry 2016- gastrointestinal intolerance again     Pioglitazone Other (See Comments)     Hand leg and facial swelling       Current Outpatient Medications:      allopurinol (ZYLOPRIM) 100 MG tablet, [ALLOPURINOL (ZYLOPRIM)  100 MG TABLET] Take 100 mg by mouth 2 (two) times a day.       , Disp: , Rfl:      atorvastatin (LIPITOR) 40 MG tablet, Take 1 tablet by mouth daily, Disp: , Rfl:      BD SUELLEN U/F 32G X 4 MM insulin pen needle, , Disp: , Rfl:      gabapentin (NEURONTIN) 100 MG capsule, 1 po q hs for 3-7 days then 2 po q hs for 3-7 days then 3 po q hs for 3-7 days.  May increase slowly up to 6 tabs at bedtime if needed to control neuropathy in feet. (Patient not taking: Reported on 6/16/2023), Disp: , Rfl:      glipiZIDE (GLUCOTROL) 10 MG tablet, Take 20 mg by mouth daily , Disp: , Rfl:      Lancets (ONETOUCH DELICA PLUS [id number]) MISC, Test once daily varying time,, Disp: , Rfl:      ONETOUCH VERIO IQ test strip, Test 3 times a day., Disp: , Rfl:      potassium chloride (KLOR-CON) 10 MEQ CR tablet, [POTASSIUM CHLORIDE (KLOR-CON) 10 MEQ CR TABLET] Take 20 mEq by mouth 2 (two) times a day., Disp: , Rfl:      pregabalin (LYRICA) 50 MG capsule, Take 50 mg by mouth every evening, Disp: , Rfl:      SOLIQUA pen, Inject 50 Units Subcutaneous every morning (before breakfast), Disp: , Rfl:      sotalol (BETAPACE) 80 MG tablet, Take 1 tablet (80 mg) by mouth 2 times daily, Disp: 180 tablet, Rfl: 3     spironolactone-hydrochlorothiazide (ALDACTAZIDE) 25-25 mg tablet, [SPIRONOLACTONE-HYDROCHLOROTHIAZIDE (ALDACTAZIDE) 25-25 MG TABLET] 1/2 tab, Disp: , Rfl:      warfarin (COUMADIN) 2.5 MG tablet, [WARFARIN (COUMADIN) 2.5 MG TABLET] Adjust dose based on INR results as directed., Disp: , Rfl:     Documentation Date:6/16/2023 2:46 PM  Fiona Hernández RN

## 2023-06-16 NOTE — PATIENT INSTRUCTIONS
[facility]  Cardiac Electrophysiology  [address]  [place] [address]   Office: [phone]  Fax: [phone]       Thank you for seeing us in clinic today - it is a pleasure to be a part of your care team.  Below is a summary of our plan from today's visit.       You have atypical appearing atrial flutter and possibly recurrent atrial fibrillation after prior ablations 3/2011 and 7/2011.  You are presently being managed with sotalol and warfarin.  We reviewed physiology and management options including antiarrhythmic drug therapy and catheter ablation.  We will plan for the following:  - our office will work with you to coordinate a cardioversion  - I will discuss with Dr. Corona to coordinate an atrial flutter ablation  - continue sotalol 80mg twice daily for now  - continue warfarin.  INRs have been therapeutic     Please do not hesitate to be in touch with our office at [phone] with any questions that may arise.       Thank you for trusting us with your care,    Florencio Loving MD  Clinical Cardiac Electrophysiology  [facility]  [address]  [place] [address]   Office: [phone]  Fax: [phone]

## 2023-06-16 NOTE — H&P (VIEW-ONLY)
[facility]  Cardiac Electrophysiology  [address]  [place] [address]   Office: [phone]  Fax: [phone]     Cardiac Electrophysiology Consultation    Patient: Willow Fatima   : 1940     Referring Provider: Rapid Access  Primary Care Provider: Juan Mcmanus    CHIEF COMPLAINT/REASON FOR CONSULTATION  Atypical atrial flutter  Persistent atrial fibrillation - status post cyroablation PVI and CTI ablation 3/2011, redo PVI with LA substrate modification, LA roof line, RSPV-MVA line 2011    Assessment/Recommendations   Willow Fatima is a 83 year old female with persistent atrial fibrillation with prior status post cyroablation PVI and CTI ablation 3/2011, redo PVI with LA substrate modification, LA roof line, RSPV-MVA line 2011; HTN, SHAVON on CPAP referred via [facility] for consultation regarding atypical atrial flutter.    Atypical atrial flutter and persistent atrial fibrillation - status post cyroablation PVI and CTI ablation 3/2011, redo PVI with LA substrate modification, LA roof line, RSPV-MVA line 2011.  Predominant recurrent arrhtyhmia is atypical atrial flutter  [id number]  We reviewed atrial fibrillation and flutter physiology and management considerations including managing stroke risk, rate control, cardioversion, antiarrhythmic drug therapy, and catheter ablation.  We discussed atrial fibrillation and flutter ablation procedures, anticipated success rates, the potential need for re-do ablation vs addition of anti-arrhythmic drugs, procedural risks (including groin bleeding, tamponade, phrenic or esophageal injury, stroke, pulmonary vein stenosis) and recovery expectations.  She would prefer DCCV and atrial flutter ablation  - DCCV early next week  - atypical AFl mapping/ablation, assessment of PVI, prior roof line, RSPV-MV line, general anesthesia, continue warfarin, hold sotalol 3 days prior (likely resume for 6-12 weeks post ablation) - to  "be coordinated on Dr. Corona's schedule  - continue sotalol 80mg twice daily for now  - continue warfarin.  INRs have been therapeutic  - we discussed the ongoing importance of lifestyle modification (maintaining a healthy weight, sleep apnea diagnosis and management, alcohol avoidance) as part of a long term strategy for atrial fibrillation management      Follow up: as above         History of Present Illness   Willow Fatima is a 83 year old female with persistent atrial fibrillation with prior status post cyroablation PVI and CTI ablation 3/2011, redo PVI with LA substrate modification, LA roof line, RSPV-MVA line 7/2011; HTN, SHAVON on CPAP referred via [facility] for consultation regarding atypical atrial flutter.    Mrs. Fatima had atrial fibrillation and underwent PVI 3/2011, redo PVI 7/2011.  She developed atypical atrial flutter 2013, was started on sotalol and underwent DCCV.  She had recurrent AFl 10/29/2019 and underwent DCCV 11/2019.  She had recurrent AFl 8/28/2021 and underwent DCCV 9/3/2021.  She notes onset of lightheadedness around 6/14/2023.      She denies chest pain or syncope.       Physical Examination  Review of Systems   VITALS: /78 (BP Location: Right arm, Patient Position: Sitting, Cuff Size: Adult Large)   Pulse 104   Resp 16   Ht 1.549 m (5' 1\")   Wt 67.1 kg (148 lb)   BMI 27.96 kg/m    Wt Readings from Last 3 Encounters:   03/23/22 65.8 kg (145 lb)   10/05/21 63 kg (139 lb)   09/03/21 63.2 kg (139 lb 4.8 oz)     CONSTITUTIONAL: well nourished, comfortable, no distress  EYES:  Conjunctivae pink, sclerae clear.    E/N/T:  Oral mucosa pink  RESPIRATORY:  Respiratory effort is normal  CARDIOVASCULAR:  Tachycardic, regular, normal S1 and S2  GASTROINTESTINAL:  Abdomen without masses or tenderness  EXTREMITIES:  No clubbing or cyanosis.    MUSCULOSKELETAL:  Overall grossly normal muscle strength  SKIN:  Overall, skin warm and dry, no lesions.  NEURO/PSYCH:  Oriented x 3 with normal affect.   " Constitutional:  No weight loss or loss of appetite    Eyes:  No difficulty with vision, no double vision, no dry eyes  ENT:  No sore throat, difficulty swallowing; changes in hearing or tinnitus  Cardiovascular: As detailed above  Respiratory:  No cough  Musculoskeletal  No joint pain, muscle aches  Neurologic:  No syncope, lightheadedness, fainting spells   Hematologic: No easy bruising, excessive bleeding tendency   Gastrointestinal:  No jaundice, abdominal pain or abdominal bloating  Genitourinary: No changes in urinary habits, no trouble urinating    Psychiatric: No anxiety or depression      Medical History  Surgical History   Past Medical History:   Diagnosis Date     Atrial fibrillation (H)     [id number] score of 7-on chronic warfarin Previously failed Sotalol and Multaq therapy PVI 3/11 Repeat PVI 7/11  Recurrent A fib, most recent CV Feb 2013 Rx sotalol      Atrial flutter (H)     Atypical flutter with RVR after second PVI in July 2011       Esophageal reflux     Created by Conversion      Essential hypertension           Status post catheter ablation of atrial fibrillation 6/3/2019    PVI 3/11(PVI/Cryo + CTI line) Repeat PVI 7/11 (PVI/RF + CFE + Roof line + GREGORIO line)    Past Surgical History:   Procedure Laterality Date     HC REMOVE TONSILS/ADENOIDS,<11 Y/O      Description: Tonsillectomy With Adenoidectomy;  Recorded: 10/17/2012;     HC REVISE MEDIAN N/CARPAL TUNNEL SURG      Description: Neuroplasty Decompression Median Nerve At Carpal Tunnel;  Recorded: 10/17/2012;     ID ABLATE HEART DYSRHYTHM FOCUS      Description: Catheter Ablation Atrial Fibrillation;  Recorded: 06/20/2013;  Comments: PVI 3-7-2011 (PVI/cryo + CTI line); ; Repeat PVI July 2011 (PVI/RF + CFE + Roof line + GREGORIO line)     ID ABLATE HEART DYSRHYTHM FOCUS      Description: Catheter Ablation Atrial Fibrillation;  Recorded: 08/05/2014;  Comments: PVI 3-7-2011 (PVI/cryo + CTI line); ; Repeat PVI July 2011 (PVI/RF + CFE + Roof line + GREGORIO  line)     CO CARDIOVERSION ELECTIVE ARRHYTHMIA EXTERNAL       Fib 10/17/12; ; Flutter 2/15/13; AFl 2019         Family History Social History   Family History   Problem Relation Age of Onset     Aneurysm Mother         brain     Emphysema Father      Lung Cancer Sister      Hypertension Brother      Lung Cancer Sister      Lung Cancer Sister      Breast Cancer Sister         Social History     Tobacco Use     Smoking status: Former     Types: Cigarettes     Quit date: 1962     Years since quittin.2     Smokeless tobacco: Never   Substance Use Topics     Alcohol use: Yes     Comment: Alcoholic Drinks/day: 6 per month     Drug use: No         Medications  Allergies     Current Outpatient Medications:      allopurinol (ZYLOPRIM) 100 MG tablet, [ALLOPURINOL (ZYLOPRIM) 100 MG TABLET] Take 100 mg by mouth 2 (two) times a day.       , Disp: , Rfl:      atorvastatin (LIPITOR) 40 MG tablet, Take 1 tablet by mouth daily, Disp: , Rfl:      gabapentin (NEURONTIN) 100 MG capsule, 1 po q hs for 3-7 days then 2 po q hs for 3-7 days then 3 po q hs for 3-7 days.  May increase slowly up to 6 tabs at bedtime if needed to control neuropathy in feet., Disp: , Rfl:      glipiZIDE (GLUCOTROL) 10 MG tablet, Take 20 mg by mouth daily , Disp: , Rfl:      Lancets (ONETOUCH DELICA PLUS [id number]) MISC, Test once daily varying time,, Disp: , Rfl:      ONETOUCH VERIO IQ test strip, Test 3 times a day., Disp: , Rfl:      potassium chloride (KLOR-CON) 10 MEQ CR tablet, [POTASSIUM CHLORIDE (KLOR-CON) 10 MEQ CR TABLET] Take 20 mEq by mouth 2 (two) times a day., Disp: , Rfl:      sotalol (BETAPACE) 80 MG tablet, Take 1 tablet (80 mg) by mouth 2 times daily, Disp: 180 tablet, Rfl: 3     spironolactone-hydrochlorothiazide (ALDACTAZIDE) 25-25 mg tablet, [SPIRONOLACTONE-HYDROCHLOROTHIAZIDE (ALDACTAZIDE) 25-25 MG TABLET] 1/2 tab, Disp: , Rfl:      warfarin (COUMADIN) 2.5 MG tablet, [WARFARIN (COUMADIN) 2.5 MG TABLET] Adjust dose based on  INR results as directed., Disp: , Rfl:      Allergies   Allergen Reactions     Ace Inhibitors Cough     Empagliflozin Other (See Comments)     weakness     Irbesartan Other (See Comments)     exhaustion     Metformin GI Disturbance     2013.  Retry 2016- gastrointestinal intolerance again     Pioglitazone Other (See Comments)     Hand leg and facial swelling          Lab Results    Chemistry CBC Cardiac Enzymes/BNP/TSH/INR   Recent Labs   Lab Test 09/03/21  0917      POTASSIUM 4.6   CHLORIDE 105   CO2 19*   *   BUN 20   CR 1.00   GFRESTIMATED 53*   HOLLIS 9.9     Recent Labs   Lab Test 09/03/21  0917   CR 1.00          No results for input(s): WBC, HGB, HCT, MCV, PLT in the last 65461 hours.  No results for input(s): HGB in the last 68580 hours. No results for input(s): TROPONINI in the last 43769 hours.  No results for input(s): BNP, NTBNPI, NTBNP in the last 38653 hours.  No results for input(s): TSH in the last 51125 hours.  No results for input(s): INR in the last 82119 hours.      Data Review    ECGs (tracings independently reviewed)  6/16/2023 - typical appearing AFl, ventricular rate 105bpm  9/3/2021 (post DCCV) - SR 61bpm, NSST/T changes, QT/QTc 472/475ms  9/1/2021 - atypical appearing AFl, ventricular rate 110bpm  11/4/2019 - atypical appearing AFl, ventricular rate 117bpm       Cc: Timur Loving MD  6/16/2023  2:15 PM

## 2023-06-19 LAB
ATRIAL RATE - MUSE: 105 BPM
DIASTOLIC BLOOD PRESSURE - MUSE: NORMAL MMHG
INTERPRETATION ECG - MUSE: NORMAL
P AXIS - MUSE: NORMAL DEGREES
PR INTERVAL - MUSE: NORMAL MS
QRS DURATION - MUSE: 84 MS
QT - MUSE: 370 MS
QTC - MUSE: 489 MS
R AXIS - MUSE: 29 DEGREES
SYSTOLIC BLOOD PRESSURE - MUSE: NORMAL MMHG
T AXIS - MUSE: 45 DEGREES
VENTRICULAR RATE- MUSE: 105 BPM

## 2023-06-21 ENCOUNTER — ANESTHESIA EVENT (OUTPATIENT)
Dept: CARDIOLOGY | Facility: HOSPITAL | Age: 83
End: 2023-06-21
Payer: COMMERCIAL

## 2023-06-21 ENCOUNTER — ANESTHESIA (OUTPATIENT)
Dept: CARDIOLOGY | Facility: HOSPITAL | Age: 83
End: 2023-06-21
Payer: COMMERCIAL

## 2023-06-21 ENCOUNTER — HOSPITAL ENCOUNTER (OUTPATIENT)
Dept: CARDIOLOGY | Facility: HOSPITAL | Age: 83
Discharge: HOME | End: 2023-06-21
Attending: INTERNAL MEDICINE | Admitting: NURSE PRACTITIONER
Payer: COMMERCIAL

## 2023-06-21 VITALS
SYSTOLIC BLOOD PRESSURE: 127 MMHG | RESPIRATION RATE: 26 BRPM | TEMPERATURE: 97.6 F | HEIGHT: 61 IN | WEIGHT: 148 LBS | DIASTOLIC BLOOD PRESSURE: 61 MMHG | BODY MASS INDEX: 27.94 KG/M2 | HEART RATE: 63 BPM | OXYGEN SATURATION: 95 %

## 2023-06-21 DIAGNOSIS — I48.19 PERSISTENT ATRIAL FIBRILLATION (H): ICD-10-CM

## 2023-06-21 LAB — INR POINT OF CARE: 2.9 (ref 0.9–1.1)

## 2023-06-21 PROCEDURE — 999N000054 HC STATISTIC EKG NON-CHARGEABLE

## 2023-06-21 PROCEDURE — 85610 PROTHROMBIN TIME: CPT | Performed by: INTERNAL MEDICINE

## 2023-06-21 PROCEDURE — 92960 CARDIOVERSION ELECTRIC EXT: CPT

## 2023-06-21 PROCEDURE — 250N000009 HC RX 250: Performed by: NURSE ANESTHETIST, CERTIFIED REGISTERED

## 2023-06-21 PROCEDURE — 93005 ELECTROCARDIOGRAM TRACING: CPT

## 2023-06-21 PROCEDURE — 92960 CARDIOVERSION ELECTRIC EXT: CPT | Performed by: NURSE PRACTITIONER

## 2023-06-21 PROCEDURE — 370N000017 HC ANESTHESIA TECHNICAL FEE, PER MIN

## 2023-06-21 PROCEDURE — 93010 ELECTROCARDIOGRAM REPORT: CPT | Performed by: INTERNAL MEDICINE

## 2023-06-21 RX ORDER — OXYCODONE HYDROCHLORIDE 5 MG/1
5 TABLET ORAL
Status: CANCELLED | OUTPATIENT
Start: 2023-06-21

## 2023-06-21 RX ORDER — OXYCODONE HYDROCHLORIDE 5 MG/1
10 TABLET ORAL
Status: CANCELLED | OUTPATIENT
Start: 2023-06-21

## 2023-06-21 RX ORDER — LIDOCAINE 40 MG/G
CREAM TOPICAL
Status: DISCONTINUED | OUTPATIENT
Start: 2023-06-21 | End: 2023-06-21 | Stop reason: HOSPADM

## 2023-06-21 RX ORDER — ONDANSETRON 2 MG/ML
4 INJECTION INTRAMUSCULAR; INTRAVENOUS EVERY 30 MIN PRN
Status: CANCELLED | OUTPATIENT
Start: 2023-06-21

## 2023-06-21 RX ORDER — ONDANSETRON 4 MG/1
4 TABLET, ORALLY DISINTEGRATING ORAL EVERY 30 MIN PRN
Status: CANCELLED | OUTPATIENT
Start: 2023-06-21

## 2023-06-21 RX ADMIN — METHOHEXITAL SODIUM 70 MG: 500 INJECTION, POWDER, LYOPHILIZED, FOR SOLUTION INTRAMUSCULAR; INTRAVENOUS; RECTAL at 09:45

## 2023-06-21 ASSESSMENT — ACTIVITIES OF DAILY LIVING (ADL): ADLS_ACUITY_SCORE: 35

## 2023-06-21 ASSESSMENT — ENCOUNTER SYMPTOMS: DYSRHYTHMIAS: 1

## 2023-06-21 NOTE — PROCEDURES
[facility]    Procedure: Cardioversion    Date/Time: 6/21/2023 10:09 AM    Performed by: Charisma Freeman APRN CNP  Authorized by: Florencio Loving MD      UNIVERSAL PROTOCOL   Site Marked: NA  Prior Images Obtained and Reviewed:  Yes  Required items: Required blood products, implants, devices and special equipment available    Patient identity confirmed:  Verbally with patient, arm band and provided demographic data  Patient was reevaluated immediately before administering moderate or deep sedation or anesthesia  Confirmation Checklist:  Patient's identity using two indicators, relevant allergies, procedure was appropriate and matched the consent or emergent situation and correct equipment/implants were available  Time out: Immediately prior to the procedure a time out was called    Universal Protocol: the Joint Commission Universal Protocol was followed       ANESTHESIA  Anesthesia was administered and monitored by anesthesiology.  See anesthesia documentation for details.    PROCEDURE DETAILS  Pre-procedure rhythm: atrial flutter  Patient position: patient was placed in a supine position  Chest area: chest area exposed  Electrodes: pads  Electrodes placed: anterior-posterior  Number of attempts: 1    Details of Attempts:  At 0946, after administration of IV Bevital by MDA and confirmation of adequate sedation, she received a single synchronized shock at 50 J with prompt restoration of sinus rhythm in the 50s to 60s.  Post cardioversion EKG was personally reviewed, show sinus bradycardia at 50 bpm with sinus arrhythmia,  ms, QT/QTc 488/444 ms.  Post-procedure rhythm: normal sinus rhythm  Complications: no complications      PROCEDURE  Describe Procedure: Long history of persistent atrial fibrillation and flutter with 2 previous ablations in 2011 with subsequent recurrence of atypical atrial flutter requiring cardioversion in 2019 and September 2021.  She recently had recurrence  again associated with sudden onset of significant symptoms.  She remains on sotalol 80 mg twice daily, unchanged as last episode of AF was almost 5 age > 75, female gender, hypertension, and diabetes.  She is on warfarin for stroke prophylaxis, INRs consistently >2.0.  Successful cardioversion to sinus rhythm  Continue sotalol 80 mg twice daily  Continue warfarin for stoke prophylaxis  Follow up with Rajani Cruz CNP on 7/19/2023  Discharge to home 1 hour after cardioversion as she is fully awake and stable  Patient Tolerance:  Patient tolerated the procedure well with no immediate complications  Length of time physician/provider present for 1:1 monitoring during sedation: 10

## 2023-06-21 NOTE — PLAN OF CARE
Goal Outcome Evaluation:               Pt admitted for cardioversion due to atrial flutter. Pt converted to sinus jazmyne after 50J shock. Pt awake and eating breakfast.  Jesus at bedside. Discharge instructions given. Will discharge home with .      Yamel Muhammad RN

## 2023-06-21 NOTE — INTERVAL H&P NOTE
"I have reviewed the surgical (or preoperative) H&P that is linked to this encounter, and examined the patient. There are no significant changes.  INRs reviewed.    Clinical Conditions Present on Arrival:  Clinically Significant Risk Factors Present on Admission                # Drug Induced Coagulation Defect: home medication list includes an anticoagulant medication   # Overweight: Estimated body mass index is 27.96 kg/m  as calculated from the following:    Height as of this encounter: 1.549 m (5' 1\").    Weight as of this encounter: 67.1 kg (148 lb).       "

## 2023-06-21 NOTE — ANESTHESIA PREPROCEDURE EVALUATION
Anesthesia Pre-Procedure Evaluation    Patient: Willow Fatima   MRN: [id number] : 1940        Procedure : * No procedures listed *  Cardioversion External       Past Medical History:   Diagnosis Date     Atrial fibrillation (H)     [id number] score of 7-on chronic warfarin Previously failed Sotalol and Multaq therapy PVI 3/11 Repeat PVI   Recurrent A fib, most recent CV 2013 Rx sotalol      Atrial flutter (H)     Atypical flutter with RVR after second PVI in 2011       Esophageal reflux     Created by Conversion      Essential hypertension           Status post catheter ablation of atrial fibrillation 6/3/2019    PVI 3/11(PVI/Cryo + CTI line) Repeat PVI  (PVI/RF + CFE + Roof line + GREGORIO line)      Past Surgical History:   Procedure Laterality Date     HC REMOVE TONSILS/ADENOIDS,<11 Y/O      Description: Tonsillectomy With Adenoidectomy;  Recorded: 10/17/2012;     HC REVISE MEDIAN N/CARPAL TUNNEL SURG      Description: Neuroplasty Decompression Median Nerve At Carpal Tunnel;  Recorded: 10/17/2012;     IN ABLATE HEART DYSRHYTHM FOCUS      Description: Catheter Ablation Atrial Fibrillation;  Recorded: 2013;  Comments: PVI 3-7-2011 (PVI/cryo + CTI line); ; Repeat PVI 2011 (PVI/RF + CFE + Roof line + GREGORIO line)     IN ABLATE HEART DYSRHYTHM FOCUS      Description: Catheter Ablation Atrial Fibrillation;  Recorded: 2014;  Comments: PVI 3-7-2011 (PVI/cryo + CTI line); ; Repeat PVI 2011 (PVI/RF + CFE + Roof line + GREGORIO line)     IN CARDIOVERSION ELECTIVE ARRHYTHMIA EXTERNAL       Fib 10/17/12; ; Flutter 2/15/13; AFl 2019      Allergies   Allergen Reactions     Ace Inhibitors Cough     Empagliflozin Other (See Comments)     weakness     Irbesartan Other (See Comments)     exhaustion     Metformin GI Disturbance     .  Retry 2016- gastrointestinal intolerance again     Pioglitazone Other (See Comments)     Hand leg and facial swelling      Social History     Tobacco Use      Smoking status: Former     Types: Cigarettes     Quit date: 1962     Years since quittin.2     Smokeless tobacco: Never   Substance Use Topics     Alcohol use: Yes     Comment: Alcoholic Drinks/day: 6 per month      Wt Readings from Last 1 Encounters:   23 67.1 kg (148 lb)        Anesthesia Evaluation   Pt has had prior anesthetic. Type: General.        ROS/MED HX  ENT/Pulmonary:     (+) sleep apnea, uses CPAP,     Neurologic:       Cardiovascular:     (+) hypertension-----dysrhythmias, a-fib,     METS/Exercise Tolerance:     Hematologic:       Musculoskeletal:       GI/Hepatic:     (+) GERD,     Renal/Genitourinary:       Endo:     (+) type II DM, Obesity,     Psychiatric/Substance Use:       Infectious Disease:       Malignancy:       Other:            Physical Exam    Airway  airway exam normal      Mallampati: II   TM distance: > 3 FB   Neck ROM: full   Mouth opening: > 3 cm    Respiratory Devices and Support         Dental       (+) Multiple crowns, permanant bridges      Cardiovascular          Rhythm and rate: irregular and normal     Pulmonary   pulmonary exam normal        breath sounds clear to auscultation           OUTSIDE LABS:  CBC: No results found for: WBC, HGB, HCT, PLT  BMP:   Lab Results   Component Value Date     2023     2021    POTASSIUM 4.1 2023    POTASSIUM 4.6 2021    CHLORIDE 99 2023    CHLORIDE 105 2021    CO2 22 2023    CO2 19 (L) 2021    BUN 18.5 2023    BUN 20 2021    CR 1.08 (H) 2023    CR 1.00 2021     (H) 2023     (H) 2021     COAGS:   Lab Results   Component Value Date    INR 2.9 (A) 2023     POC: No results found for: BGM, HCG, HCGS  HEPATIC: No results found for: ALBUMIN, PROTTOTAL, ALT, AST, GGT, ALKPHOS, BILITOTAL, BILIDIRECT, ABIMAEL  OTHER:   Lab Results   Component Value Date    HOLLIS 9.7 2023       Anesthesia Plan    ASA Status:  3       Anesthesia Type: General.     - Airway: Mask Only      Maintenance: TIVA.        Consents    Anesthesia Plan(s) and associated risks, benefits, and realistic alternatives discussed. Questions answered and patient/representative(s) expressed understanding.    - Discussed:     - Discussed with:  Patient, Spouse         Postoperative Care       PONV prophylaxis: Ondansetron (or other 5HT-3)     Comments:                Mendel Mosley MD

## 2023-06-21 NOTE — ANESTHESIA POSTPROCEDURE EVALUATION
Patient: Willow Fatima    Procedure: * No procedures listed *  Cardioversion External    Anesthesia Type:  General    Note:  Disposition: Outpatient   Postop Pain Control: Uneventful            Sign Out: Well controlled pain   PONV: No   Neuro/Psych: Uneventful            Sign Out: Acceptable/Baseline neuro status   Airway/Respiratory: Uneventful            Sign Out: Acceptable/Baseline resp. status   CV/Hemodynamics: Uneventful            Sign Out: Acceptable CV status; No obvious hypovolemia; No obvious fluid overload   Other NRE: NONE   DID A NON-ROUTINE EVENT OCCUR? No           Last vitals:  Vitals:    06/21/23 1000 06/21/23 1015 06/21/23 1030   BP: 114/55 114/62 127/61   Pulse: 57 60 63   Resp: 20 25 26   Temp:      SpO2: 98% 95%        Electronically Signed By: Mendel Mosley MD  June 21, 2023  2:28 PM

## 2023-06-21 NOTE — ANESTHESIA CARE TRANSFER NOTE
Patient: Willow MARTIN Orff    Procedure: * No procedures listed *  Cardioversion External    Diagnosis: * No pre-op diagnosis entered *  Diagnosis Additional Information: No value filed.    Anesthesia Type:   No value filed.     Note:    Oropharynx: oropharynx clear of all foreign objects and spontaneously breathing  Level of Consciousness: drowsy  Oxygen Supplementation: nasal cannula  Level of Supplemental Oxygen (L/min / FiO2): 5  Independent Airway: airway patency satisfactory and stable  Dentition: dentition unchanged  Vital Signs Stable: post-procedure vital signs reviewed and stable  Report to RN Given: handoff report given  Patient transferred to: Cardiac Special Care          Vitals:  Vitals Value Taken Time   /72 06/21/23 0947   Temp     Pulse 59 06/21/23 0947   Resp 16 06/21/23 0947   SpO2 99 % 06/21/23 0947       Electronically Signed By: TEODORO Harrington CRNA  June 21, 2023  9:48 AM

## 2023-06-23 LAB
ATRIAL RATE - MUSE: 60 BPM
DIASTOLIC BLOOD PRESSURE - MUSE: NORMAL MMHG
INTERPRETATION ECG - MUSE: NORMAL
P AXIS - MUSE: 69 DEGREES
PR INTERVAL - MUSE: 230 MS
QRS DURATION - MUSE: 100 MS
QT - MUSE: 488 MS
QTC - MUSE: 444 MS
R AXIS - MUSE: 26 DEGREES
SYSTOLIC BLOOD PRESSURE - MUSE: NORMAL MMHG
T AXIS - MUSE: 26 DEGREES
VENTRICULAR RATE- MUSE: 50 BPM

## 2023-06-26 ENCOUNTER — DOCUMENTATION ONLY (OUTPATIENT)
Dept: CARDIOLOGY | Facility: CLINIC | Age: 83
End: 2023-06-26
Payer: COMMERCIAL

## 2023-06-26 DIAGNOSIS — I48.19 PERSISTENT ATRIAL FIBRILLATION (H): Primary | ICD-10-CM

## 2023-06-26 NOTE — PROGRESS NOTES
H&P Date: Teach Date: HALI No CT/  MRI Yes- Ordered   MRI   PVI  Order Case Req Y  Order Set [] Lab/EKG  Orders [] Letter [] F/U RN Date:  NP follow-up Date:     AC Coumadin:- CONTINUE     AAD Sotalol-Hold 3 days prior   PPI Start Protonix 40mg Daily 3 days prior, 6wk post DM Yes     1940  Home:[phone] (home) Cell:[phone] (mobile)  Emergency Contact: Jesus Fatima [phone]  PCP: Juan Mcmanus, [phone]     Important patient information for CSC/Cath Lab staff : None     [facility] EP Cath Lab Procedure Order   Ablation Type:  PVI- Atrial Fibrillation Redo  Diagnosis:  AF  Ordering Provider: Dr Corona  Ordering Date: 6/26/2023     Scheduling Information:  Anticipated Case Duration:  Standard ( Case per day SA 2:1, DW 4:1, KA 3:1)   Scheduling Timeframe:  Next Available  Scheduling Restrictions: Will need weekly INRs prior to ablation, ok to schedule on or after 7/26/23  Scheduling Contact: Please contact pt to schedule, if you are unable to schedule date within the next 24 hours please contact pt to update on scheduling process  EP RN Follow Up Apt: Schedule EP RN PC visit 3-4 days s/p PVI  MD Preference: Scheduling with ordering provider  Current Device/Device Co Needed for Procedure: None NoneNone  Pre-Procedural Testing needed: Pulmonary Vein CT/MRI and Echo   Mapping System Required:  MLEITON (Dr Corona)  ICE Needed:  Yes  Anesthesia:General Anesthesia    [facility] EP Cath Lab Prep   H&P:  Schedule H&P with EP HEAVEN, RN Teach, and Labs within 30 days of PVI  Pre-op Labs: CBC, BMP, Beta HcG if appropriate, and INR if on Warfarin will be ordered AM of procedure and Review of most recent labs, WEL for procedure  T&S Pre-Procedure Review: Does not need for PVI procedures  Medical Records Pertinent for Procedure:  CT/MRI Pending, Echo Pending, EKG Pending and Ablation Record 2011  Allergies: Reviewed allergies, no concerns regarding orders for procedure    Allergies   Allergen Reactions     Ace Inhibitors Cough      Empagliflozin Other (See Comments)     weakness     Irbesartan Other (See Comments)     exhaustion     Metformin GI Disturbance     2013.  Retry 2016- gastrointestinal intolerance again     Pioglitazone Other (See Comments)     Hand leg and facial swelling       Current Outpatient Medications:      allopurinol (ZYLOPRIM) 100 MG tablet, [ALLOPURINOL (ZYLOPRIM) 100 MG TABLET] Take 100 mg by mouth 2 (two) times a day.       , Disp: , Rfl:      atorvastatin (LIPITOR) 40 MG tablet, Take 1 tablet by mouth daily, Disp: , Rfl:      BD SUELLEN U/F 32G X 4 MM insulin pen needle, , Disp: , Rfl:      gabapentin (NEURONTIN) 100 MG capsule, , Disp: , Rfl:      glipiZIDE (GLUCOTROL) 10 MG tablet, Take 20 mg by mouth daily , Disp: , Rfl:      Lancets (ONETOUCH DELICA PLUS [id number]) MISC, Test once daily varying time,, Disp: , Rfl:      ONETOUCH VERIO IQ test strip, Test 3 times a day., Disp: , Rfl:      potassium chloride (KLOR-CON) 10 MEQ CR tablet, [POTASSIUM CHLORIDE (KLOR-CON) 10 MEQ CR TABLET] Take 20 mEq by mouth 2 (two) times a day., Disp: , Rfl:      pregabalin (LYRICA) 50 MG capsule, Take 50 mg by mouth every evening, Disp: , Rfl:      SOLIQUA pen, Inject 50 Units Subcutaneous every morning (before breakfast), Disp: , Rfl:      sotalol (BETAPACE) 80 MG tablet, Take 1 tablet (80 mg) by mouth 2 times daily, Disp: 180 tablet, Rfl: 3     spironolactone-hydrochlorothiazide (ALDACTAZIDE) 25-25 mg tablet, [SPIRONOLACTONE-HYDROCHLOROTHIAZIDE (ALDACTAZIDE) 25-25 MG TABLET] 1/2 tab, Disp: , Rfl:      warfarin (COUMADIN) 2.5 MG tablet, [WARFARIN (COUMADIN) 2.5 MG TABLET] Adjust dose based on INR results as directed., Disp: , Rfl:     Documentation Date:6/26/2023 12:48 PM  Fiona Hernández RN

## 2023-07-18 NOTE — PROGRESS NOTES
[facility] HEART CARE [address], [place] [address]   [email or web address]   OFFICE: [phone]       Primary Care: Juan Mcmanus    Assessment/Recommendations     Paroxysmal atrial fibrillation, atrial flutter: history of past ablations in March 2011, including PVI (cryo) and CTI line, and July 2011 including PVI (RF), CFE, roofline and GREGORIO line.  Recurrences requiring DCCV 2019 and 2021.  Symptomatic with general malaise with atrial flutter  -Plan for atypical atrial flutter ablation and assessment of prior targeted substrates  -Pending echocardiogram and PV CT prior to repeat ablation  -Continue sotalol 80 mg twice daily, consider renal dosing subsequent to ablation due to CrCl 45    [id number] score of 5 for age >75, female gender, hypertension and type 2 diabetes  -Continue warfarin as ordered for stroke prophylaxis, INRs largely therapeutic    Essential hypertension: well managed on current regimen    SHAVON: consistent use of CPAP    CKD IIIa    Follow up: prior to catheter ablation     History of Present Illness/Subjective    Willow Fatima is a 83 year old female, seen today for follow up of atrial fibrillation. She has a past medical history significant for essential hypertension, SHAVON, type 2 diabetes.    She underwent catheter ablation in March 2011, including PVI (cryo) and CTI line, with repeat ablation July 2011 including PVI (RF), CFE, roofline and GREGORIO line.  She developed atypical atrial flutter in 2013 and underwent DCCV with prior sotalol initiation.  She has had multiple recurrences since that time requiring cardioversion in 2019 and 2021.  More recently June 2023 she noted lightheadedness, with EKG documentation of atypical atrial flutter with modestly elevated ventricular response.  She underwent DCCV 6/21/2023.  She is seen today for follow-up.    She has had no recurrent symptoms of arrhythmia since cardioversion.  She feels weak when in  "atrial fibrillation; symptoms in atrial flutter include general malaise.  She denies palpitations, chest discomfort, shortness of breath, lightheadedness/dizziness, pedal edema or orthopnea.       Data Review     EK2023: Sinus rhythm 50 bpm, brief second-degree AVB type II,  ms, QT/QTc 488/444 ms  2023: Atypical atrial flutter 105 bpm, QRS 84 ms, QT/QTc 370/489 ms  Personally reviewed.     TTE: pending    I have reviewed and updated the patient's past medical history, allergy list and medication list.          Physical Examination Review of Systems   Vitals: /68 (BP Location: Right arm, Patient Position: Sitting, Cuff Size: Adult Regular)   Pulse 67   Resp 16   Ht 1.549 m (5' 1\")   Wt 66.2 kg (146 lb)   BMI 27.59 kg/m      BMI= Body mass index is 27.59 kg/m .    Wt Readings from Last 3 Encounters:   23 66.2 kg (146 lb)   23 67.1 kg (148 lb)   23 67.1 kg (148 lb)       General   Appearance:   Alert and oriented, in no acute distress.    HEENT:  Normocephalic and atraumatic. Conjunctiva and sclera are clear. Moist oral mucosa.    Neck: No JVP, carotid bruit or obvious thyromegaly.   Lungs:   Respirations unlabored. Clear bilaterally with no rales, rhonchi, or wheezes.     Cardiovascular:   Rhythm is regular. S1 and S2 are normal. No significant murmur is present. Lower extremities demonstrate no significant edema. Posterior tibial pulses are intact bilaterally.   Extremities: No cyanosis or clubbing   Skin: Skin is warm, dry, and otherwise intact.   Neurologic: Gait not assessed. Mood and affect appropriate.    A 12 point comprehensive review of systems was  negative except as noted.      Medical History  Surgical History Family History Social History   Past Medical History:   Diagnosis Date     Atrial fibrillation (H)     [id number] score of 7-on chronic warfarin Previously failed Sotalol and Multaq therapy PVI 3/11 Repeat PVI   Recurrent A fib, most recent CV " 2013 Rx sotalol      Atrial flutter (H)     Atypical flutter with RVR after second PVI in 2011       Esophageal reflux     Created by Conversion      Essential hypertension           Status post catheter ablation of atrial fibrillation 6/3/2019    PVI 3/11(PVI/Cryo + CTI line) Repeat PVI  (PVI/RF + CFE + Roof line + GREGORIO line)    Past Surgical History:   Procedure Laterality Date     HC REMOVE TONSILS/ADENOIDS,<13 Y/O      Description: Tonsillectomy With Adenoidectomy;  Recorded: 10/17/2012;     HC REVISE MEDIAN N/CARPAL TUNNEL SURG      Description: Neuroplasty Decompression Median Nerve At Carpal Tunnel;  Recorded: 10/17/2012;     DE ABLATE HEART DYSRHYTHM FOCUS      Description: Catheter Ablation Atrial Fibrillation;  Recorded: 2013;  Comments: PVI 3-7-2011 (PVI/cryo + CTI line); ; Repeat PVI 2011 (PVI/RF + CFE + Roof line + GREGORIO line)     DE ABLATE HEART DYSRHYTHM FOCUS      Description: Catheter Ablation Atrial Fibrillation;  Recorded: 2014;  Comments: PVI 3-7-2011 (PVI/cryo + CTI line); ; Repeat PVI 2011 (PVI/RF + CFE + Roof line + GREGORIO line)     DE CARDIOVERSION ELECTIVE ARRHYTHMIA EXTERNAL       Fib 10/17/12; ; Flutter 2/15/13; AFl 2019    Family History   Problem Relation Age of Onset     Aneurysm Mother         brain     Emphysema Father      Lung Cancer Sister      Hypertension Brother      Lung Cancer Sister      Lung Cancer Sister      Breast Cancer Sister     Social History     Socioeconomic History     Marital status:      Spouse name: Not on file     Number of children: 3     Years of education: Not on file     Highest education level: Not on file   Occupational History     Not on file   Tobacco Use     Smoking status: Former     Types: Cigarettes     Quit date: 1962     Years since quittin.3     Smokeless tobacco: Never   Substance and Sexual Activity     Alcohol use: Yes     Comment: Alcoholic Drinks/day: 6 per month     Drug use: No     Sexual  activity: Yes   Other Topics Concern     Not on file   Social History Narrative     Not on file     Social Determinants of Health     Financial Resource Strain: Not on file   Food Insecurity: Not on file   Transportation Needs: Not on file   Physical Activity: Not on file   Stress: Not on file   Social Connections: Not on file   Intimate Partner Violence: Not on file   Housing Stability: Not on file          Medications  Allergies   Scheduled Meds:  Current Outpatient Medications   Medication Sig Dispense Refill     allopurinol (ZYLOPRIM) 100 MG tablet [ALLOPURINOL (ZYLOPRIM) 100 MG TABLET] Take 100 mg by mouth 2 (two) times a day.              atorvastatin (LIPITOR) 40 MG tablet Take 1 tablet by mouth daily       BD SUELLEN U/F 32G X 4 MM insulin pen needle        glipiZIDE (GLUCOTROL) 10 MG tablet Take 20 mg by mouth daily        Lancets (ONETOUCH DELICA PLUS [id number]) MISC Test once daily varying time,       ONETOUCH VERIO IQ test strip Test 3 times a day.       potassium chloride (KLOR-CON) 10 MEQ CR tablet [POTASSIUM CHLORIDE (KLOR-CON) 10 MEQ CR TABLET] Take 20 mEq by mouth 2 (two) times a day.       SOLIQUA pen Inject 50 Units Subcutaneous every morning (before breakfast)       sotalol (BETAPACE) 80 MG tablet Take 1 tablet (80 mg) by mouth 2 times daily 180 tablet 3     spironolactone-hydrochlorothiazide (ALDACTAZIDE) 25-25 mg tablet [SPIRONOLACTONE-HYDROCHLOROTHIAZIDE (ALDACTAZIDE) 25-25 MG TABLET] 1/2 tab       warfarin (COUMADIN) 2.5 MG tablet [WARFARIN (COUMADIN) 2.5 MG TABLET] Adjust dose based on INR results as directed.       gabapentin (NEURONTIN) 100 MG capsule  (Patient not taking: Reported on 7/19/2023)       pregabalin (LYRICA) 50 MG capsule Take 50 mg by mouth every evening      Allergies   Allergen Reactions     Ace Inhibitors Cough     Empagliflozin Other (See Comments)     weakness     Irbesartan Other (See Comments)     exhaustion     Metformin GI Disturbance     2013.  Retry 2016-  gastrointestinal intolerance again     Pioglitazone Other (See Comments)     Hand leg and facial swelling         Lab Results    Chemistry/lipid CBC Cardiac Enzymes/BNP/TSH/INR   Lab Results   Component Value Date    BUN 18.5 06/16/2023     06/16/2023    CO2 22 06/16/2023    No results found for: WBC, HGB, HCT, MCV, PLT @RESUFAST(BMP,CBC,BNP,TSH,  INR)@      15 minutes spent reviewing prior records (including documentation, laboratory studies, cardiac testing/imaging), history and physical exam, planning, and subsequent documentation.     This note has been dictated using voice recognition software. Any grammatical, typographical, or context distortions are unintentional and inherent to the software.    Rajani Cruz, CNP  Clinical Cardiac Electrophysiology  [address]  [place] [address]   Office: [phone]  Fax: [phone]

## 2023-07-19 ENCOUNTER — OFFICE VISIT (OUTPATIENT)
Dept: CARDIOLOGY | Facility: CLINIC | Age: 83
End: 2023-07-19
Payer: COMMERCIAL

## 2023-07-19 VITALS
HEART RATE: 67 BPM | BODY MASS INDEX: 27.56 KG/M2 | RESPIRATION RATE: 16 BRPM | SYSTOLIC BLOOD PRESSURE: 124 MMHG | HEIGHT: 61 IN | DIASTOLIC BLOOD PRESSURE: 68 MMHG | WEIGHT: 146 LBS

## 2023-07-19 DIAGNOSIS — I48.4 ATYPICAL ATRIAL FLUTTER (H): ICD-10-CM

## 2023-07-19 DIAGNOSIS — Z98.890 STATUS POST CATHETER ABLATION OF ATRIAL FIBRILLATION: ICD-10-CM

## 2023-07-19 DIAGNOSIS — G47.33 OSA ON CPAP: ICD-10-CM

## 2023-07-19 DIAGNOSIS — I48.19 PERSISTENT ATRIAL FIBRILLATION (H): Primary | ICD-10-CM

## 2023-07-19 DIAGNOSIS — I10 ESSENTIAL HYPERTENSION: ICD-10-CM

## 2023-07-19 DIAGNOSIS — N18.31 CHRONIC KIDNEY DISEASE, STAGE 3A (H): ICD-10-CM

## 2023-07-19 PROCEDURE — 99214 OFFICE O/P EST MOD 30 MIN: CPT | Performed by: NURSE PRACTITIONER

## 2023-07-19 NOTE — PATIENT INSTRUCTIONS
Willow Fatima,    It was a pleasure to see you today at the [facility].     My recommendations after this visit include:  -Schedule pulmonary vein CT and echocardiogram.  -We will continue to proceed with scheduling ablation.     Please do not hesitate to call with additional questions or concerns.     Rajani Cruz, CNP  Clinical Cardiac Electrophysiology  [facility] and Scheduling [phone]  Electrophysiology Nurses [phone]

## 2023-07-19 NOTE — LETTER
7/19/2023    DESHAUN YBARRA  [address]  [facility] [address]    RE: Willow Fatima       Dear Colleague,     I had the pleasure of seeing Willow Fatima in the [facility].    [facility] HEART CARE   [address], [place] [address]   [email or web address]   OFFICE: [phone]       Primary Care: Deshaun Ybarra    Assessment/Recommendations     Paroxysmal atrial fibrillation, atrial flutter: history of past ablations in March 2011, including PVI (cryo) and CTI line, and July 2011 including PVI (RF), CFE, roofline and GREGORIO line.  Recurrences requiring DCCV 2019 and 2021.  Symptomatic with general malaise with atrial flutter  -Plan for atypical atrial flutter ablation and assessment of prior targeted substrates  -Pending echocardiogram and PV CT prior to repeat ablation  -Continue sotalol 80 mg twice daily, consider renal dosing subsequent to ablation due to CrCl 45    [id number] score of 5 for age >75, female gender, hypertension and type 2 diabetes  -Continue warfarin as ordered for stroke prophylaxis, INRs largely therapeutic    Essential hypertension: well managed on current regimen    SHAVON: consistent use of CPAP    CKD IIIa    Follow up: prior to catheter ablation     History of Present Illness/Subjective    Willow Fatima is a 83 year old female, seen today for follow up of atrial fibrillation. She has a past medical history significant for essential hypertension, SHAVON, type 2 diabetes.    She underwent catheter ablation in March 2011, including PVI (cryo) and CTI line, with repeat ablation July 2011 including PVI (RF), CFE, roofline and GREGORIO line.  She developed atypical atrial flutter in 2013 and underwent DCCV with prior sotalol initiation.  She has had multiple recurrences since that time requiring cardioversion in 2019 and 2021.  More recently June 2023 she noted lightheadedness, with EKG documentation of atypical atrial flutter  "with modestly elevated ventricular response.  She underwent DCCV 2023.  She is seen today for follow-up.    She has had no recurrent symptoms of arrhythmia since cardioversion.  She feels weak when in atrial fibrillation; symptoms in atrial flutter include general malaise.  She denies palpitations, chest discomfort, shortness of breath, lightheadedness/dizziness, pedal edema or orthopnea.       Data Review     EK2023: Sinus rhythm 50 bpm, brief second-degree AVB type II,  ms, QT/QTc 488/444 ms  2023: Atypical atrial flutter 105 bpm, QRS 84 ms, QT/QTc 370/489 ms  Personally reviewed.     TTE: pending    I have reviewed and updated the patient's past medical history, allergy list and medication list.          Physical Examination Review of Systems   Vitals: /68 (BP Location: Right arm, Patient Position: Sitting, Cuff Size: Adult Regular)   Pulse 67   Resp 16   Ht 1.549 m (5' 1\")   Wt 66.2 kg (146 lb)   BMI 27.59 kg/m      BMI= Body mass index is 27.59 kg/m .    Wt Readings from Last 3 Encounters:   23 66.2 kg (146 lb)   23 67.1 kg (148 lb)   23 67.1 kg (148 lb)       General   Appearance:   Alert and oriented, in no acute distress.    HEENT:  Normocephalic and atraumatic. Conjunctiva and sclera are clear. Moist oral mucosa.    Neck: No JVP, carotid bruit or obvious thyromegaly.   Lungs:   Respirations unlabored. Clear bilaterally with no rales, rhonchi, or wheezes.     Cardiovascular:   Rhythm is regular. S1 and S2 are normal. No significant murmur is present. Lower extremities demonstrate no significant edema. Posterior tibial pulses are intact bilaterally.   Extremities: No cyanosis or clubbing   Skin: Skin is warm, dry, and otherwise intact.   Neurologic: Gait not assessed. Mood and affect appropriate.    A 12 point comprehensive review of systems was  negative except as noted.      Medical History  Surgical History Family History Social History   Past Medical " History:   Diagnosis Date    Atrial fibrillation (H)     [id number] score of 7-on chronic warfarin Previously failed Sotalol and Multaq therapy PVI 3/11 Repeat PVI 7/11  Recurrent A fib, most recent CV Feb 2013 Rx sotalol     Atrial flutter (H)     Atypical flutter with RVR after second PVI in July 2011      Esophageal reflux     Created by Conversion     Essential hypertension          Status post catheter ablation of atrial fibrillation 6/3/2019    PVI 3/11(PVI/Cryo + CTI line) Repeat PVI 7/11 (PVI/RF + CFE + Roof line + GREGORIO line)    Past Surgical History:   Procedure Laterality Date    HC REMOVE TONSILS/ADENOIDS,<11 Y/O      Description: Tonsillectomy With Adenoidectomy;  Recorded: 10/17/2012;    HC REVISE MEDIAN N/CARPAL TUNNEL SURG      Description: Neuroplasty Decompression Median Nerve At Carpal Tunnel;  Recorded: 10/17/2012;    KS ABLATE HEART DYSRHYTHM FOCUS      Description: Catheter Ablation Atrial Fibrillation;  Recorded: 06/20/2013;  Comments: PVI 3-7-2011 (PVI/cryo + CTI line); ; Repeat PVI July 2011 (PVI/RF + CFE + Roof line + GREGORIO line)    KS ABLATE HEART DYSRHYTHM FOCUS      Description: Catheter Ablation Atrial Fibrillation;  Recorded: 08/05/2014;  Comments: PVI 3-7-2011 (PVI/cryo + CTI line); ; Repeat PVI July 2011 (PVI/RF + CFE + Roof line + GREGORIO line)    KS CARDIOVERSION ELECTIVE ARRHYTHMIA EXTERNAL       Fib 10/17/12; ; Flutter 2/15/13; AFl 11/11/2019    Family History   Problem Relation Age of Onset    Aneurysm Mother         brain    Emphysema Father     Lung Cancer Sister     Hypertension Brother     Lung Cancer Sister     Lung Cancer Sister     Breast Cancer Sister     Social History     Socioeconomic History    Marital status:      Spouse name: Not on file    Number of children: 3    Years of education: Not on file    Highest education level: Not on file   Occupational History    Not on file   Tobacco Use    Smoking status: Former     Types: Cigarettes     Quit date: 4/13/1962      Years since quittin.3    Smokeless tobacco: Never   Substance and Sexual Activity    Alcohol use: Yes     Comment: Alcoholic Drinks/day: 6 per month    Drug use: No    Sexual activity: Yes   Other Topics Concern    Not on file   Social History Narrative    Not on file     Social Determinants of Health     Financial Resource Strain: Not on file   Food Insecurity: Not on file   Transportation Needs: Not on file   Physical Activity: Not on file   Stress: Not on file   Social Connections: Not on file   Intimate Partner Violence: Not on file   Housing Stability: Not on file          Medications  Allergies   Scheduled Meds:  Current Outpatient Medications   Medication Sig Dispense Refill    allopurinol (ZYLOPRIM) 100 MG tablet [ALLOPURINOL (ZYLOPRIM) 100 MG TABLET] Take 100 mg by mouth 2 (two) times a day.             atorvastatin (LIPITOR) 40 MG tablet Take 1 tablet by mouth daily      BD SUELLEN U/F 32G X 4 MM insulin pen needle       glipiZIDE (GLUCOTROL) 10 MG tablet Take 20 mg by mouth daily       Lancets (ONETOUCH DELICA PLUS [id number]) MISC Test once daily varying time,      ONETOUCH VERIO IQ test strip Test 3 times a day.      potassium chloride (KLOR-CON) 10 MEQ CR tablet [POTASSIUM CHLORIDE (KLOR-CON) 10 MEQ CR TABLET] Take 20 mEq by mouth 2 (two) times a day.      SOLIQUA pen Inject 50 Units Subcutaneous every morning (before breakfast)      sotalol (BETAPACE) 80 MG tablet Take 1 tablet (80 mg) by mouth 2 times daily 180 tablet 3    spironolactone-hydrochlorothiazide (ALDACTAZIDE) 25-25 mg tablet [SPIRONOLACTONE-HYDROCHLOROTHIAZIDE (ALDACTAZIDE) 25-25 MG TABLET] 1/2 tab      warfarin (COUMADIN) 2.5 MG tablet [WARFARIN (COUMADIN) 2.5 MG TABLET] Adjust dose based on INR results as directed.      gabapentin (NEURONTIN) 100 MG capsule  (Patient not taking: Reported on 2023)      pregabalin (LYRICA) 50 MG capsule Take 50 mg by mouth every evening      Allergies   Allergen Reactions    Ace Inhibitors Cough     Empagliflozin Other (See Comments)     weakness    Irbesartan Other (See Comments)     exhaustion    Metformin GI Disturbance     2013.  Retry 2016- gastrointestinal intolerance again    Pioglitazone Other (See Comments)     Hand leg and facial swelling         Lab Results    Chemistry/lipid CBC Cardiac Enzymes/BNP/TSH/INR   Lab Results   Component Value Date    BUN 18.5 06/16/2023     06/16/2023    CO2 22 06/16/2023    No results found for: WBC, HGB, HCT, MCV, PLT @RESUFAST(BMP,CBC,BNP,TSH,  INR)@      15 minutes spent reviewing prior records (including documentation, laboratory studies, cardiac testing/imaging), history and physical exam, planning, and subsequent documentation.     This note has been dictated using voice recognition software. Any grammatical, typographical, or context distortions are unintentional and inherent to the software.    Rajani Cruz CNP  Clinical Cardiac Electrophysiology  [facility]  [address]  [place] [address]   Office: [phone]  Fax: [phone]       Thank you for allowing me to participate in the care of your patient.      Sincerely,     TEODORO MEYER CNP     [facility]  cc:   No referring provider defined for this encounter.

## 2023-07-24 ENCOUNTER — TELEPHONE (OUTPATIENT)
Dept: CARDIOLOGY | Facility: CLINIC | Age: 83
End: 2023-07-24
Payer: COMMERCIAL

## 2023-07-24 ENCOUNTER — DOCUMENTATION ONLY (OUTPATIENT)
Dept: CARDIOLOGY | Facility: CLINIC | Age: 83
End: 2023-07-24
Payer: COMMERCIAL

## 2023-07-24 ENCOUNTER — TELEPHONE (OUTPATIENT)
Dept: ANTICOAGULATION | Facility: CLINIC | Age: 83
End: 2023-07-24
Payer: COMMERCIAL

## 2023-07-24 DIAGNOSIS — I48.19 PERSISTENT ATRIAL FIBRILLATION (H): Primary | ICD-10-CM

## 2023-07-24 NOTE — TELEPHONE ENCOUNTER
"ANTICOAGULATION  MANAGEMENT: NEW REFERRAL      SUBJECTIVE/OBJECTIVE     Yvonne Fatima, a 83 year old female  is newly referred to [facility].    Anticoagulation:    Previously on warfarin: yes, currently on warfarin transferring anticoagulation management to [facility]. Previously managed by [facility]. Most recent warfarin dose 1.25 mg M/W/ since unknown, but states she has been on 6 week checks for quite a while.  Most recent INR 2.9 on 23  Warfarin initiation date (approximate): 2014   Indication(s): Atrial Fibrillation   Goal Range:  2-3   Anticoagulation Bridge/Overlap: No   Referring provider: from heartcare provider  [facility] will manage pre and post PVI ablation, will then return to PCP at [facility]    General Dietary/Social Hx:    Typical vitamin K intake: moderate; consistent     Other dietary considerations: None     Social History:   Social History     Tobacco Use    Smoking status: Former     Types: Cigarettes     Quit date: 1962     Years since quittin.3    Smokeless tobacco: Never   Substance Use Topics    Alcohol use: Yes     Comment: Alcoholic Drinks/day: 6 per month    Drug use: No       In the past 2 weeks, patient estimates taking medications as instructed % of time: 100    Results:        No results for input(s): INR, [id number], F2, ALMWH in the last 168 hours.    Wt Readings from Last 2 Encounters:   23 66.2 kg (146 lb)   23 67.1 kg (148 lb)      Estimated body mass index is 27.59 kg/m  as calculated from the following:    Height as of 23: 1.549 m (5' 1\").    Weight as of 23: 66.2 kg (146 lb).  No results found for: AST  Lab Results   Component Value Date    CR 1.08 (H) 2023     Estimated Creatinine Clearance: 34.4 mL/min (A) (based on SCr of 1.08 mg/dL (H)).    ASSESSMENT     Goal INR 2-3, standard for indication(s) above  On warfarin > 30 days; maintenance dose has been established  Maintenance " dose established prior to referral    PLAN     Dosing Instructions: Continue your current warfarin dose with INR in 1-2 weeks       Summary  As of 7/24/2023      Full warfarin instructions:  1.25 mg every Mon, Wed, Fri; 2.5 mg all other days   Next INR check:  8/2/2023               Education provided:   Goal range and lab monitoring: goal range and significance of current result and Importance of following up at instructed interval    Education still needed:   None required      Telephone call with Ellyn who verbalizes understanding and agrees to plan    Lab visit scheduled - scheduled for first INR 8/2/23, then 4 weekly INRs prior to PVI    Standing orders placed in Epic: Point of Care INR (Lab 5000) and POCT INR (POC 15)--for [facility] testing only    Plan made per [facility] anticoagulation protocol    Tatyana Mclean RN  Anticoagulation Clinic  7/24/2023

## 2023-07-30 ENCOUNTER — HOSPITAL ENCOUNTER (OUTPATIENT)
Dept: CT IMAGING | Facility: CLINIC | Age: 83
Discharge: HOME | End: 2023-07-30
Attending: INTERNAL MEDICINE | Admitting: INTERNAL MEDICINE
Payer: COMMERCIAL

## 2023-07-30 DIAGNOSIS — I48.19 PERSISTENT ATRIAL FIBRILLATION (H): ICD-10-CM

## 2023-07-30 LAB
CREAT BLD-MCNC: 0.9 MG/DL (ref 0.6–1.1)
GFR SERPL CREATININE-BSD FRML MDRD: >60 ML/MIN/1.73M2

## 2023-07-30 PROCEDURE — 82565 ASSAY OF CREATININE: CPT

## 2023-07-30 PROCEDURE — 75572 CT HRT W/3D IMAGE: CPT

## 2023-07-30 PROCEDURE — 75572 CT HRT W/3D IMAGE: CPT | Mod: 26 | Performed by: GENERAL ACUTE CARE HOSPITAL

## 2023-07-30 PROCEDURE — 250N000011 HC RX IP 250 OP 636: Performed by: INTERNAL MEDICINE

## 2023-07-30 RX ORDER — IOPAMIDOL 755 MG/ML
100 INJECTION, SOLUTION INTRAVASCULAR ONCE
Status: COMPLETED | OUTPATIENT
Start: 2023-07-30 | End: 2023-07-30

## 2023-07-30 RX ADMIN — IOPAMIDOL 90 ML: 755 INJECTION, SOLUTION INTRAVENOUS at 10:16

## 2023-07-31 LAB
BSA FOR ECHO PROCEDURE: 1.66 M2
CCTA ASCENDING AORTA: 3
CCTA SINUS: 3

## 2023-08-02 ENCOUNTER — LAB (OUTPATIENT)
Dept: LAB | Facility: CLINIC | Age: 83
End: 2023-08-02
Payer: COMMERCIAL

## 2023-08-02 ENCOUNTER — ANTICOAGULATION THERAPY VISIT (OUTPATIENT)
Dept: ANTICOAGULATION | Facility: CLINIC | Age: 83
End: 2023-08-02

## 2023-08-02 DIAGNOSIS — I48.19 PERSISTENT ATRIAL FIBRILLATION (H): Primary | ICD-10-CM

## 2023-08-02 DIAGNOSIS — I48.19 PERSISTENT ATRIAL FIBRILLATION (H): ICD-10-CM

## 2023-08-02 LAB — INR BLD: 2.5 (ref 0.9–1.1)

## 2023-08-02 PROCEDURE — 85610 PROTHROMBIN TIME: CPT

## 2023-08-02 PROCEDURE — 36416 COLLJ CAPILLARY BLOOD SPEC: CPT

## 2023-08-02 NOTE — PROGRESS NOTES
ANTICOAGULATION MANAGEMENT     Yvonne Fatima 83 year old female is on warfarin with therapeutic INR result. (Goal INR 2.0-3.0)    Recent labs: (last 7 days)     08/02/23  1015   INR 2.5*       ASSESSMENT     Warfarin Lab Questionnaire    Warfarin Doses Last 7 Days      8/2/2023    10:20 AM   Dose in Tablet or Mg   TAB or MG? tablet (tab)     Pt Rptd Dose SUNDAY MONDAY TUESDAY WED THURS FRIDAY SATURDAY 8/2/2023  10:20 AM 1 0.5 1 0.5 1 0.5 1         8/2/2023   Warfarin Lab Questionnaire   Missed doses within past 14 days? No   Changes in diet or alcohol within past 14 days? No   Medication changes since last result? No   Injuries or illness since last result? No   New shortness of breath, severe headaches or sudden changes in vision since last result? No   Abnormal bleeding since last result? No   Upcoming surgery, procedure? Yes   Please explain, date scheduled? september 6 - PVI   Best number to call with results? [phone]     Previous result: Therapeutic last 2(+) visits  Additional findings: Upcoming surgery/procedure PVI 9/6 - will need 4 INRs >2 pre and post procedure.        PLAN     Recommended plan for no diet, medication or health factor changes affecting INR     Dosing Instructions: Continue your current warfarin dose with next INR in 1 week       Summary  As of 8/2/2023      Full warfarin instructions:  1.25 mg every Mon, Wed, Fri; 2.5 mg all other days   Next INR check:  8/9/2023               Detailed voice message left for Ellyn with dosing instructions and follow up date.     Lab visit scheduled    Education provided:   Please call back if any changes to your diet, medications or how you've been taking warfarin  Taking warfarin: Importance of taking warfarin as instructed  Goal range and lab monitoring: Importance of therapeutic range and Importance of following up at instructed interval  Dietary considerations: importance of consistent vitamin K intake and impact of vitamin K foods on  INR    Plan made per [facility] anticoagulation protocol    Tatyana Mclean, RN  Anticoagulation Clinic  8/2/2023    _______________________________________________________________________     Anticoagulation Episode Summary       Current INR goal:  2.0-3.0   TTR:  --   Target end date:  10/4/2023   Send INR reminders to:  [facility]    Indications    Persistent atrial fibrillation (H) [I48.19]             Comments:               Anticoagulation Care Providers       Provider Role Specialty Phone number    Timur Corona MD Referring Cardiovascular Disease [phone]

## 2023-08-07 ENCOUNTER — HOSPITAL ENCOUNTER (OUTPATIENT)
Dept: CARDIOLOGY | Facility: HOSPITAL | Age: 83
Discharge: HOME | End: 2023-08-07
Attending: INTERNAL MEDICINE | Admitting: INTERNAL MEDICINE
Payer: COMMERCIAL

## 2023-08-07 DIAGNOSIS — I48.19 PERSISTENT ATRIAL FIBRILLATION (H): ICD-10-CM

## 2023-08-07 LAB — LVEF ECHO: NORMAL

## 2023-08-07 PROCEDURE — 93306 TTE W/DOPPLER COMPLETE: CPT | Mod: 26 | Performed by: INTERNAL MEDICINE

## 2023-08-07 PROCEDURE — 93306 TTE W/DOPPLER COMPLETE: CPT

## 2023-08-09 ENCOUNTER — LAB (OUTPATIENT)
Dept: LAB | Facility: CLINIC | Age: 83
End: 2023-08-09
Payer: COMMERCIAL

## 2023-08-09 ENCOUNTER — ANTICOAGULATION THERAPY VISIT (OUTPATIENT)
Dept: ANTICOAGULATION | Facility: CLINIC | Age: 83
End: 2023-08-09

## 2023-08-09 DIAGNOSIS — I48.19 PERSISTENT ATRIAL FIBRILLATION (H): Primary | ICD-10-CM

## 2023-08-09 DIAGNOSIS — I48.19 PERSISTENT ATRIAL FIBRILLATION (H): ICD-10-CM

## 2023-08-09 LAB — INR BLD: 2.1 (ref 0.9–1.1)

## 2023-08-09 PROCEDURE — 85610 PROTHROMBIN TIME: CPT

## 2023-08-09 PROCEDURE — 36416 COLLJ CAPILLARY BLOOD SPEC: CPT

## 2023-08-09 NOTE — PROGRESS NOTES
ANTICOAGULATION MANAGEMENT     Yvonne Fatima 83 year old female is on warfarin with therapeutic INR result. (Goal INR 2.0-3.0)    Recent labs: (last 7 days)     08/09/23  1012   INR 2.1*       ASSESSMENT     Warfarin Lab Questionnaire    Warfarin Doses Last 7 Days      8/9/2023    10:10 AM   Dose in Tablet or Mg   TAB or MG? tablet (tab)     Pt Rptd Dose SUNDAY MONDAY TUESDAY WED THURS FRIDAY SATURDAY 8/9/2023  10:10 AM 1 0.5 1 0.5 1 1 - 0.5 1         8/9/2023   Warfarin Lab Questionnaire   Missed doses within past 14 days? No   Changes in diet or alcohol within past 14 days? No increased salads the last week   Medication changes since last result? No   Injuries or illness since last result? No   New shortness of breath, severe headaches or sudden changes in vision since last result? No   Abnormal bleeding since last result? No   Upcoming surgery, procedure? Yes   Please explain, date scheduled? Sept 6     Previous result: Therapeutic last 2(+) visits  Additional findings: Upcoming surgery/procedure PVI 9/6 - needs 4 weekly INRs >2 pre and post procedure       PLAN     Recommended plan for temporary change(s) affecting INR     Dosing Instructions: booster dose then continue your current warfarin dose with next INR in 1 week       Summary  As of 8/9/2023      Full warfarin instructions:  8/9: 2.5 mg; Otherwise 1.25 mg every Mon, Wed, Fri; 2.5 mg all other days   Next INR check:  8/16/2023               Telephone call with Ellyn who agrees to plan and repeated back plan correctly    Lab visit scheduled    Education provided:   Goal range and lab monitoring: goal range and significance of current result and Importance of therapeutic range    Plan made per [facility] anticoagulation protocol    Tatyana Mclean, RN  Anticoagulation Clinic  8/9/2023    _______________________________________________________________________     Anticoagulation Episode Summary       Current INR goal:  2.0-3.0   TTR:  100.0 % (6 d)   Target end  date:  10/4/2023   Send INR reminders to:  [facility]    Indications    Persistent atrial fibrillation (H) [I48.19]             Comments:               Anticoagulation Care Providers       Provider Role Specialty Phone number    Timur Corona MD Referring Cardiovascular Disease [phone]             . Warm

## 2023-08-16 ENCOUNTER — LAB (OUTPATIENT)
Dept: LAB | Facility: CLINIC | Age: 83
End: 2023-08-16
Payer: COMMERCIAL

## 2023-08-16 ENCOUNTER — ANTICOAGULATION THERAPY VISIT (OUTPATIENT)
Dept: ANTICOAGULATION | Facility: CLINIC | Age: 83
End: 2023-08-16

## 2023-08-16 DIAGNOSIS — I48.19 PERSISTENT ATRIAL FIBRILLATION (H): ICD-10-CM

## 2023-08-16 DIAGNOSIS — I48.19 PERSISTENT ATRIAL FIBRILLATION (H): Primary | ICD-10-CM

## 2023-08-16 LAB — INR BLD: 3.8 (ref 0.9–1.1)

## 2023-08-16 PROCEDURE — 36416 COLLJ CAPILLARY BLOOD SPEC: CPT

## 2023-08-16 PROCEDURE — 85610 PROTHROMBIN TIME: CPT

## 2023-08-16 NOTE — PROGRESS NOTES
ANTICOAGULATION MANAGEMENT     Yvonne Fatima 83 year old female is on warfarin with supratherapeutic INR result. (Goal INR 2.0-3.0)    Recent labs: (last 7 days)     08/16/23  1015   INR 3.8*       ASSESSMENT     Warfarin Lab Questionnaire    Warfarin Doses Last 7 Days    Pt Rptd Dose SUNDAY MONDAY TUESDAY WED THURS FRIDAY SATURDAY 8/16/2023  10:18 AM 1 0.5 1 0.5 1 0.5 1         8/16/2023   Warfarin Lab Questionnaire   Missed doses within past 14 days? No   Changes in diet or alcohol within past 14 days? No   Medication changes since last result? No   Injuries or illness since last result? No   New shortness of breath, severe headaches or sudden changes in vision since last result? No   Abnormal bleeding since last result? No   Upcoming surgery, procedure? No     Previous result: Therapeutic last 2(+) visits  Additional findings: None       PLAN     Recommended plan for no diet, medication or health factor changes affecting INR     Dosing Instructions: partial hold then continue your current warfarin dose with next INR in 1 week       Summary  As of 8/16/2023      Full warfarin instructions:  8/17: 1.25 mg; Otherwise 1.25 mg every Mon, Wed, Fri; 2.5 mg all other days   Next INR check:  8/23/2023               Telephone call with Ellyn who verbalizes understanding and agrees to plan    Lab visit scheduled    Education provided:   Goal range and lab monitoring: goal range and significance of current result    Plan made per [facility] anticoagulation protocol    Tatyana Mclean RN  Anticoagulation Clinic  8/16/2023    _______________________________________________________________________     Anticoagulation Episode Summary       Current INR goal:  2.0-3.0   TTR:  75.5 % (1.9 wk)   Target end date:  10/4/2023   Send INR reminders to:  [facility]    Indications    Persistent atrial fibrillation (H) [I48.19]             Comments:               Anticoagulation Care Providers       Provider Role  Specialty Phone number    Timur Corona MD Referring Cardiovascular Disease [phone]

## 2023-08-23 ENCOUNTER — LAB (OUTPATIENT)
Dept: LAB | Facility: CLINIC | Age: 83
End: 2023-08-23
Payer: COMMERCIAL

## 2023-08-23 ENCOUNTER — ANTICOAGULATION THERAPY VISIT (OUTPATIENT)
Dept: ANTICOAGULATION | Facility: CLINIC | Age: 83
End: 2023-08-23

## 2023-08-23 DIAGNOSIS — I48.19 PERSISTENT ATRIAL FIBRILLATION (H): Primary | ICD-10-CM

## 2023-08-23 DIAGNOSIS — I48.19 PERSISTENT ATRIAL FIBRILLATION (H): ICD-10-CM

## 2023-08-23 LAB — INR BLD: 3.2 (ref 0.9–1.1)

## 2023-08-23 PROCEDURE — 36416 COLLJ CAPILLARY BLOOD SPEC: CPT

## 2023-08-23 PROCEDURE — 85610 PROTHROMBIN TIME: CPT

## 2023-08-23 NOTE — PROGRESS NOTES
ANTICOAGULATION MANAGEMENT     Yvonne Fatima 83 year old female is on warfarin with supratherapeutic INR result. (Goal INR 2.0-3.0)    Recent labs: (last 7 days)     08/23/23  1011   INR 3.2*       ASSESSMENT     Warfarin Lab Questionnaire    Warfarin Doses Last 7 Days      8/23/2023    10:15 AM   Dose in Tablet or Mg   TAB or MG? tablet (tab)     Pt Rptd Dose SUNDAY MONDAY TUESDAY WED THURS FRIDAY SATURDAY 8/23/2023  10:15 AM 1 0.5 1 0.5 1 0.5 1         8/23/2023   Warfarin Lab Questionnaire   Missed doses within past 14 days? No   Changes in diet or alcohol within past 14 days? No   Medication changes since last result? No   Injuries or illness since last result? No   New shortness of breath, severe headaches or sudden changes in vision since last result? No   Abnormal bleeding since last result? No   Upcoming surgery, procedure? Yes - ablation   Please explain, date scheduled? september 6     Previous result: Supratherapeutic  Additional findings: Upcoming Atrial fib/flutter ablation; weekly INR monitoring. To notify EP pool and cardiologist if INR < 2 if scheduled, INR > 3.3 within a week of ablation/PVI, or non-compliance with monitoring > 1 week.       PLAN     Recommended plan for no diet, medication or health factor changes affecting INR     Dosing Instructions: Continue your current warfarin dose adding some extra Vit K to diet once a week with next INR in 1 week       Summary  As of 8/23/2023      Full warfarin instructions:  1.25 mg every Mon, Wed, Fri; 2.5 mg all other days   Next INR check:  8/30/2023               Detailed voice message left for Ellyn with dosing instructions and follow up date.     Contact [phone]  to schedule and with any changes, questions or concerns.     Education provided:   Please call back if any changes to your diet, medications or how you've been taking warfarin  Goal range and lab monitoring: goal range and significance of current result, Importance of therapeutic  range, and Importance of following up at instructed interval    Plan made per [facility] anticoagulation protocol    Kacey Lagunas, RN  Anticoagulation Clinic  8/23/2023    _______________________________________________________________________     Anticoagulation Episode Summary       Current INR goal:  2.0-3.0   TTR:  49.6 % (2.9 wk)   Target end date:  10/4/2023   Send INR reminders to:  [facility]    Indications    Persistent atrial fibrillation (H) [I48.19]             Comments:               Anticoagulation Care Providers       Provider Role Specialty Phone number    Timur Corona MD Referring Cardiovascular Disease [phone]

## 2023-08-23 NOTE — PROGRESS NOTES
Pt called back and she will get her next INR done on 8/31/23 with her pre-op. Calendar updated. Kacey Lagunas RN, BSN  [facility] Anticoagulation Team

## 2023-08-30 ENCOUNTER — PREP FOR PROCEDURE (OUTPATIENT)
Dept: CARDIOLOGY | Facility: CLINIC | Age: 83
End: 2023-08-30

## 2023-08-30 DIAGNOSIS — I48.19 PERSISTENT ATRIAL FIBRILLATION (H): Primary | ICD-10-CM

## 2023-08-30 RX ORDER — LIDOCAINE 40 MG/G
CREAM TOPICAL
Status: CANCELLED | OUTPATIENT
Start: 2023-08-30

## 2023-08-30 RX ORDER — SODIUM CHLORIDE 9 MG/ML
100 INJECTION, SOLUTION INTRAVENOUS CONTINUOUS
Status: CANCELLED | OUTPATIENT
Start: 2023-09-06

## 2023-08-30 NOTE — H&P (VIEW-ONLY)
[facility]  Cardiac Electrophysiology  [address]  [place] [address]   Office: [phone]  Fax: [phone]     HEART CARE ELECTROPHYSIOLOGY NOTE     Primary Care: Juan Mcmanus MD     Assessment/Recommendations     Persistent atrial fibrillation, atrial flutter: We discussed pulmonary vein isolation ablation procedure including <1-2% risk for major complication, anticipated success rates, recovery and follow-up.  Medical and surgical history reviewed and updated. Current medications and allergies reviewed and updated as appropriate. No personal or family history of adverse reactions to anesthesia or abnormal bleeding with surgery.  -Hold sotalol 3 days prior to ablation with last dose on 9/2/2023  -Start Protonix 40mg every day beginning 3 days prior to ablation (9/3/2023)    [id number] score of 5 for age >75, female gender, hypertension and type 2 diabetes  -Continue warfarin as ordered for stroke prophylaxis, INR's therapeutic over the past >1 month    Essential hypertension: controlled on current regimen     SHAVON: consistent use of CPAP.  Advised to bring for use following procedure     CKD IIIa    Insulin-dependent type 2 diabetes    Follow up: via telephone call with EP RN on 9/8/2023, 6-week clinic follow-up with myself 10/18/2023     History of Present Illness/Subjective    Yvonne Fatima is a 83 year old female who is seen today for history and physical prior to atrial fibrillation ablation. She additionally has a past medical history significant for essential hypertension, SHAVON, type 2 diabetes.  She is status post biatrial catheter ablation March 2011, including cryoballoon PVI and CTI ablation, and July 2011, with radiofrequency PVI, CFV, roofline and GREGORIO line.  She developed recurrent atypical atrial flutter in 2013, requiring cardioversion, with multiple recurrences requiring cardioversion since that time most recently 6/21/2023.  She denies  recurrent symptoms since that time. She denies chest discomfort, palpitations, shortness of breath, lightheadedness/dizziness, pedal edema, or syncope. She keeps busy playing bridge.        Data Review     Arrhythmia hx:   Dx/date: Unknown date of diagnosis.  Catheter ablation 2011 as below, repeat ablation 2011.  Development of atypical atrial flutter , requiring DCCV with prior sotalol initiation.  Multiple recurrences since that time requiring cardioversion in  and .  Symptomatic recurrence 2023, DCCV 2023.  Sx: Lightheadedness, weakness, general malaise  Prior AAD, AV ricardo blocking agents: Sotalol, Multaq  Procedures  Ablation: 2011-PVI (cryo), CTI line.  2011-PVI (RF), CFV, roofline and GREGORIO line.  2023, Dr. Corona    EK2023: Sinus rhythm 66 bpm, AV delay  ms, QRS 94 ms, QT/QTc 432/452 ms  2023: Sinus rhythm 50 bpm, brief second-degree AVB type II,  ms, QT/QTc 488/444 ms  2023: Atypical atrial flutter 105 bpm, QRS 84 ms, QT/QTc 370/489 ms  Personally reviewed.     TTE: 2023  The left ventricle is normal in size.  Left ventricular function is normal.The ejection fraction is 60-65%.  Left ventricular diastolic function is abnormal.  The right ventricle is mildly dilated.  The left atrium is moderately dilated. The right atrium is moderately dilated.  There is severe mitral annular calcification.  There is mild mitral stenosis.  There is moderate (2+) tricuspid regurgitation.  Right ventricular systolic pressure is elevated, consistent with mild  pulmonary hypertension.    Cardiac CT: 2023    Pulmonary vein measurements as detailed below. There are two right pulmonary veins and two left pulmonary veins. There is early branching of the right superior and right inferior pulmonary veins. There is circumferential calcification and mild stenosis of the ostium of the left inferior pulmonary vein.    No thrombus in the left atrium or  "left atrial appendage.    Normal left atrial size. Moderate right atrial enlargement.    Mild right ventricular enlargement.    Moderate burden of multivessel coronary artery calcification. Study was not tailored to evaluate for coronary artery stenosis.    Radiology review for incidental non cardiac findings will be under separate report by the radiologist.       I have reviewed and updated the patient's past medical history, allergies and medication list.               Physical Examination Review of Systems   BP 92/60 (BP Location: Right arm, Patient Position: Sitting, Cuff Size: Adult Regular)   Pulse 66   Resp 16   Ht 1.549 m (5' 1\")   Wt 65.3 kg (144 lb)   BMI 27.21 kg/m      Body mass index is 27.21 kg/m .    Wt Readings from Last 3 Encounters:   08/31/23 65.3 kg (144 lb)   07/19/23 66.2 kg (146 lb)   06/21/23 67.1 kg (148 lb)     General   Appearance:   Alert and oriented, in no acute distress.    HEENT:  Normocephalic and atraumatic. Conjunctiva and sclera are clear. Moist oral mucosa.    Neck: No JVP, carotid bruit or obvious thyromegaly.   Lungs:   Respirations unlabored. Clear bilaterally with no rales, rhonchi, or wheezes.     Cardiovascular:   Rhythm is regular. S1 and S2 are normal. No significant murmur is present. Lower extremities demonstrate no significant edema. Posterior tibial pulses are intact bilaterally.   Extremities: No cyanosis or clubbing   Skin: Skin is warm, dry, and otherwise intact.   Neurologic: Gait not asssessed. Mood and affect appropriate.                                                Medical History  Surgical History Family History Social History   Past Medical History:   Diagnosis Date    Atrial fibrillation (H)     [id number] score of 7-on chronic warfarin Previously failed Sotalol and Multaq therapy PVI 3/11 Repeat PVI 7/11  Recurrent A fib, most recent CV Feb 2013 Rx sotalol     Atrial flutter (H)     Atypical flutter with RVR after second PVI in July 2011      " Esophageal reflux     Created by Conversion     Essential hypertension          Status post catheter ablation of atrial fibrillation 6/3/2019    PVI 3/11(PVI/Cryo + CTI line) Repeat PVI  (PVI/RF + CFE + Roof line + GREGORIO line)    Past Surgical History:   Procedure Laterality Date    HC REMOVE TONSILS/ADENOIDS,<13 Y/O      Description: Tonsillectomy With Adenoidectomy;  Recorded: 10/17/2012;    HC REVISE MEDIAN N/CARPAL TUNNEL SURG      Description: Neuroplasty Decompression Median Nerve At Carpal Tunnel;  Recorded: 10/17/2012;    LA ABLATE HEART DYSRHYTHM FOCUS      Description: Catheter Ablation Atrial Fibrillation;  Recorded: 2013;  Comments: PVI 3-7-2011 (PVI/cryo + CTI line); ; Repeat PVI 2011 (PVI/RF + CFE + Roof line + GREGORIO line)    LA ABLATE HEART DYSRHYTHM FOCUS      Description: Catheter Ablation Atrial Fibrillation;  Recorded: 2014;  Comments: PVI 3-7-2011 (PVI/cryo + CTI line); ; Repeat PVI 2011 (PVI/RF + CFE + Roof line + GREGORIO line)    LA CARDIOVERSION ELECTIVE ARRHYTHMIA EXTERNAL       Fib 10/17/12; ; Flutter 2/15/13; AFl 2019    Family History   Problem Relation Age of Onset    Aneurysm Mother         brain    Emphysema Father     Lung Cancer Sister     Hypertension Brother     Lung Cancer Sister     Lung Cancer Sister     Breast Cancer Sister     Social History     Tobacco Use    Smoking status: Former     Types: Cigarettes     Quit date: 1962     Years since quittin.4    Smokeless tobacco: Never   Substance Use Topics    Alcohol use: Yes     Comment: Alcoholic Drinks/day: 6 per month    Drug use: No          Medications  Allergies   Current Outpatient Medications   Medication Sig Dispense Refill    allopurinol (ZYLOPRIM) 100 MG tablet [ALLOPURINOL (ZYLOPRIM) 100 MG TABLET] Take 100 mg by mouth 2 (two) times a day.             atorvastatin (LIPITOR) 40 MG tablet Take 1 tablet by mouth daily      BD SUELLEN U/F 32G X 4 MM insulin pen needle       gabapentin  (NEURONTIN) 100 MG capsule       glipiZIDE (GLUCOTROL) 10 MG tablet Take 20 mg by mouth daily       Lancets (ONETOUCH DELICA PLUS [id number]) MISC Test once daily varying time,      ONETOUCH VERIO IQ test strip Test 3 times a day.      potassium chloride (KLOR-CON) 10 MEQ CR tablet [POTASSIUM CHLORIDE (KLOR-CON) 10 MEQ CR TABLET] Take 20 mEq by mouth 2 (two) times a day.      SOLIQUA pen Inject 50 Units Subcutaneous every morning (before breakfast)      sotalol (BETAPACE) 80 MG tablet Take 1 tablet (80 mg) by mouth 2 times daily 180 tablet 3    spironolactone-hydrochlorothiazide (ALDACTAZIDE) 25-25 mg tablet [SPIRONOLACTONE-HYDROCHLOROTHIAZIDE (ALDACTAZIDE) 25-25 MG TABLET] 1/2 tab      warfarin (COUMADIN) 2.5 MG tablet [WARFARIN (COUMADIN) 2.5 MG TABLET] Adjust dose based on INR results as directed.      pregabalin (LYRICA) 50 MG capsule Take 50 mg by mouth every evening      Allergies   Allergen Reactions    Ace Inhibitors Cough    Empagliflozin Other (See Comments)     weakness    Irbesartan Other (See Comments)     exhaustion    Metformin GI Disturbance     2013.  Retry 2016- gastrointestinal intolerance again    Pioglitazone Other (See Comments)     Hand leg and facial swelling     No complications with prior use GA.       Lab Results    Chemistry/lipid CBC Cardiac Enzymes/BNP/TSH/INR   Lab Results   Component Value Date    BUN 18.5 06/16/2023     06/16/2023    CO2 22 06/16/2023     No results found for: CREATININE    No results found for: CHOL, HDL, LDL, CHOLHDL   No results found for: WBC, HGB, HCT, MCV, PLT Lab Results   Component Value Date    INR 3.2 (H) 08/23/2023        13 minutes spent reviewing prior records (including documentation, laboratory studies, cardiac testing/imaging), history and physical exam, planning, and subsequent documentation.     This note has been dictated using voice recognition software. Any grammatical, typographical, or context distortions are unintentional and inherent to  the software.    Rajani Mcgrath CNP  Clinical Cardiac Electrophysiology  [facility]  Clinic and [phone]  Fax: [phone]  Electrophysiology Nurses: [phone]

## 2023-08-30 NOTE — PATIENT INSTRUCTIONS
Yvonne Fatima,    It was a pleasure to see you today at the [facility].     Before ablation:   -Continue your warfarin as ordered for stroke prevention  -Stop sotalol after your last dose on 9/5/2023.   -On 9/6/2023, start pantoprazole 40mg by mouth once a day. We will continue this medication for the next 6 weeks after ablation.   -If you are using a CPAP, bring the CPAP machine to the hospital the day of your procedure.     After ablation:   -You should arrange for someone to stay with you for the first 24 hours after discharge due to receiving sedating medications.   -Continue your blood thinner before and after the ablation procedure for stroke prevention.   -No driving for 3 days after your procedure. No lifting more than 10-20 pounds or strenuous exercise for 3-5 days after your procedure to allow for groin site healing.   -Short episodes of atrial fibrillation can be common after your procedure. Call the office if episodes are lasting longer than 4 hours.  -Call the office if you are experiencing increasing bleeding or pain at groin sites, lump that is larger than a walnut, or fever.   -Dial 911 if you have any NEW signs or symptoms of a stroke, including but not limited to injuries in vision, problems talking, numbness on one side of your face or body, sudden headache, confusion, or problems with walking.  -Do not hesitate to call the office with additional questions or concerns.    Rajani Mcgrath CNP  Clinical Cardiac Electrophysiology  [facility]  Office: [phone]  Fax: [phone]   Nurses: [phone]

## 2023-08-30 NOTE — PROGRESS NOTES
[facility]  Cardiac Electrophysiology  [address]  [place] [address]   Office: [phone]  Fax: [phone]     HEART CARE ELECTROPHYSIOLOGY NOTE     Primary Care: Juan Mcmanus MD     Assessment/Recommendations     Persistent atrial fibrillation, atrial flutter: We discussed pulmonary vein isolation ablation procedure including <1-2% risk for major complication, anticipated success rates, recovery and follow-up.  Medical and surgical history reviewed and updated. Current medications and allergies reviewed and updated as appropriate. No personal or family history of adverse reactions to anesthesia or abnormal bleeding with surgery.  -Hold sotalol 3 days prior to ablation with last dose on 9/2/2023  -Start Protonix 40mg every day beginning 3 days prior to ablation (9/3/2023)    [id number] score of 5 for age >75, female gender, hypertension and type 2 diabetes  -Continue warfarin as ordered for stroke prophylaxis, INR's therapeutic over the past >1 month    Essential hypertension: controlled on current regimen     SHAVON: consistent use of CPAP.  Advised to bring for use following procedure     CKD IIIa    Insulin-dependent type 2 diabetes    Follow up: via telephone call with EP RN on 9/8/2023, 6-week clinic follow-up with myself 10/18/2023     History of Present Illness/Subjective    Yvonne Fatima is a 83 year old female who is seen today for history and physical prior to atrial fibrillation ablation. She additionally has a past medical history significant for essential hypertension, SHAVON, type 2 diabetes.  She is status post biatrial catheter ablation March 2011, including cryoballoon PVI and CTI ablation, and July 2011, with radiofrequency PVI, CFV, roofline and GREGORIO line.  She developed recurrent atypical atrial flutter in 2013, requiring cardioversion, with multiple recurrences requiring cardioversion since that time most recently 6/21/2023.  She denies  recurrent symptoms since that time. She denies chest discomfort, palpitations, shortness of breath, lightheadedness/dizziness, pedal edema, or syncope. She keeps busy playing bridge.        Data Review     Arrhythmia hx:   Dx/date: Unknown date of diagnosis.  Catheter ablation 2011 as below, repeat ablation 2011.  Development of atypical atrial flutter , requiring DCCV with prior sotalol initiation.  Multiple recurrences since that time requiring cardioversion in  and .  Symptomatic recurrence 2023, DCCV 2023.  Sx: Lightheadedness, weakness, general malaise  Prior AAD, AV ricardo blocking agents: Sotalol, Multaq  Procedures  Ablation: 2011-PVI (cryo), CTI line.  2011-PVI (RF), CFV, roofline and GREGORIO line.  2023, Dr. Corona    EK2023: Sinus rhythm 66 bpm, AV delay  ms, QRS 94 ms, QT/QTc 432/452 ms  2023: Sinus rhythm 50 bpm, brief second-degree AVB type II,  ms, QT/QTc 488/444 ms  2023: Atypical atrial flutter 105 bpm, QRS 84 ms, QT/QTc 370/489 ms  Personally reviewed.     TTE: 2023  The left ventricle is normal in size.  Left ventricular function is normal.The ejection fraction is 60-65%.  Left ventricular diastolic function is abnormal.  The right ventricle is mildly dilated.  The left atrium is moderately dilated. The right atrium is moderately dilated.  There is severe mitral annular calcification.  There is mild mitral stenosis.  There is moderate (2+) tricuspid regurgitation.  Right ventricular systolic pressure is elevated, consistent with mild  pulmonary hypertension.    Cardiac CT: 2023    Pulmonary vein measurements as detailed below. There are two right pulmonary veins and two left pulmonary veins. There is early branching of the right superior and right inferior pulmonary veins. There is circumferential calcification and mild stenosis of the ostium of the left inferior pulmonary vein.    No thrombus in the left atrium or  "left atrial appendage.    Normal left atrial size. Moderate right atrial enlargement.    Mild right ventricular enlargement.    Moderate burden of multivessel coronary artery calcification. Study was not tailored to evaluate for coronary artery stenosis.    Radiology review for incidental non cardiac findings will be under separate report by the radiologist.       I have reviewed and updated the patient's past medical history, allergies and medication list.               Physical Examination Review of Systems   BP 92/60 (BP Location: Right arm, Patient Position: Sitting, Cuff Size: Adult Regular)   Pulse 66   Resp 16   Ht 1.549 m (5' 1\")   Wt 65.3 kg (144 lb)   BMI 27.21 kg/m      Body mass index is 27.21 kg/m .    Wt Readings from Last 3 Encounters:   08/31/23 65.3 kg (144 lb)   07/19/23 66.2 kg (146 lb)   06/21/23 67.1 kg (148 lb)     General   Appearance:   Alert and oriented, in no acute distress.    HEENT:  Normocephalic and atraumatic. Conjunctiva and sclera are clear. Moist oral mucosa.    Neck: No JVP, carotid bruit or obvious thyromegaly.   Lungs:   Respirations unlabored. Clear bilaterally with no rales, rhonchi, or wheezes.     Cardiovascular:   Rhythm is regular. S1 and S2 are normal. No significant murmur is present. Lower extremities demonstrate no significant edema. Posterior tibial pulses are intact bilaterally.   Extremities: No cyanosis or clubbing   Skin: Skin is warm, dry, and otherwise intact.   Neurologic: Gait not asssessed. Mood and affect appropriate.                                                Medical History  Surgical History Family History Social History   Past Medical History:   Diagnosis Date    Atrial fibrillation (H)     [id number] score of 7-on chronic warfarin Previously failed Sotalol and Multaq therapy PVI 3/11 Repeat PVI 7/11  Recurrent A fib, most recent CV Feb 2013 Rx sotalol     Atrial flutter (H)     Atypical flutter with RVR after second PVI in July 2011      " Esophageal reflux     Created by Conversion     Essential hypertension          Status post catheter ablation of atrial fibrillation 6/3/2019    PVI 3/11(PVI/Cryo + CTI line) Repeat PVI  (PVI/RF + CFE + Roof line + GREGORIO line)    Past Surgical History:   Procedure Laterality Date    HC REMOVE TONSILS/ADENOIDS,<11 Y/O      Description: Tonsillectomy With Adenoidectomy;  Recorded: 10/17/2012;    HC REVISE MEDIAN N/CARPAL TUNNEL SURG      Description: Neuroplasty Decompression Median Nerve At Carpal Tunnel;  Recorded: 10/17/2012;    NC ABLATE HEART DYSRHYTHM FOCUS      Description: Catheter Ablation Atrial Fibrillation;  Recorded: 2013;  Comments: PVI 3-7-2011 (PVI/cryo + CTI line); ; Repeat PVI 2011 (PVI/RF + CFE + Roof line + GREGORIO line)    NC ABLATE HEART DYSRHYTHM FOCUS      Description: Catheter Ablation Atrial Fibrillation;  Recorded: 2014;  Comments: PVI 3-7-2011 (PVI/cryo + CTI line); ; Repeat PVI 2011 (PVI/RF + CFE + Roof line + GREGORIO line)    NC CARDIOVERSION ELECTIVE ARRHYTHMIA EXTERNAL       Fib 10/17/12; ; Flutter 2/15/13; AFl 2019    Family History   Problem Relation Age of Onset    Aneurysm Mother         brain    Emphysema Father     Lung Cancer Sister     Hypertension Brother     Lung Cancer Sister     Lung Cancer Sister     Breast Cancer Sister     Social History     Tobacco Use    Smoking status: Former     Types: Cigarettes     Quit date: 1962     Years since quittin.4    Smokeless tobacco: Never   Substance Use Topics    Alcohol use: Yes     Comment: Alcoholic Drinks/day: 6 per month    Drug use: No          Medications  Allergies   Current Outpatient Medications   Medication Sig Dispense Refill    allopurinol (ZYLOPRIM) 100 MG tablet [ALLOPURINOL (ZYLOPRIM) 100 MG TABLET] Take 100 mg by mouth 2 (two) times a day.             atorvastatin (LIPITOR) 40 MG tablet Take 1 tablet by mouth daily      BD SUELLEN U/F 32G X 4 MM insulin pen needle       gabapentin  (NEURONTIN) 100 MG capsule       glipiZIDE (GLUCOTROL) 10 MG tablet Take 20 mg by mouth daily       Lancets (ONETOUCH DELICA PLUS [id number]) MISC Test once daily varying time,      ONETOUCH VERIO IQ test strip Test 3 times a day.      potassium chloride (KLOR-CON) 10 MEQ CR tablet [POTASSIUM CHLORIDE (KLOR-CON) 10 MEQ CR TABLET] Take 20 mEq by mouth 2 (two) times a day.      SOLIQUA pen Inject 50 Units Subcutaneous every morning (before breakfast)      sotalol (BETAPACE) 80 MG tablet Take 1 tablet (80 mg) by mouth 2 times daily 180 tablet 3    spironolactone-hydrochlorothiazide (ALDACTAZIDE) 25-25 mg tablet [SPIRONOLACTONE-HYDROCHLOROTHIAZIDE (ALDACTAZIDE) 25-25 MG TABLET] 1/2 tab      warfarin (COUMADIN) 2.5 MG tablet [WARFARIN (COUMADIN) 2.5 MG TABLET] Adjust dose based on INR results as directed.      pregabalin (LYRICA) 50 MG capsule Take 50 mg by mouth every evening      Allergies   Allergen Reactions    Ace Inhibitors Cough    Empagliflozin Other (See Comments)     weakness    Irbesartan Other (See Comments)     exhaustion    Metformin GI Disturbance     2013.  Retry 2016- gastrointestinal intolerance again    Pioglitazone Other (See Comments)     Hand leg and facial swelling     No complications with prior use GA.       Lab Results    Chemistry/lipid CBC Cardiac Enzymes/BNP/TSH/INR   Lab Results   Component Value Date    BUN 18.5 06/16/2023     06/16/2023    CO2 22 06/16/2023     No results found for: CREATININE    No results found for: CHOL, HDL, LDL, CHOLHDL   No results found for: WBC, HGB, HCT, MCV, PLT Lab Results   Component Value Date    INR 3.2 (H) 08/23/2023        13 minutes spent reviewing prior records (including documentation, laboratory studies, cardiac testing/imaging), history and physical exam, planning, and subsequent documentation.     This note has been dictated using voice recognition software. Any grammatical, typographical, or context distortions are unintentional and inherent to  the software.    Rajani Mcgrath CNP  Clinical Cardiac Electrophysiology  [facility]  Clinic and [phone]  Fax: [phone]  Electrophysiology Nurses: [phone]

## 2023-08-31 ENCOUNTER — ALLIED HEALTH/NURSE VISIT (OUTPATIENT)
Dept: CARDIOLOGY | Facility: CLINIC | Age: 83
End: 2023-08-31
Payer: COMMERCIAL

## 2023-08-31 ENCOUNTER — OFFICE VISIT (OUTPATIENT)
Dept: CARDIOLOGY | Facility: CLINIC | Age: 83
End: 2023-08-31
Payer: COMMERCIAL

## 2023-08-31 ENCOUNTER — LAB (OUTPATIENT)
Dept: CARDIOLOGY | Facility: CLINIC | Age: 83
End: 2023-08-31
Payer: COMMERCIAL

## 2023-08-31 VITALS
RESPIRATION RATE: 16 BRPM | BODY MASS INDEX: 27.19 KG/M2 | HEART RATE: 66 BPM | HEIGHT: 61 IN | SYSTOLIC BLOOD PRESSURE: 92 MMHG | WEIGHT: 144 LBS | DIASTOLIC BLOOD PRESSURE: 60 MMHG

## 2023-08-31 DIAGNOSIS — I48.19 PERSISTENT ATRIAL FIBRILLATION (H): ICD-10-CM

## 2023-08-31 DIAGNOSIS — I48.19 PERSISTENT ATRIAL FIBRILLATION (H): Primary | ICD-10-CM

## 2023-08-31 DIAGNOSIS — N18.31 CHRONIC KIDNEY DISEASE, STAGE 3A (H): ICD-10-CM

## 2023-08-31 DIAGNOSIS — I10 ESSENTIAL HYPERTENSION: ICD-10-CM

## 2023-08-31 DIAGNOSIS — G47.33 OSA ON CPAP: ICD-10-CM

## 2023-08-31 DIAGNOSIS — I48.4 ATYPICAL ATRIAL FLUTTER (H): ICD-10-CM

## 2023-08-31 LAB
ANION GAP SERPL CALCULATED.3IONS-SCNC: 12 MMOL/L (ref 7–15)
ATRIAL RATE - MUSE: 66 BPM
BUN SERPL-MCNC: 18.3 MG/DL (ref 8–23)
CALCIUM SERPL-MCNC: 9.5 MG/DL (ref 8.8–10.2)
CHLORIDE SERPL-SCNC: 103 MMOL/L (ref 98–107)
CREAT SERPL-MCNC: 0.84 MG/DL (ref 0.51–0.95)
DEPRECATED HCO3 PLAS-SCNC: 23 MMOL/L (ref 22–29)
DIASTOLIC BLOOD PRESSURE - MUSE: NORMAL MMHG
ERYTHROCYTE [DISTWIDTH] IN BLOOD BY AUTOMATED COUNT: 13.3 % (ref 10–15)
GFR SERPL CREATININE-BSD FRML MDRD: 69 ML/MIN/1.73M2
GLUCOSE SERPL-MCNC: 183 MG/DL (ref 70–99)
HCT VFR BLD AUTO: 40.3 % (ref 35–47)
HGB BLD-MCNC: 13.4 G/DL (ref 11.7–15.7)
INTERPRETATION ECG - MUSE: NORMAL
MCH RBC QN AUTO: 29.8 PG (ref 26.5–33)
MCHC RBC AUTO-ENTMCNC: 33.3 G/DL (ref 31.5–36.5)
MCV RBC AUTO: 90 FL (ref 78–100)
P AXIS - MUSE: 78 DEGREES
PLATELET # BLD AUTO: 229 10E3/UL (ref 150–450)
POTASSIUM SERPL-SCNC: 4.2 MMOL/L (ref 3.4–5.3)
PR INTERVAL - MUSE: 216 MS
QRS DURATION - MUSE: 94 MS
QT - MUSE: 432 MS
QTC - MUSE: 452 MS
R AXIS - MUSE: 18 DEGREES
RBC # BLD AUTO: 4.5 10E6/UL (ref 3.8–5.2)
SODIUM SERPL-SCNC: 138 MMOL/L (ref 136–145)
SYSTOLIC BLOOD PRESSURE - MUSE: NORMAL MMHG
T AXIS - MUSE: 19 DEGREES
VENTRICULAR RATE- MUSE: 66 BPM
WBC # BLD AUTO: 7.9 10E3/UL (ref 4–11)

## 2023-08-31 PROCEDURE — 99207 PR NO CHARGE NURSE ONLY: CPT

## 2023-08-31 PROCEDURE — 85027 COMPLETE CBC AUTOMATED: CPT

## 2023-08-31 PROCEDURE — 80048 BASIC METABOLIC PNL TOTAL CA: CPT

## 2023-08-31 PROCEDURE — 36415 COLL VENOUS BLD VENIPUNCTURE: CPT

## 2023-08-31 PROCEDURE — 93000 ELECTROCARDIOGRAM COMPLETE: CPT | Performed by: INTERNAL MEDICINE

## 2023-08-31 PROCEDURE — 99214 OFFICE O/P EST MOD 30 MIN: CPT | Performed by: NURSE PRACTITIONER

## 2023-08-31 RX ORDER — PANTOPRAZOLE SODIUM 40 MG/1
40 TABLET, DELAYED RELEASE ORAL DAILY
Qty: 45 TABLET | Refills: 0 | Status: SHIPPED | OUTPATIENT
Start: 2023-08-31 | End: 2023-10-18

## 2023-08-31 NOTE — PROGRESS NOTES
Pre-Procedure Pulmonary Vein Ablation (AF) Education    Procedure: PVI with Dr Corona on 9/6/23 with arrival time 10:00 am    COVID: Pt denies COVID like symptoms, and is aware if he/she develops COVID like symptoms they would need to complete an at home with a rapid antigen COVID test 1-2 days prior to your procedure date. If COVID + pt is aware the procedure will need to be rescheduled, and to contact CV scheduling as soon as possible    Type & Screen: Is not required for PVI Ablation    Pre-Op H&P: Completed today with EP HEAVEN- See record in Epic    Education:   Reviewed with pt in Clinic today  Pre-Procedure Instruction: NPO after midnight pre procedure, Defined NPO, Remove all jewelry and leave all valuables at home, Shower prior to arrival, Anesthesia and intubation plan/orders, Intra-procedure PVI process, Post- PVI procedure expectations/recovery, Transportation requirements and arrangements post procedure, Post-procedure follow up process, Letter sent to pt via [employer] and mail with written instructions (Refer to letters tab), Lab results would be called to pt if abnormal  Risks Reviewed:   Pulmonary Vein/AF/Radiofrequency Ablation  In addition to standard risks for Radiofrequency Ablation, there is:  <2% for significant pulmonary vein stenosis  <2% risk for embolic events  <1% risk for esophageal fistula  <1% risk death    Pulmonary Vein Isolation / Cryoablation Risks:  1-2% risk for phrenic nerve paralysis  <1% risk for pulmonary vein stenosis  Risk of esophageal irritation with no incidence of atrial-esophageal fistula  Rare cryoballoon rupture  <1% risk death       Cardiac Catheter Ablation  <1% risk for the following: hypotension, hemorrhage, vascular injury including perforation of vein, artery or heart, thrombophlebitis, systemic or pulmonic emboli; cardiac perforation, (tamponade), infection, pneumothorax, arrhythmias, proarrhythmic effects of drugs, radiation exposure.  1-2% complete heart block (for  AVNRT or septol accessory pathway).  <0.5% CVA or MI  <0.1% death  If external defibrillation is needed, 75% risk for superficial burn.  1-2% tamponade and aortic puncture with left sided transeptal approach for left side MELITON - increase risk of CVA to <2%.  Late arrhythmia recurrences depends upon the primary rhythm disturbance.    Medication:   Instructions regarding anticoagulants: Warfarin- Continue anticoagulation uninterrupted through their procedure, importance of taking AC for stroke prevention, taking AC as prescribed, to call prior to PVI if missed a dose of AC, if upon arrival pt INR < 2 PVI will potentially be cnx/postponed, compliant with obtaining INRs- See listed INR values below  Instructions given to pt regarding antiarrhythmic medication: Sotalol- Hold 3 days prior to procedure  Instructions given to pt regarding PPI medication: Start Protonix 40mg Daily 3 days prior, 6wk post  Instructions for medication, other than anticoagulants and antiarrhythmics listed above, given to pt: hold all medication AM of procedure     Important patient information for staff: None    8/31/2023 8:05 AM  Fiona Hernández RN

## 2023-08-31 NOTE — LETTER
8/31/2023    DESHAUN YBARRA  [address] [facility] [address]    RE: Yvonne Fatima       Dear Colleague,     I had the pleasure of seeing Yvonne Fatima in the [facility].       [facility]  Cardiac Electrophysiology  [address]  [place] [address]   Office: [phone]  Fax: [phone]     HEART CARE ELECTROPHYSIOLOGY NOTE     Primary Care: Deshaun Ybarra MD     Assessment/Recommendations     Persistent atrial fibrillation, atrial flutter: We discussed pulmonary vein isolation ablation procedure including <1-2% risk for major complication, anticipated success rates, recovery and follow-up.  Medical and surgical history reviewed and updated. Current medications and allergies reviewed and updated as appropriate. No personal or family history of adverse reactions to anesthesia or abnormal bleeding with surgery.  -Hold sotalol 3 days prior to ablation with last dose on 9/2/2023  -Start Protonix 40mg every day beginning 3 days prior to ablation (9/3/2023)    [id number] score of 5 for age >75, female gender, hypertension and type 2 diabetes  -Continue warfarin as ordered for stroke prophylaxis, INR's therapeutic over the past >1 month    Essential hypertension: controlled on current regimen     SHAVON: consistent use of CPAP.  Advised to bring for use following procedure     CKD IIIa    Insulin-dependent type 2 diabetes    Follow up: via telephone call with EP RN on 9/8/2023, 6-week clinic follow-up with myself 10/18/2023     History of Present Illness/Subjective    Yvonne Fatima is a 83 year old female who is seen today for history and physical prior to atrial fibrillation ablation. She additionally has a past medical history significant for essential hypertension, SHAVON, type 2 diabetes.  She is status post biatrial catheter ablation March 2011, including cryoballoon PVI and CTI ablation, and July 2011, with radiofrequency PVI, CFV,  roofline and GREGORIO line.  She developed recurrent atypical atrial flutter in , requiring cardioversion, with multiple recurrences requiring cardioversion since that time most recently 2023.  She denies recurrent symptoms since that time. She denies chest discomfort, palpitations, shortness of breath, lightheadedness/dizziness, pedal edema, or syncope. She keeps busy playing bridge.        Data Review     Arrhythmia hx:   Dx/date: Unknown date of diagnosis.  Catheter ablation 2011 as below, repeat ablation 2011.  Development of atypical atrial flutter , requiring DCCV with prior sotalol initiation.  Multiple recurrences since that time requiring cardioversion in  and .  Symptomatic recurrence 2023, DCCV 2023.  Sx: Lightheadedness, weakness, general malaise  Prior AAD, AV ricardo blocking agents: Sotalol, Multaq  Procedures  Ablation: 2011-PVI (cryo), CTI line.  2011-PVI (RF), CFV, roofline and GREGORIO line.  2023, Dr. Corona    EK2023: Sinus rhythm 66 bpm, AV delay  ms, QRS 94 ms, QT/QTc 432/452 ms  2023: Sinus rhythm 50 bpm, brief second-degree AVB type II,  ms, QT/QTc 488/444 ms  2023: Atypical atrial flutter 105 bpm, QRS 84 ms, QT/QTc 370/489 ms  Personally reviewed.     TTE: 2023  The left ventricle is normal in size.  Left ventricular function is normal.The ejection fraction is 60-65%.  Left ventricular diastolic function is abnormal.  The right ventricle is mildly dilated.  The left atrium is moderately dilated. The right atrium is moderately dilated.  There is severe mitral annular calcification.  There is mild mitral stenosis.  There is moderate (2+) tricuspid regurgitation.  Right ventricular systolic pressure is elevated, consistent with mild  pulmonary hypertension.    Cardiac CT: 2023    Pulmonary vein measurements as detailed below. There are two right pulmonary veins and two left pulmonary veins. There is early  "branching of the right superior and right inferior pulmonary veins. There is circumferential calcification and mild stenosis of the ostium of the left inferior pulmonary vein.    No thrombus in the left atrium or left atrial appendage.    Normal left atrial size. Moderate right atrial enlargement.    Mild right ventricular enlargement.    Moderate burden of multivessel coronary artery calcification. Study was not tailored to evaluate for coronary artery stenosis.    Radiology review for incidental non cardiac findings will be under separate report by the radiologist.       I have reviewed and updated the patient's past medical history, allergies and medication list.               Physical Examination Review of Systems   BP 92/60 (BP Location: Right arm, Patient Position: Sitting, Cuff Size: Adult Regular)   Pulse 66   Resp 16   Ht 1.549 m (5' 1\")   Wt 65.3 kg (144 lb)   BMI 27.21 kg/m      Body mass index is 27.21 kg/m .    Wt Readings from Last 3 Encounters:   08/31/23 65.3 kg (144 lb)   07/19/23 66.2 kg (146 lb)   06/21/23 67.1 kg (148 lb)     General   Appearance:   Alert and oriented, in no acute distress.    HEENT:  Normocephalic and atraumatic. Conjunctiva and sclera are clear. Moist oral mucosa.    Neck: No JVP, carotid bruit or obvious thyromegaly.   Lungs:   Respirations unlabored. Clear bilaterally with no rales, rhonchi, or wheezes.     Cardiovascular:   Rhythm is regular. S1 and S2 are normal. No significant murmur is present. Lower extremities demonstrate no significant edema. Posterior tibial pulses are intact bilaterally.   Extremities: No cyanosis or clubbing   Skin: Skin is warm, dry, and otherwise intact.   Neurologic: Gait not asssessed. Mood and affect appropriate.                                                Medical History  Surgical History Family History Social History   Past Medical History:   Diagnosis Date    Atrial fibrillation (H)     [id number] score of 7-on chronic warfarin " Previously failed Sotalol and Multaq therapy PVI 3/11 Repeat PVI   Recurrent A fib, most recent CV 2013 Rx sotalol     Atrial flutter (H)     Atypical flutter with RVR after second PVI in 2011      Esophageal reflux     Created by Conversion     Essential hypertension          Status post catheter ablation of atrial fibrillation 6/3/2019    PVI 3/11(PVI/Cryo + CTI line) Repeat PVI  (PVI/RF + CFE + Roof line + GREGORIO line)    Past Surgical History:   Procedure Laterality Date    HC REMOVE TONSILS/ADENOIDS,<13 Y/O      Description: Tonsillectomy With Adenoidectomy;  Recorded: 10/17/2012;    HC REVISE MEDIAN N/CARPAL TUNNEL SURG      Description: Neuroplasty Decompression Median Nerve At Carpal Tunnel;  Recorded: 10/17/2012;    KS ABLATE HEART DYSRHYTHM FOCUS      Description: Catheter Ablation Atrial Fibrillation;  Recorded: 2013;  Comments: PVI 3-7-2011 (PVI/cryo + CTI line); ; Repeat PVI 2011 (PVI/RF + CFE + Roof line + GREGORIO line)    KS ABLATE HEART DYSRHYTHM FOCUS      Description: Catheter Ablation Atrial Fibrillation;  Recorded: 2014;  Comments: PVI 3-7-2011 (PVI/cryo + CTI line); ; Repeat PVI 2011 (PVI/RF + CFE + Roof line + GREGORIO line)    KS CARDIOVERSION ELECTIVE ARRHYTHMIA EXTERNAL       Fib 10/17/12; ; Flutter 2/15/13; AFl 2019    Family History   Problem Relation Age of Onset    Aneurysm Mother         brain    Emphysema Father     Lung Cancer Sister     Hypertension Brother     Lung Cancer Sister     Lung Cancer Sister     Breast Cancer Sister     Social History     Tobacco Use    Smoking status: Former     Types: Cigarettes     Quit date: 1962     Years since quittin.4    Smokeless tobacco: Never   Substance Use Topics    Alcohol use: Yes     Comment: Alcoholic Drinks/day: 6 per month    Drug use: No          Medications  Allergies   Current Outpatient Medications   Medication Sig Dispense Refill    allopurinol (ZYLOPRIM) 100 MG tablet [ALLOPURINOL  (ZYLOPRIM) 100 MG TABLET] Take 100 mg by mouth 2 (two) times a day.             atorvastatin (LIPITOR) 40 MG tablet Take 1 tablet by mouth daily      BD SUELLEN U/F 32G X 4 MM insulin pen needle       gabapentin (NEURONTIN) 100 MG capsule       glipiZIDE (GLUCOTROL) 10 MG tablet Take 20 mg by mouth daily       Lancets (ONETOUCH DELICA PLUS [id number]) MISC Test once daily varying time,      ONETOUCH VERIO IQ test strip Test 3 times a day.      potassium chloride (KLOR-CON) 10 MEQ CR tablet [POTASSIUM CHLORIDE (KLOR-CON) 10 MEQ CR TABLET] Take 20 mEq by mouth 2 (two) times a day.      SOLIQUA pen Inject 50 Units Subcutaneous every morning (before breakfast)      sotalol (BETAPACE) 80 MG tablet Take 1 tablet (80 mg) by mouth 2 times daily 180 tablet 3    spironolactone-hydrochlorothiazide (ALDACTAZIDE) 25-25 mg tablet [SPIRONOLACTONE-HYDROCHLOROTHIAZIDE (ALDACTAZIDE) 25-25 MG TABLET] 1/2 tab      warfarin (COUMADIN) 2.5 MG tablet [WARFARIN (COUMADIN) 2.5 MG TABLET] Adjust dose based on INR results as directed.      pregabalin (LYRICA) 50 MG capsule Take 50 mg by mouth every evening      Allergies   Allergen Reactions    Ace Inhibitors Cough    Empagliflozin Other (See Comments)     weakness    Irbesartan Other (See Comments)     exhaustion    Metformin GI Disturbance     2013.  Retry 2016- gastrointestinal intolerance again    Pioglitazone Other (See Comments)     Hand leg and facial swelling     No complications with prior use GA.       Lab Results    Chemistry/lipid CBC Cardiac Enzymes/BNP/TSH/INR   Lab Results   Component Value Date    BUN 18.5 06/16/2023     06/16/2023    CO2 22 06/16/2023     No results found for: CREATININE    No results found for: CHOL, HDL, LDL, CHOLHDL   No results found for: WBC, HGB, HCT, MCV, PLT Lab Results   Component Value Date    INR 3.2 (H) 08/23/2023        13 minutes spent reviewing prior records (including documentation, laboratory studies, cardiac testing/imaging), history and  physical exam, planning, and subsequent documentation.     This note has been dictated using voice recognition software. Any grammatical, typographical, or context distortions are unintentional and inherent to the software.    Rajani Mcgrath CNP  Clinical Cardiac Electrophysiology  [facility]  Clinic and [phone]  Fax: [phone]  Electrophysiology Nurses: [phone]         Thank you for allowing me to participate in the care of your patient.      Sincerely,     TEODORO MEYER CNP     [facility]  cc:   No referring provider defined for this encounter.

## 2023-09-06 ENCOUNTER — ANESTHESIA (OUTPATIENT)
Dept: CARDIOLOGY | Facility: HOSPITAL | Age: 83
End: 2023-09-06
Payer: COMMERCIAL

## 2023-09-06 ENCOUNTER — ANESTHESIA EVENT (OUTPATIENT)
Dept: CARDIOLOGY | Facility: HOSPITAL | Age: 83
End: 2023-09-06
Payer: COMMERCIAL

## 2023-09-06 ENCOUNTER — HOSPITAL ENCOUNTER (OUTPATIENT)
Facility: HOSPITAL | Age: 83
Discharge: HOME | End: 2023-09-06
Attending: INTERNAL MEDICINE | Admitting: INTERNAL MEDICINE
Payer: COMMERCIAL

## 2023-09-06 VITALS
TEMPERATURE: 97.9 F | HEIGHT: 61 IN | HEART RATE: 78 BPM | RESPIRATION RATE: 25 BRPM | WEIGHT: 144 LBS | SYSTOLIC BLOOD PRESSURE: 127 MMHG | DIASTOLIC BLOOD PRESSURE: 63 MMHG | OXYGEN SATURATION: 94 % | BODY MASS INDEX: 27.19 KG/M2

## 2023-09-06 DIAGNOSIS — I48.19 PERSISTENT ATRIAL FIBRILLATION (H): ICD-10-CM

## 2023-09-06 DIAGNOSIS — I48.4 ATYPICAL ATRIAL FLUTTER (H): Primary | ICD-10-CM

## 2023-09-06 LAB
ACT BLD: 355 SECONDS (ref 74–150)
ACT BLD: 359 SECONDS (ref 74–150)
ACT BLD: 542 SECONDS (ref 74–150)
ANION GAP SERPL CALCULATED.3IONS-SCNC: 14 MMOL/L (ref 7–15)
BUN SERPL-MCNC: 20.2 MG/DL (ref 8–23)
CALCIUM SERPL-MCNC: 9.4 MG/DL (ref 8.8–10.2)
CHLORIDE SERPL-SCNC: 99 MMOL/L (ref 98–107)
CREAT SERPL-MCNC: 0.89 MG/DL (ref 0.51–0.95)
DEPRECATED HCO3 PLAS-SCNC: 21 MMOL/L (ref 22–29)
ERYTHROCYTE [DISTWIDTH] IN BLOOD BY AUTOMATED COUNT: 13.2 % (ref 10–15)
GFR SERPL CREATININE-BSD FRML MDRD: 64 ML/MIN/1.73M2
GLUCOSE BLDC GLUCOMTR-MCNC: 282 MG/DL (ref 70–99)
GLUCOSE SERPL-MCNC: 279 MG/DL (ref 70–99)
HCT VFR BLD AUTO: 38.9 % (ref 35–47)
HGB BLD-MCNC: 13.1 G/DL (ref 11.7–15.7)
INR PPP: 2.57 (ref 0.85–1.15)
MCH RBC QN AUTO: 30.1 PG (ref 26.5–33)
MCHC RBC AUTO-ENTMCNC: 33.7 G/DL (ref 31.5–36.5)
MCV RBC AUTO: 89 FL (ref 78–100)
PLATELET # BLD AUTO: 243 10E3/UL (ref 150–450)
POTASSIUM SERPL-SCNC: 4 MMOL/L (ref 3.4–5.3)
RBC # BLD AUTO: 4.35 10E6/UL (ref 3.8–5.2)
SODIUM SERPL-SCNC: 134 MMOL/L (ref 136–145)
WBC # BLD AUTO: 9.2 10E3/UL (ref 4–11)

## 2023-09-06 PROCEDURE — 93662 INTRACARDIAC ECG (ICE): CPT | Performed by: INTERNAL MEDICINE

## 2023-09-06 PROCEDURE — C1759 CATH, INTRA ECHOCARDIOGRAPHY: HCPCS | Performed by: INTERNAL MEDICINE

## 2023-09-06 PROCEDURE — 85610 PROTHROMBIN TIME: CPT | Performed by: INTERNAL MEDICINE

## 2023-09-06 PROCEDURE — 85347 COAGULATION TIME ACTIVATED: CPT

## 2023-09-06 PROCEDURE — 93462 L HRT CATH TRNSPTL PUNCTURE: CPT | Performed by: INTERNAL MEDICINE

## 2023-09-06 PROCEDURE — C1766 INTRO/SHEATH,STRBLE,NON-PEEL: HCPCS | Performed by: INTERNAL MEDICINE

## 2023-09-06 PROCEDURE — 82435 ASSAY OF BLOOD CHLORIDE: CPT | Performed by: INTERNAL MEDICINE

## 2023-09-06 PROCEDURE — 250N000011 HC RX IP 250 OP 636

## 2023-09-06 PROCEDURE — 250N000009 HC RX 250: Performed by: INTERNAL MEDICINE

## 2023-09-06 PROCEDURE — 93653 COMPRE EP EVAL TX SVT: CPT | Performed by: INTERNAL MEDICINE

## 2023-09-06 PROCEDURE — 370N000017 HC ANESTHESIA TECHNICAL FEE, PER MIN: Performed by: INTERNAL MEDICINE

## 2023-09-06 PROCEDURE — C1733 CATH, EP, OTHR THAN COOL-TIP: HCPCS | Performed by: INTERNAL MEDICINE

## 2023-09-06 PROCEDURE — 250N000011 HC RX IP 250 OP 636: Mod: JZ | Performed by: NURSE ANESTHETIST, CERTIFIED REGISTERED

## 2023-09-06 PROCEDURE — 999N000054 HC STATISTIC EKG NON-CHARGEABLE

## 2023-09-06 PROCEDURE — 93005 ELECTROCARDIOGRAM TRACING: CPT

## 2023-09-06 PROCEDURE — 250N000011 HC RX IP 250 OP 636: Performed by: INTERNAL MEDICINE

## 2023-09-06 PROCEDURE — 85347 COAGULATION TIME ACTIVATED: CPT | Mod: 91

## 2023-09-06 PROCEDURE — 36415 COLL VENOUS BLD VENIPUNCTURE: CPT | Performed by: INTERNAL MEDICINE

## 2023-09-06 PROCEDURE — C1730 CATH, EP, 19 OR FEW ELECT: HCPCS | Performed by: INTERNAL MEDICINE

## 2023-09-06 PROCEDURE — 258N000003 HC RX IP 258 OP 636: Performed by: INTERNAL MEDICINE

## 2023-09-06 PROCEDURE — C1732 CATH, EP, DIAG/ABL, 3D/VECT: HCPCS | Performed by: INTERNAL MEDICINE

## 2023-09-06 PROCEDURE — C1887 CATHETER, GUIDING: HCPCS | Performed by: INTERNAL MEDICINE

## 2023-09-06 PROCEDURE — 250N000009 HC RX 250

## 2023-09-06 PROCEDURE — 82947 ASSAY GLUCOSE BLOOD QUANT: CPT | Performed by: INTERNAL MEDICINE

## 2023-09-06 PROCEDURE — 85027 COMPLETE CBC AUTOMATED: CPT | Performed by: INTERNAL MEDICINE

## 2023-09-06 PROCEDURE — 93010 ELECTROCARDIOGRAM REPORT: CPT | Performed by: INTERNAL MEDICINE

## 2023-09-06 PROCEDURE — C1894 INTRO/SHEATH, NON-LASER: HCPCS | Performed by: INTERNAL MEDICINE

## 2023-09-06 PROCEDURE — 93662 INTRACARDIAC ECG (ICE): CPT | Mod: 26 | Performed by: INTERNAL MEDICINE

## 2023-09-06 PROCEDURE — 710N000010 HC RECOVERY PHASE 1, LEVEL 2, PER MIN

## 2023-09-06 PROCEDURE — 82962 GLUCOSE BLOOD TEST: CPT

## 2023-09-06 PROCEDURE — 272N000001 HC OR GENERAL SUPPLY STERILE: Performed by: INTERNAL MEDICINE

## 2023-09-06 RX ORDER — OXYCODONE HYDROCHLORIDE 5 MG/1
10 TABLET ORAL
Status: DISCONTINUED | OUTPATIENT
Start: 2023-09-06 | End: 2023-09-06 | Stop reason: HOSPADM

## 2023-09-06 RX ORDER — LIDOCAINE 40 MG/G
CREAM TOPICAL
Status: DISCONTINUED | OUTPATIENT
Start: 2023-09-06 | End: 2023-09-06 | Stop reason: HOSPADM

## 2023-09-06 RX ORDER — ONDANSETRON 4 MG/1
4 TABLET, ORALLY DISINTEGRATING ORAL EVERY 30 MIN PRN
Status: DISCONTINUED | OUTPATIENT
Start: 2023-09-06 | End: 2023-09-06 | Stop reason: HOSPADM

## 2023-09-06 RX ORDER — SODIUM CHLORIDE 9 MG/ML
100 INJECTION, SOLUTION INTRAVENOUS CONTINUOUS
Status: DISCONTINUED | OUTPATIENT
Start: 2023-09-06 | End: 2023-09-06 | Stop reason: HOSPADM

## 2023-09-06 RX ORDER — DEXAMETHASONE SODIUM PHOSPHATE 10 MG/ML
INJECTION, SOLUTION INTRAMUSCULAR; INTRAVENOUS PRN
Status: DISCONTINUED | OUTPATIENT
Start: 2023-09-06 | End: 2023-09-06

## 2023-09-06 RX ORDER — FENTANYL CITRATE 50 UG/ML
50 INJECTION, SOLUTION INTRAMUSCULAR; INTRAVENOUS EVERY 5 MIN PRN
Status: DISCONTINUED | OUTPATIENT
Start: 2023-09-06 | End: 2023-09-06 | Stop reason: HOSPADM

## 2023-09-06 RX ORDER — OXYCODONE HYDROCHLORIDE 5 MG/1
5 TABLET ORAL
Status: DISCONTINUED | OUTPATIENT
Start: 2023-09-06 | End: 2023-09-06 | Stop reason: HOSPADM

## 2023-09-06 RX ORDER — PROPOFOL 10 MG/ML
INJECTION, EMULSION INTRAVENOUS PRN
Status: DISCONTINUED | OUTPATIENT
Start: 2023-09-06 | End: 2023-09-06

## 2023-09-06 RX ORDER — SODIUM CHLORIDE, SODIUM LACTATE, POTASSIUM CHLORIDE, CALCIUM CHLORIDE 600; 310; 30; 20 MG/100ML; MG/100ML; MG/100ML; MG/100ML
INJECTION, SOLUTION INTRAVENOUS CONTINUOUS
Status: DISCONTINUED | OUTPATIENT
Start: 2023-09-06 | End: 2023-09-06 | Stop reason: HOSPADM

## 2023-09-06 RX ORDER — ONDANSETRON 2 MG/ML
4 INJECTION INTRAMUSCULAR; INTRAVENOUS EVERY 6 HOURS PRN
Status: DISCONTINUED | OUTPATIENT
Start: 2023-09-06 | End: 2023-09-06 | Stop reason: HOSPADM

## 2023-09-06 RX ORDER — NALOXONE HYDROCHLORIDE 0.4 MG/ML
0.2 INJECTION, SOLUTION INTRAMUSCULAR; INTRAVENOUS; SUBCUTANEOUS
Status: DISCONTINUED | OUTPATIENT
Start: 2023-09-06 | End: 2023-09-06 | Stop reason: HOSPADM

## 2023-09-06 RX ORDER — ONDANSETRON 2 MG/ML
4 INJECTION INTRAMUSCULAR; INTRAVENOUS EVERY 30 MIN PRN
Status: DISCONTINUED | OUTPATIENT
Start: 2023-09-06 | End: 2023-09-06 | Stop reason: HOSPADM

## 2023-09-06 RX ORDER — PROTAMINE SULFATE 10 MG/ML
INJECTION, SOLUTION INTRAVENOUS PRN
Status: DISCONTINUED | OUTPATIENT
Start: 2023-09-06 | End: 2023-09-06

## 2023-09-06 RX ORDER — KETAMINE HYDROCHLORIDE 10 MG/ML
INJECTION INTRAMUSCULAR; INTRAVENOUS PRN
Status: DISCONTINUED | OUTPATIENT
Start: 2023-09-06 | End: 2023-09-06

## 2023-09-06 RX ORDER — FENTANYL CITRATE 50 UG/ML
INJECTION, SOLUTION INTRAMUSCULAR; INTRAVENOUS PRN
Status: DISCONTINUED | OUTPATIENT
Start: 2023-09-06 | End: 2023-09-06

## 2023-09-06 RX ORDER — HYDROMORPHONE HYDROCHLORIDE 1 MG/ML
0.2 INJECTION, SOLUTION INTRAMUSCULAR; INTRAVENOUS; SUBCUTANEOUS EVERY 5 MIN PRN
Status: DISCONTINUED | OUTPATIENT
Start: 2023-09-06 | End: 2023-09-06 | Stop reason: HOSPADM

## 2023-09-06 RX ORDER — HYDROMORPHONE HYDROCHLORIDE 1 MG/ML
0.4 INJECTION, SOLUTION INTRAMUSCULAR; INTRAVENOUS; SUBCUTANEOUS EVERY 5 MIN PRN
Status: DISCONTINUED | OUTPATIENT
Start: 2023-09-06 | End: 2023-09-06 | Stop reason: HOSPADM

## 2023-09-06 RX ORDER — HEPARIN SODIUM 10000 [USP'U]/100ML
INJECTION, SOLUTION INTRAVENOUS CONTINUOUS PRN
Status: DISCONTINUED | OUTPATIENT
Start: 2023-09-06 | End: 2023-09-06 | Stop reason: HOSPADM

## 2023-09-06 RX ORDER — IBUPROFEN 600 MG/1
600 TABLET, FILM COATED ORAL EVERY 6 HOURS PRN
Status: DISCONTINUED | OUTPATIENT
Start: 2023-09-06 | End: 2023-09-06 | Stop reason: HOSPADM

## 2023-09-06 RX ORDER — NALOXONE HYDROCHLORIDE 0.4 MG/ML
0.4 INJECTION, SOLUTION INTRAMUSCULAR; INTRAVENOUS; SUBCUTANEOUS
Status: DISCONTINUED | OUTPATIENT
Start: 2023-09-06 | End: 2023-09-06 | Stop reason: HOSPADM

## 2023-09-06 RX ORDER — HEPARIN SODIUM 1000 [USP'U]/ML
INJECTION, SOLUTION INTRAVENOUS; SUBCUTANEOUS
Status: DISCONTINUED | OUTPATIENT
Start: 2023-09-06 | End: 2023-09-06 | Stop reason: HOSPADM

## 2023-09-06 RX ORDER — OXYCODONE HYDROCHLORIDE 5 MG/1
5 TABLET ORAL EVERY 4 HOURS PRN
Status: DISCONTINUED | OUTPATIENT
Start: 2023-09-06 | End: 2023-09-06 | Stop reason: HOSPADM

## 2023-09-06 RX ORDER — ASPIRIN 81 MG/1
81 TABLET ORAL DAILY
Qty: 28 TABLET | Refills: 0
Start: 2023-09-06 | End: 2023-10-18

## 2023-09-06 RX ORDER — OXYCODONE HYDROCHLORIDE 5 MG/1
10 TABLET ORAL EVERY 4 HOURS PRN
Status: DISCONTINUED | OUTPATIENT
Start: 2023-09-06 | End: 2023-09-06 | Stop reason: HOSPADM

## 2023-09-06 RX ORDER — ACETAMINOPHEN 325 MG/1
650 TABLET ORAL EVERY 4 HOURS PRN
Status: DISCONTINUED | OUTPATIENT
Start: 2023-09-06 | End: 2023-09-06 | Stop reason: HOSPADM

## 2023-09-06 RX ORDER — FENTANYL CITRATE 50 UG/ML
25 INJECTION, SOLUTION INTRAMUSCULAR; INTRAVENOUS EVERY 5 MIN PRN
Status: DISCONTINUED | OUTPATIENT
Start: 2023-09-06 | End: 2023-09-06 | Stop reason: HOSPADM

## 2023-09-06 RX ORDER — ONDANSETRON 2 MG/ML
INJECTION INTRAMUSCULAR; INTRAVENOUS PRN
Status: DISCONTINUED | OUTPATIENT
Start: 2023-09-06 | End: 2023-09-06

## 2023-09-06 RX ORDER — ONDANSETRON 4 MG/1
4 TABLET, ORALLY DISINTEGRATING ORAL EVERY 6 HOURS PRN
Status: DISCONTINUED | OUTPATIENT
Start: 2023-09-06 | End: 2023-09-06 | Stop reason: HOSPADM

## 2023-09-06 RX ADMIN — SODIUM CHLORIDE: 9 INJECTION, SOLUTION INTRAVENOUS at 13:21

## 2023-09-06 RX ADMIN — FENTANYL CITRATE 25 MCG: 50 INJECTION, SOLUTION INTRAMUSCULAR; INTRAVENOUS at 14:39

## 2023-09-06 RX ADMIN — ONDANSETRON 4 MG: 2 INJECTION INTRAMUSCULAR; INTRAVENOUS at 15:11

## 2023-09-06 RX ADMIN — ROCURONIUM BROMIDE 50 MG: 50 INJECTION, SOLUTION INTRAVENOUS at 12:34

## 2023-09-06 RX ADMIN — FENTANYL CITRATE 50 MCG: 50 INJECTION, SOLUTION INTRAMUSCULAR; INTRAVENOUS at 12:24

## 2023-09-06 RX ADMIN — PROPOFOL 100 MG: 10 INJECTION, EMULSION INTRAVENOUS at 12:32

## 2023-09-06 RX ADMIN — PROTAMINE SULFATE 75 MG: 10 INJECTION, SOLUTION INTRAVENOUS at 15:21

## 2023-09-06 RX ADMIN — FENTANYL CITRATE 25 MCG: 50 INJECTION, SOLUTION INTRAMUSCULAR; INTRAVENOUS at 13:55

## 2023-09-06 RX ADMIN — KETAMINE HYDROCHLORIDE 30 MG: 10 INJECTION INTRAMUSCULAR; INTRAVENOUS at 12:33

## 2023-09-06 RX ADMIN — ROCURONIUM BROMIDE 20 MG: 50 INJECTION, SOLUTION INTRAVENOUS at 14:02

## 2023-09-06 RX ADMIN — DEXAMETHASONE SODIUM PHOSPHATE 4 MG: 10 INJECTION, SOLUTION INTRAMUSCULAR; INTRAVENOUS at 12:38

## 2023-09-06 RX ADMIN — SODIUM CHLORIDE 100 ML/HR: 9 INJECTION, SOLUTION INTRAVENOUS at 10:30

## 2023-09-06 RX ADMIN — ROCURONIUM BROMIDE 20 MG: 50 INJECTION, SOLUTION INTRAVENOUS at 13:16

## 2023-09-06 ASSESSMENT — ACTIVITIES OF DAILY LIVING (ADL)
ADLS_ACUITY_SCORE: 35

## 2023-09-06 ASSESSMENT — ENCOUNTER SYMPTOMS: DYSRHYTHMIAS: 1

## 2023-09-06 NOTE — ANESTHESIA CARE TRANSFER NOTE
Patient: Yvonne Fatima    Procedure: Procedure(s):  Ablation Atrial Fibrillation       Diagnosis: Atrial Fibrillation  Diagnosis Additional Information: No value filed.    Anesthesia Type:   General     Note:    Oropharynx: oropharynx clear of all foreign objects and spontaneously breathing  Level of Consciousness: awake  Oxygen Supplementation: room air    Independent Airway: airway patency satisfactory and stable  Dentition: dentition unchanged  Vital Signs Stable: post-procedure vital signs reviewed and stable  Report to RN Given: handoff report given  Patient transferred to: Cardiac Special Care  Comments: .  Protamine given upon arrival to Cardiac Recovery area. Verbal order received from surgeon. Patient is mostly awake and alert. Denies pain. Denies nausea.  VSS. Tolerating room air.   .          Vitals:  Vitals Value Taken Time   /61 09/06/23 1530   Temp 36.6  C (97.9  F) 09/06/23 1519   Pulse 68 09/06/23 1531   Resp 16 09/06/23 1531   SpO2 100 % 09/06/23 1531   Vitals shown include unvalidated device data.    Electronically Signed By: TEODORO Cruz CRNA  September 6, 2023  3:32 PM

## 2023-09-06 NOTE — INTERVAL H&P NOTE
"I have reviewed the surgical (or preoperative) H&P that is linked to this encounter, and examined the patient. There are no significant changes    Clinical Conditions Present on Arrival:  Clinically Significant Risk Factors Present on Admission                # Drug Induced Coagulation Defect: home medication list includes an anticoagulant medication   # Overweight: Estimated body mass index is 27.21 kg/m  as calculated from the following:    Height as of this encounter: 1.549 m (5' 1\").    Weight as of this encounter: 65.3 kg (144 lb).       "

## 2023-09-06 NOTE — ANESTHESIA PROCEDURE NOTES
Airway       Patient location during procedure: OR       Procedure Start/Stop Times: 9/6/2023 12:35 PM  Staff -        CRNA: Brian Luther APRN CRNA       Performed By: CRNA  Consent for Airway        Urgency: elective  Indications and Patient Condition       Indications for airway management: jermaine-procedural       Induction type:intravenous       Mask difficulty assessment: 1 - vent by mask    Final Airway Details       Final airway type: endotracheal airway       Successful airway: ETT - single  Endotracheal Airway Details        ETT size (mm): 7.0       Cuffed: yes       Successful intubation technique: video laryngoscopy       VL Blade Size: Glidescope 3       Grade View of Cords: 1       Adjucts: stylet       Position: Right       Measured from: gums/teeth       Secured at (cm): 20       Bite block used: None    Post intubation assessment        Placement verified by: capnometry, equal breath sounds and chest rise        Number of attempts at approach: 1       Number of other approaches attempted: 0       Secured with: silk tape       Ease of procedure: easy       Dentition: Intact and Unchanged    Medication(s) Administered   Medication Administration Time: 9/6/2023 12:35 PM

## 2023-09-06 NOTE — Clinical Note
Potential access sites were evaluated for patency using ultrasound.   The right femoral vein and left femoral vein were selected. Access was obtained under with Sonosite guidance using a micropuncture 21 gauge needle with direct visualization of needle entry.

## 2023-09-06 NOTE — Clinical Note
Grounding pads are removed from the patient. The skin is clean, dry, intact, and free from redness.  DISPLAY PLAN FREE TEXT DISPLAY PLAN FREE TEXT DISPLAY PLAN FREE TEXT DISPLAY PLAN FREE TEXT DISPLAY PLAN FREE TEXT DISPLAY PLAN FREE TEXT DISPLAY PLAN FREE TEXT

## 2023-09-06 NOTE — ANESTHESIA POSTPROCEDURE EVALUATION
Patient: Yvonne Fatima    Procedure: Procedure(s):  Ablation Atrial Fibrillation       Anesthesia Type:  General    Note:  Disposition: Inpatient   Postop Pain Control: Uneventful            Sign Out: Well controlled pain   PONV: No   Neuro/Psych: Uneventful            Sign Out: Acceptable/Baseline neuro status   Airway/Respiratory: Uneventful            Sign Out: Acceptable/Baseline resp. status   CV/Hemodynamics: Uneventful            Sign Out: Acceptable CV status; No obvious hypovolemia; No obvious fluid overload   Other NRE: NONE   DID A NON-ROUTINE EVENT OCCUR? No           Last vitals:  Vitals:    09/06/23 1630 09/06/23 1645 09/06/23 1700   BP: 134/66 124/57 125/64   Pulse: 74 74 72   Resp: 21 18 19   Temp:      SpO2: 93% 92% 92%       Electronically Signed By: Spenser Peguero MD  September 6, 2023  5:09 PM

## 2023-09-06 NOTE — Clinical Note
[employer] Med system 12 lead EKG, hemodynamics 5 lead, pulse oximetery, NIBP, Physiocontrol hands off defibrillator/external pacer, with 3 monitoring leads to patient. Baseline assessment done.

## 2023-09-06 NOTE — ANESTHESIA PREPROCEDURE EVALUATION
Anesthesia Pre-Procedure Evaluation    Patient: Yvonne Fatima   MRN: [id number] : 1940        Procedure : Procedure(s):  Ablation Atrial Fibrillation          Past Medical History:   Diagnosis Date    Atrial fibrillation (H)     [id number] score of 7-on chronic warfarin Previously failed Sotalol and Multaq therapy PVI 3/11 Repeat PVI   Recurrent A fib, most recent CV 2013 Rx sotalol     Atrial flutter (H)     Atypical flutter with RVR after second PVI in 2011      Esophageal reflux     Created by Conversion     Essential hypertension          Status post catheter ablation of atrial fibrillation 6/3/2019    PVI 3/11(PVI/Cryo + CTI line) Repeat PVI  (PVI/RF + CFE + Roof line + GREGORIO line)      Past Surgical History:   Procedure Laterality Date    HC REMOVE TONSILS/ADENOIDS,<13 Y/O      Description: Tonsillectomy With Adenoidectomy;  Recorded: 10/17/2012;    HC REVISE MEDIAN N/CARPAL TUNNEL SURG      Description: Neuroplasty Decompression Median Nerve At Carpal Tunnel;  Recorded: 10/17/2012;    FL ABLATE HEART DYSRHYTHM FOCUS      Description: Catheter Ablation Atrial Fibrillation;  Recorded: 2013;  Comments: PVI 3-7-2011 (PVI/cryo + CTI line); ; Repeat PVI 2011 (PVI/RF + CFE + Roof line + GREGORIO line)    FL ABLATE HEART DYSRHYTHM FOCUS      Description: Catheter Ablation Atrial Fibrillation;  Recorded: 2014;  Comments: PVI 3-7-2011 (PVI/cryo + CTI line); ; Repeat PVI 2011 (PVI/RF + CFE + Roof line + GREGORIO line)    FL CARDIOVERSION ELECTIVE ARRHYTHMIA EXTERNAL       Fib 10/17/12; ; Flutter 2/15/13; AFl 2019      Allergies   Allergen Reactions    Ace Inhibitors Cough    Empagliflozin Other (See Comments)     weakness    Irbesartan Other (See Comments)     exhaustion    Metformin GI Disturbance     .  Retry 2016- gastrointestinal intolerance again    Pioglitazone Other (See Comments)     Hand leg and facial swelling      Social History     Tobacco Use    Smoking  status: Former     Types: Cigarettes     Quit date: 1962     Years since quittin.4    Smokeless tobacco: Never   Substance Use Topics    Alcohol use: Yes     Comment: Alcoholic Drinks/day: 6 per month      Wt Readings from Last 1 Encounters:   23 65.3 kg (144 lb)        Anesthesia Evaluation   Pt has had prior anesthetic.         ROS/MED HX  ENT/Pulmonary:     (+) sleep apnea, uses CPAP,                                     Neurologic:  - neg neurologic ROS     Cardiovascular: Comment: Echo  Interpretation Summary    The left ventricle is normal in size.  Left ventricular function is normal.The ejection fraction is 60-65%.  Left ventricular diastolic function is abnormal.  The right ventricle is mildly dilated.  The left atrium is moderately dilated. The right atrium is moderately dilated.  There is severe mitral annular calcification.  There is mild mitral stenosis.  There is moderate (2+) tricuspid regurgitation.  Right ventricular systolic pressure is elevated, consistent with mild  pulmonary hypertension.       (+)  hypertension- -   -  - -                        dysrhythmias, a-fib,             METS/Exercise Tolerance: >4 METS    Hematologic:  - neg hematologic  ROS     Musculoskeletal:  - neg musculoskeletal ROS     GI/Hepatic:     (+) GERD,                   Renal/Genitourinary:     (+) renal disease, type: CRI,            Endo:     (+)  type II DM,                    Psychiatric/Substance Use:  - neg psychiatric ROS     Infectious Disease:  - neg infectious disease ROS     Malignancy:  - neg malignancy ROS     Other:  - neg other ROS          Physical Exam    Airway        Mallampati: II   TM distance: > 3 FB   Neck ROM: full   Mouth opening: > 3 cm    Respiratory Devices and Support         Dental  no notable dental history     (+) Minor Abnormalities - some fillings, tiny chips      Cardiovascular   cardiovascular exam normal          Pulmonary   pulmonary exam normal                OUTSIDE  LABS:  CBC:   Lab Results   Component Value Date    WBC 7.9 08/31/2023    HGB 13.4 08/31/2023    HCT 40.3 08/31/2023     08/31/2023     BMP:   Lab Results   Component Value Date     08/31/2023     06/16/2023    POTASSIUM 4.2 08/31/2023    POTASSIUM 4.1 06/16/2023    CHLORIDE 103 08/31/2023    CHLORIDE 99 06/16/2023    CO2 23 08/31/2023    CO2 22 06/16/2023    BUN 18.3 08/31/2023    BUN 18.5 06/16/2023    CR 0.84 08/31/2023    CR 0.9 07/30/2023     (H) 08/31/2023     (H) 06/16/2023     COAGS:   Lab Results   Component Value Date    INR 3.2 (H) 08/23/2023     POC: No results found for: BGM, HCG, HCGS  HEPATIC: No results found for: ALBUMIN, PROTTOTAL, ALT, AST, GGT, ALKPHOS, BILITOTAL, BILIDIRECT, ABIMAEL  OTHER:   Lab Results   Component Value Date    HOLLIS 9.5 08/31/2023       Anesthesia Plan    ASA Status:  3    NPO Status:  NPO Appropriate    Anesthesia Type: General.     - Airway: ETT   Induction: Intravenous, Propofol.   Maintenance: Balanced.        Consents    Anesthesia Plan(s) and associated risks, benefits, and realistic alternatives discussed. Questions answered and patient/representative(s) expressed understanding.     - Discussed: Risks, Benefits and Alternatives for BOTH SEDATION and the PROCEDURE were discussed     - Discussed with:  Patient       - Patient is DNR/DNI Status: No          Postoperative Care    Pain management: IV analgesics, Oral pain medications.   PONV prophylaxis: Ondansetron (or other 5HT-3), Dexamethasone or Solumedrol     Comments:    Other Comments: GETA  Zofran for PONV            Spenser Peguero MD

## 2023-09-07 ENCOUNTER — ANTICOAGULATION THERAPY VISIT (OUTPATIENT)
Dept: ANTICOAGULATION | Facility: CLINIC | Age: 83
End: 2023-09-07
Payer: COMMERCIAL

## 2023-09-07 DIAGNOSIS — I48.19 PERSISTENT ATRIAL FIBRILLATION (H): Primary | ICD-10-CM

## 2023-09-07 NOTE — PROGRESS NOTES
ANTICOAGULATION MANAGEMENT     Yvonne Fatima 83 year old female is on warfarin with therapeutic INR result. (Goal INR 2.0-3.0)    Recent labs: (last 7 days)     09/06/23  1028   INR 2.57*       ASSESSMENT     Source(s): Chart Review and Patient/Caregiver Call     Warfarin doses taken: Warfarin taken as instructed  Diet: No new diet changes identified  Medication/supplement changes: None noted  New illness, injury, or hospitalization: Yes: had PVI ablation yesterday - patient states this went well  Signs or symptoms of bleeding or clotting: No  Previous result: Supratherapeutic  Additional findings: Recent Pulmonary Vein Isolation (PVI) with 12 weeks; Hx of stable INR warfarin dose: INR 1 week post procedure; if therapeutic resume routine monitoring. Notify cardiologist and EP pool if INR <= 1.7 within 4 weeks, procedure/surgery hold requested, or non-compliance with monitoring > 1 week.       PLAN     Recommended plan for temporary change(s) affecting INR     Dosing Instructions: Continue your current warfarin dose with next INR in 1 week       Summary  As of 9/7/2023      Full warfarin instructions:  1.25 mg every Mon, Wed, Fri; 2.5 mg all other days   Next INR check:  9/14/2023               Telephone call with Ellyn who verbalizes understanding and agrees to plan    Patient offered & declined to schedule next visit    Education provided:   Goal range and lab monitoring: goal range and significance of current result    Plan made per [facility] anticoagulation protocol    Tatyana Mclean RN  Anticoagulation Clinic  9/7/2023    _______________________________________________________________________     Anticoagulation Episode Summary       Current INR goal:  2.0-3.0   TTR:  57.2 % (1.1 mo)   Target end date:  10/4/2023   Send INR reminders to:  [facility]    Indications    Persistent atrial fibrillation (H) [I48.19]             Comments:               Anticoagulation Care Providers        Provider Role Specialty Phone number    Timur Corona MD Referring Cardiovascular Disease [phone]

## 2023-09-07 NOTE — PROGRESS NOTES
Karina Maguire, RN  [facility]; Timur Corona MD; [facility] Ep Support Pool - Lhe2 hours ago (3:04 PM)     JL  Sounds good!  Thanks Tatyana for the head up  Vicky     Timur Marks MD; [facility] Ep Support Pool - Lhe2 hours ago (2:56 PM)     Ellyn Bellamy had PVI ablation yesterday. Patient would like to have INR management transferred back to her primary clinic as soon as possible. Per new [facility] protocol, she will check an INR next week. If therapeutic, okay to return INR management to her PCP? She was very stable on current dose and 6 week checks prior to PVI prep.    Thank you,    Tatyana Mclean, AGUS

## 2023-09-07 NOTE — DISCHARGE INSTRUCTIONS
Electrophysiology  Discharge Instructions for Pulmonary Vein Ablation for Atrial Fibrillation    Once Home:  Do not stop your aspirin or anticoagulation unless directed by a [facility] Cardiologist.  Increase fluid intake for two days.  Continue to use Incentive Spirometer every 2 hours while awake over the next 1-2 days, then throw away.  No aggressive exercise for one week.  No lifting more than 10 pounds for one week.  No driving for 5 days.  Return to work in 5 days unless otherwise directed.  May shower day after procedure.  No tub bath, swimming or hot tub for 7-10 days until incisions are healed.  OK to use ice packs over groin sites, 20 minutes on 20 minutes off for groin discomfort.    Contact the [facility] at [phone] with questions or if you experience any of the following:    Episodes of Atrial Fibrillation lasting greater than 4 hours  If you develop shortness of breath, dizziness, or chest pains  If you develop any redness, swelling, drainage or bleeding at the puncture sites  If you develop a temperature greater than 100.5 degrees (especially weeks 2-5 post procedure)    Contact 911 should you experience any symptoms of a stroke such as:    Difficulty with your speech  Problems walking or with your balance  Problems with your vision  Side of your face droops or feels numb  Muscle weakness on one side of your body    Chest pain is common in the first 3-4 days after your surgery and should resolve completely. Contact your Cardiologist/Electrophysiologist right away if chest pain returns after the first week.    Your Procedural Physician was: Dr. Timur Corona   To reach the Electrophysiology Registered Nurses working with Dr Corona please call [phone].      [facility]:  [phone]  If you are calling after hours, please listen to the entire voicemail, a live  will answer at the end of the message

## 2023-09-08 ENCOUNTER — VIRTUAL VISIT (OUTPATIENT)
Dept: CARDIOLOGY | Facility: CLINIC | Age: 83
End: 2023-09-08
Payer: COMMERCIAL

## 2023-09-08 DIAGNOSIS — Z86.79 STATUS POST ABLATION OF ATRIAL FIBRILLATION: Primary | ICD-10-CM

## 2023-09-08 DIAGNOSIS — Z98.890 STATUS POST ABLATION OF ATRIAL FIBRILLATION: Primary | ICD-10-CM

## 2023-09-08 LAB
ATRIAL RATE - MUSE: 83 BPM
DIASTOLIC BLOOD PRESSURE - MUSE: NORMAL MMHG
INTERPRETATION ECG - MUSE: NORMAL
P AXIS - MUSE: 41 DEGREES
PR INTERVAL - MUSE: 236 MS
QRS DURATION - MUSE: 94 MS
QT - MUSE: 426 MS
QTC - MUSE: 500 MS
R AXIS - MUSE: 19 DEGREES
SYSTOLIC BLOOD PRESSURE - MUSE: NORMAL MMHG
T AXIS - MUSE: 27 DEGREES
VENTRICULAR RATE- MUSE: 83 BPM

## 2023-09-08 PROCEDURE — 99207 PR NO CHARGE NURSE ONLY: CPT

## 2023-09-08 NOTE — PROGRESS NOTES
Post PVI Procedural Follow Up Call    Pt is s/p PVI from 9/6/23 with Dr Corona  PC was placed to pt, spoke to pt    General Assessment:     Weight: Pt reports pt is unable to report weight, but denies s/s of fluid retention    Pain: Pt denies generalized or localized pain abnormal to healing s/p     /GI: Pt denies difficulty swallowing, denies constipation, denies urinary retention/difficulty, reports no s/s of infection, report normal appetite, and reports staying hydrated.    Respiratory: Pt denies SOB, denies difficulty breathing, denies throat pain, denies changes/abnormal sputum, and denies any further symptoms abnormal to normal healing process s/p PVI.    Activity: Pt is tolerating advancement in activity while following physical restrictions, staying well hydrated, and gradually working into baseline activity.     Rhythm Assessment:   Pt denies palpitations, denies irregularities in HR or rhythm, and denies symptoms or sustained AF episodes.    Procedure Site Assessment:   Pts no visible/physical changes in groin sites, some bruising around sites without significant change from hospital discharge and normal to PVI recovery, and small pea/dime size hardening under suture sites normal to PVI recovery    Anticoagulation/Medication:  Pt remain on Coumadin/warfarin without interruption  Pt confirms taking ASA 81mg Daily, and will continue taking this for 1 mo s/p    Education completed with pt at this visit:  Reviewed normal post-op PVI healing process, when to contact EP-RN/ANABELLE-MD, contact information was given to the pt for further concerns or questions, and pt verbalized understanding    Follow up  Pts AVS was printed and mailed to pt by scheduling team and pt will be seen by EP NP at 6 wks, monitor will be ordered at this follow-up if indicated as well as 3mo follow-up with either ANABELLE COLLADO or ANABELLE AMANDA    9/8/2023 9:01 AM  Karina Maguire RN

## 2023-09-08 NOTE — PATIENT INSTRUCTIONS
Your anticoagulation medication Coumadin:  It is important to remain on your anticoagulation medication uninterrupted after your ablation to reduce your risk of a stroke or heart attack, do not stop this medication  Please remain on your aspirin for 1 month, then you can stop    Healing from your pulmonary vein ablation:  Stay well hydrated, and increase your fluid intake during this recovery period  High protein foods aide in your bodies healing process  No aggressive or aerobic activity for 7-10 days, and do not lift more than 25 pounds for 7 days   Increase your activity gradually over the next 5-10 days, working back to your normal daily activity  If you are experiencing pain at your groin sites from the procedure, we advise applying ice for 20 minute durations 3-4 times per day     Please call me if any of the following occur:  Episodes of Atrial Fibrillation lasting greater than 4 hours, or if you notice the episodes are increasing in frequency or duration  If you develop shortness of breath, dizziness, or unresolving chest pains   Changes at your groin sites including swelling, hardening, drainage, increase in bruising, or an increase in pain  If you develop a temperature greater than 100.5 degrees (especially weeks 2-5 post   Procedure)    Call 911 if you are having symptoms of a stroke; difficulty with your speech, problems walking, difficulty with balance, vision disturbances, facial drooping or numbness, and muscle weakness on one side of your body     Your follow up appointments are as follows:  You will be seen by the electrophysiologist nurse practitioner at 6 weeks after your ablation  At your 6 week appointment, a 3 month follow-up appointment will be arranged with either the Nurse Practitioner or the Electrophysiology provider     Sincerely,  Karina Maguire RN [phone]    After hours please contact the on call service at # [phone]

## 2023-09-13 ENCOUNTER — TELEPHONE (OUTPATIENT)
Dept: CARDIOLOGY | Facility: CLINIC | Age: 83
End: 2023-09-13
Payer: COMMERCIAL

## 2023-09-13 NOTE — TELEPHONE ENCOUNTER
Return call to patient.  She states she has noted a fast heart rate this afternoon that is associated with feeling kind of shaky.  She is s/p PVI with SWA on 9-6-23.    She states her heart rates have been in the 110's for about one hour.      Reviewed normal healing process post ablation.  Instructed her to drink extra water, and rest the remainder of the day.  Explained she will likely go back to SR on her own but will call her tomorrow and assess her status.  She has missed no doses of blood thinner, she is on no antiarrythmic medications.    She states understanding, reviewed contact information for additional concerns.

## 2023-09-14 NOTE — TELEPHONE ENCOUNTER
Phone call to patient, she states she converted on her own yesterday evening and continues to be in SR this morning.  She states she is feeling a little bit weak and is going to continue to take it easy today.   Encouraged her to rest and keep hydrated and eat well.  Reviewed contact information for any additional concerns.

## 2023-09-15 LAB — INR (EXTERNAL): 2.6 (ref 0.9–1.1)

## 2023-09-18 ENCOUNTER — TELEPHONE (OUTPATIENT)
Dept: ANTICOAGULATION | Facility: CLINIC | Age: 83
End: 2023-09-18
Payer: COMMERCIAL

## 2023-09-18 DIAGNOSIS — I48.19 PERSISTENT ATRIAL FIBRILLATION (H): Primary | ICD-10-CM

## 2023-09-18 NOTE — TELEPHONE ENCOUNTER
ANTICOAGULATION     Yvonne Fatima is overdue for INR check.       Patient had INR done with her PCP 9/15. INR was therapeutic at 2.6, result entered into anticoag calendar. She will resume management with PCP going forward. Received an okay from cardiology for this last week. Will discharge from [facility]    Tatyana Mclean RN

## 2023-09-18 NOTE — TELEPHONE ENCOUNTER
ANTICOAGULATION  MANAGEMENT    Yvonne Fatima is being discharged from the [facility] Anticoagulation Management Program ([facility]).    Reason for discharge: care has been transferred to PCP at [facility]    Anticoagulation episode resolved, [facility] referral closed, and Standing order discontinued    If patient needs warfarin management in the future, please send a new referral    Tatyana Mclean RN

## 2023-10-17 NOTE — PROGRESS NOTES
[facility]  Cardiac Electrophysiology  [address]  [place] [address]   Office: [phone]  Fax: [phone]     HEART CARE ELECTROPHYSIOLOGY FOLLOW UP    Primary Care: Juan Mcmanus MD    Assessment/Recommendations     Persistent atrial fibrillation, atrial flutter, focal atrial tachycardia: Status post catheter ablation of focal atrial tachycardia 9/6/2023; baseline isolation of PVI, roofline, GREGORIO line and CFE, with noted severely reduced LA voltage. She has had no complications subsequent to catheter ablation procedure.  Brief episode elevated heart rates 1 week after ablation, no recurrent symptomatology or documentation of recurrent arrhythmia since that time.  Discussed decreased likelihood of sustained sinus rhythm.    [id number] score of 5 for age >75, female gender, hypertension and type 2 diabetes. No bleeding complications. INRs therapeutic  -Continue warfarin as ordered for stroke prophylaxis    Essential hypertension: Well managed on current regimen    SHAVON: Consistent use of CPAP    Follow up: with Dr. Corona 6 weeks       History of Present Illness/Subjective    Yvonne Fatima is a 83 year old female who is seen today for follow up status post atrial fibrillation ablation. She has a past medical history significant for essential hypertension, SHAVON, type 2 diabetes.  She underwent RF ablation of induced lateral left atrial atrial tachycardia 9/6/2023, with baseline isolation of PVI, roofline, GREGORIO line and CFE, with noted severely reduced LA voltage.  Her sotalol was discontinued following procedure. 1 week after ablation she had an episode of elevated heart rates 110s lasting several hours, converting to sinus rhythm spontaneously.  She is on no AV ricardo blocking agents.  She has had no recurrent symptoms of racing heart, lightheadedness/dizziness or weakness.  She denies changes in activity tolerance, chest discomfort, palpitations or dyspnea.   She has resumed all activity as prior to ablation. She denies groin site issues, heartburn, difficulty swallowing, or neurologic changes.      Data Review     Arrhythmia hx:   Dx/date: Unknown date of diagnosis.  Catheter ablation March 2011 as below, repeat ablation July 2011.  Development of atypical atrial flutter 2013, requiring DCCV with prior sotalol initiation.  Multiple recurrences since that time requiring cardioversion in 2019 and 2021.  Symptomatic recurrence June 2023, DCCV 6/21/2023.  Status post catheter ablation 9/6/2023 as below.  Sx: Lightheadedness, weakness, general malaise  Prior AAD, AV ricardo blocking agents: Sotalol, Multaq  Ablation hx  March 2011-PVI (cryo), CTI line  July 2011-PVI (RF), CFE, roofline and GREGORIO line  9/6/2023, Dr. Corona-focal AT (lateral left atrium); baseline isolation of PVI, roofline, GREGORIO line and CFV.  Severely reduced LA voltage, viable myocardium <5%.    EKG:   10/18/2023: SR 85 bpm, QRS 84 ms, QT/QTc 372/442 ms  9/6/2023: SR 83 bpm, QRS 94 ms, QT/QTc 426/500 ms  8/31/2023: Sinus rhythm 66 bpm, AV delay  ms, QRS 94 ms, QT/QTc 432/452 ms  6/21/2023: Sinus rhythm 50 bpm, brief second-degree AVB type II,  ms, QT/QTc 488/444 ms  6/16/2023: Atypical atrial flutter 105 bpm, QRS 84 ms, QT/QTc 370/489 ms  Personally reviewed.      TTE: 8/7/2023  The left ventricle is normal in size.  Left ventricular function is normal.The ejection fraction is 60-65%.  Left ventricular diastolic function is abnormal.  The right ventricle is mildly dilated.  The left atrium is moderately dilated. The right atrium is moderately dilated.  There is severe mitral annular calcification.  There is mild mitral stenosis.  There is moderate (2+) tricuspid regurgitation.  Right ventricular systolic pressure is elevated, consistent with mild  pulmonary hypertension.    I have reviewed and updated the patient's past medical history, allergy list and medication list.                Physical  "Examination Review of Systems   Vitals: /68 (BP Location: Left arm, Patient Position: Sitting, Cuff Size: Adult Large)   Pulse 93   Resp 16   Ht 1.549 m (5' 1\")   Wt 66.2 kg (146 lb)   BMI 27.59 kg/m      BMI= Body mass index is 27.59 kg/m .    Wt Readings from Last 3 Encounters:   10/18/23 66.2 kg (146 lb)   09/06/23 65.3 kg (144 lb)   08/31/23 65.3 kg (144 lb)       General   Appearance:   Alert and oriented, in no acute distress.    HEENT:  Normocephalic and atraumatic. Conjunctiva and sclera are clear. Moist oral mucosa.    Neck: No JVP, carotid bruit or obvious thyromegaly.   Lungs:   Respirations unlabored. Clear bilaterally with no rales, rhonchi, or wheezes.     Cardiovascular:   Rhythm is regular. S1 and S2 are normal. No significant murmur is present. Lower extremities demonstrate no significant edema. Posterior tibial pulses are intact bilaterally.   Extremities: No cyanosis or clubbing   Skin: Skin is warm, dry, and otherwise intact.   Neurologic: Gait not assessed. Mood and affect appropriate.    A 12 point comprehensive review of systems was  negative except as noted.      Medical History  Surgical History Family History Social History   Past Medical History:   Diagnosis Date    Atrial fibrillation (H)     [id number] score of 7-on chronic warfarin Previously failed Sotalol and Multaq therapy PVI 3/11 Repeat PVI 7/11  Recurrent A fib, most recent CV Feb 2013 Rx sotalol     Atrial flutter (H)     Atypical flutter with RVR after second PVI in July 2011      Esophageal reflux     Created by Conversion     Essential hypertension          Status post catheter ablation of atrial fibrillation 6/3/2019    PVI 3/11(PVI/Cryo + CTI line) Repeat PVI 7/11 (PVI/RF + CFE + Roof line + GREGORIO line)    Past Surgical History:   Procedure Laterality Date    EP ABLATION PULMONARY VEIN ISOLATION N/A 9/6/2023    Procedure: Ablation Atrial Fibrillation;  Surgeon: Timur Corona MD;  Location: [facility] CATH LAB CV    " HC REMOVE TONSILS/ADENOIDS,<13 Y/O      Description: Tonsillectomy With Adenoidectomy;  Recorded: 10/17/2012;    HC REVISE MEDIAN N/CARPAL TUNNEL SURG      Description: Neuroplasty Decompression Median Nerve At Carpal Tunnel;  Recorded: 10/17/2012;    NH ABLATE HEART DYSRHYTHM FOCUS      Description: Catheter Ablation Atrial Fibrillation;  Recorded: 2013;  Comments: PVI 3-7-2011 (PVI/cryo + CTI line); ; Repeat PVI 2011 (PVI/RF + CFE + Roof line + GREGORIO line)    NH ABLATE HEART DYSRHYTHM FOCUS      Description: Catheter Ablation Atrial Fibrillation;  Recorded: 2014;  Comments: PVI 3-7-2011 (PVI/cryo + CTI line); ; Repeat PVI 2011 (PVI/RF + CFE + Roof line + GREGORIO line)    NH CARDIOVERSION ELECTIVE ARRHYTHMIA EXTERNAL       Fib 10/17/12; ; Flutter 2/15/13; AFl 2019    Family History   Problem Relation Age of Onset    Aneurysm Mother         brain    Emphysema Father     Lung Cancer Sister     Hypertension Brother     Lung Cancer Sister     Lung Cancer Sister     Breast Cancer Sister     Social History     Socioeconomic History    Marital status:      Spouse name: Not on file    Number of children: 3    Years of education: Not on file    Highest education level: Not on file   Occupational History    Not on file   Tobacco Use    Smoking status: Former     Types: Cigarettes     Quit date: 1962     Years since quittin.5    Smokeless tobacco: Never   Substance and Sexual Activity    Alcohol use: Yes     Comment: Alcoholic Drinks/day: 6 per month    Drug use: No    Sexual activity: Yes   Other Topics Concern    Not on file   Social History Narrative    Not on file     Social Determinants of Health     Financial Resource Strain: Not on file   Food Insecurity: Not on file   Transportation Needs: Not on file   Physical Activity: Not on file   Stress: Not on file   Social Connections: Not on file   Interpersonal Safety: Not on file   Housing Stability: Not on file          Medications   Allergies   Scheduled Meds:  Current Outpatient Medications   Medication Sig Dispense Refill    allopurinol (ZYLOPRIM) 100 MG tablet [ALLOPURINOL (ZYLOPRIM) 100 MG TABLET] Take 100 mg by mouth 2 (two) times a day.             aspirin 81 MG EC tablet Take 1 tablet (81 mg) by mouth daily 28 tablet 0    atorvastatin (LIPITOR) 40 MG tablet Take 1 tablet by mouth daily      BD SUELLEN U/F 32G X 4 MM insulin pen needle       gabapentin (NEURONTIN) 100 MG capsule Take 100 mg by mouth 2 times daily      glipiZIDE (GLUCOTROL) 10 MG tablet Take 20 mg by mouth daily       Lancets (ONETOUCH DELICA PLUS [id number]) MISC Test once daily varying time,      ONETOUCH VERIO IQ test strip Test 3 times a day.      potassium chloride (KLOR-CON) 10 MEQ CR tablet [POTASSIUM CHLORIDE (KLOR-CON) 10 MEQ CR TABLET] Take 20 mEq by mouth 2 (two) times a day.      SOLIQUA pen Inject 50 Units Subcutaneous every morning (before breakfast)      spironolactone-hydrochlorothiazide (ALDACTAZIDE) 25-25 mg tablet Take by mouth daily Patient takes one-half tablet daily.      warfarin (COUMADIN) 2.5 MG tablet [WARFARIN (COUMADIN) 2.5 MG TABLET] Adjust dose based on INR results as directed.      pregabalin (LYRICA) 50 MG capsule Take 50 mg by mouth every evening      Allergies   Allergen Reactions    Ace Inhibitors Cough    Empagliflozin Other (See Comments)     weakness    Irbesartan Other (See Comments)     exhaustion    Metformin GI Disturbance     2013.  Retry 2016- gastrointestinal intolerance again    Pioglitazone Other (See Comments)     Hand leg and facial swelling         Lab Results    Chemistry/lipid CBC Cardiac Enzymes/BNP/TSH/INR   Lab Results   Component Value Date    BUN 20.2 09/06/2023     (L) 09/06/2023    CO2 21 (L) 09/06/2023    Lab Results   Component Value Date    WBC 9.2 09/06/2023    HGB 13.1 09/06/2023    HCT 38.9 09/06/2023    MCV 89 09/06/2023     09/06/2023    @RESUFAST(BMP,CBC,BNP,TSH,  INR)@      31 minutes spent  reviewing prior records (including documentation, laboratory studies, cardiac testing/imaging), history and physical exam, planning, and subsequent documentation.     This note has been dictated using voice recognition software. Any grammatical, typographical, or context distortions are unintentional and inherent to the software.    Rajani Mcgrath, CNP  Clinical Cardiac Electrophysiology  [facility]  Clinic and [phone]  Fax: [phone]  Electrophysiology Nurses: [phone]

## 2023-10-18 ENCOUNTER — OFFICE VISIT (OUTPATIENT)
Dept: CARDIOLOGY | Facility: CLINIC | Age: 83
End: 2023-10-18
Payer: COMMERCIAL

## 2023-10-18 VITALS
RESPIRATION RATE: 16 BRPM | DIASTOLIC BLOOD PRESSURE: 68 MMHG | BODY MASS INDEX: 27.56 KG/M2 | HEIGHT: 61 IN | WEIGHT: 146 LBS | HEART RATE: 93 BPM | SYSTOLIC BLOOD PRESSURE: 132 MMHG

## 2023-10-18 DIAGNOSIS — I10 ESSENTIAL HYPERTENSION: ICD-10-CM

## 2023-10-18 DIAGNOSIS — I47.19 ATRIAL TACHYCARDIA (H): ICD-10-CM

## 2023-10-18 DIAGNOSIS — G47.33 OSA ON CPAP: ICD-10-CM

## 2023-10-18 DIAGNOSIS — Z98.890 STATUS POST CATHETER ABLATION OF ATRIAL FIBRILLATION: ICD-10-CM

## 2023-10-18 DIAGNOSIS — I48.4 ATYPICAL ATRIAL FLUTTER (H): ICD-10-CM

## 2023-10-18 DIAGNOSIS — I48.19 PERSISTENT ATRIAL FIBRILLATION (H): Primary | ICD-10-CM

## 2023-10-18 PROCEDURE — 99214 OFFICE O/P EST MOD 30 MIN: CPT | Performed by: NURSE PRACTITIONER

## 2023-10-18 PROCEDURE — 93000 ELECTROCARDIOGRAM COMPLETE: CPT | Performed by: INTERNAL MEDICINE

## 2023-10-18 NOTE — PATIENT INSTRUCTIONS
Yvonne Fatima,    It was a pleasure to see you today at the [facility].     My recommendations after this visit include:  -Continue current medications  -Follow up with Dr. Corona in about 6 weeks    Please do not hesitate to call with additional questions or concerns.     Rajani Mcgrath, CNP  Clinical Cardiac Electrophysiology  [facility]  Clinic and [phone]  Fax: [phone]  Electrophysiology Nurses: [phone]

## 2023-10-18 NOTE — LETTER
10/18/2023    JUAN YBARRA  [address] [facility] [address]    RE: Yvonne Fatima       Dear Colleague,     I had the pleasure of seeing Yvonne Fatiam in the [facility].       [facility]  Cardiac Electrophysiology  [address]  [place] [address]   Office: [phone]  Fax: [phone]     HEART CARE ELECTROPHYSIOLOGY FOLLOW UP    Primary Care: Juan Ybarra MD    Assessment/Recommendations     Persistent atrial fibrillation, atrial flutter, focal atrial tachycardia: Status post catheter ablation of focal atrial tachycardia 9/6/2023; baseline isolation of PVI, roofline, GREGORIO line and CFE, with noted severely reduced LA voltage. She has had no complications subsequent to catheter ablation procedure.  She is aware of decreased likelihood of sustained sinus rhythm.  Her ventricular response is currently controlled off AV ricardo blocking agents    [id number] score of 5 for age >75, female gender, hypertension and type 2 diabetes. No bleeding complications. INRs therapeutic  -Continue warfarin as ordered for stroke prophylaxis    Essential hypertension: Well managed on current regimen    SHAVON: Consistent use of CPAP    Follow up: with Dr. Corona 6 weeks       History of Present Illness/Subjective    Yvonne Fatima is a 83 year old female who is seen today for follow up status post atrial fibrillation ablation. She has a past medical history significant for essential hypertension, SHAVON, type 2 diabetes.  She underwent RF ablation of induced lateral left atrial atrial tachycardia 9/6/2023, with baseline isolation of PVI, roofline, GREGORIO line and CFE, with noted severely reduced LA voltage.  Her sotalol was discontinued following procedure. 1 week after ablation she had an episode of elevated heart rates 110s lasting several hours, converting to sinus rhythm spontaneously.  She is on no AV ricardo blocking agents.  She has had no recurrent  symptoms of racing heart, lightheadedness/dizziness or weakness.  She denies changes in activity tolerance, chest discomfort, palpitations or dyspnea.  She has resumed all activity as prior to ablation. She denies groin site issues, heartburn, difficulty swallowing, or neurologic changes.      Data Review     Arrhythmia hx:   Dx/date: Unknown date of diagnosis.  Catheter ablation March 2011 as below, repeat ablation July 2011.  Development of atypical atrial flutter 2013, requiring DCCV with prior sotalol initiation.  Multiple recurrences since that time requiring cardioversion in 2019 and 2021.  Symptomatic recurrence June 2023, DCCV 6/21/2023.  Status post catheter ablation 9/6/2023 as below.  Sx: Lightheadedness, weakness, general malaise  Prior AAD, AV ricardo blocking agents: Sotalol, Multaq  Ablation hx  March 2011-PVI (cryo), CTI line  July 2011-PVI (RF), CFE, roofline and GREGORIO line  9/6/2023, Dr. Corona-focal AT (lateral left atrium); baseline isolation of PVI, roofline, GREGORIO line and CFV.  Severely reduced LA voltage, viable myocardium <5%.    EKG:   10/18/2023: Atypical atrial flutter vs AT  9/6/2023: SR 83 bpm, QRS 94 ms, QT/QTc 426/500 ms  8/31/2023: Sinus rhythm 66 bpm, AV delay  ms, QRS 94 ms, QT/QTc 432/452 ms  6/21/2023: Sinus rhythm 50 bpm, brief second-degree AVB type II,  ms, QT/QTc 488/444 ms  6/16/2023: Atypical atrial flutter 105 bpm, QRS 84 ms, QT/QTc 370/489 ms  Personally reviewed.      TTE: 8/7/2023  The left ventricle is normal in size.  Left ventricular function is normal.The ejection fraction is 60-65%.  Left ventricular diastolic function is abnormal.  The right ventricle is mildly dilated.  The left atrium is moderately dilated. The right atrium is moderately dilated.  There is severe mitral annular calcification.  There is mild mitral stenosis.  There is moderate (2+) tricuspid regurgitation.  Right ventricular systolic pressure is elevated, consistent with mild  pulmonary  "hypertension.    I have reviewed and updated the patient's past medical history, allergy list and medication list.                Physical Examination Review of Systems   Vitals: /68 (BP Location: Left arm, Patient Position: Sitting, Cuff Size: Adult Large)   Pulse 93   Resp 16   Ht 1.549 m (5' 1\")   Wt 66.2 kg (146 lb)   BMI 27.59 kg/m      BMI= Body mass index is 27.59 kg/m .    Wt Readings from Last 3 Encounters:   10/18/23 66.2 kg (146 lb)   09/06/23 65.3 kg (144 lb)   08/31/23 65.3 kg (144 lb)       General   Appearance:   Alert and oriented, in no acute distress.    HEENT:  Normocephalic and atraumatic. Conjunctiva and sclera are clear. Moist oral mucosa.    Neck: No JVP, carotid bruit or obvious thyromegaly.   Lungs:   Respirations unlabored. Clear bilaterally with no rales, rhonchi, or wheezes.     Cardiovascular:   Rhythm is regular. S1 and S2 are normal. No significant murmur is present. Lower extremities demonstrate no significant edema. Posterior tibial pulses are intact bilaterally.   Extremities: No cyanosis or clubbing   Skin: Skin is warm, dry, and otherwise intact.   Neurologic: Gait not assessed. Mood and affect appropriate.    A 12 point comprehensive review of systems was  negative except as noted.      Medical History  Surgical History Family History Social History   Past Medical History:   Diagnosis Date    Atrial fibrillation (H)     [id number] score of 7-on chronic warfarin Previously failed Sotalol and Multaq therapy PVI 3/11 Repeat PVI 7/11  Recurrent A fib, most recent CV Feb 2013 Rx sotalol     Atrial flutter (H)     Atypical flutter with RVR after second PVI in July 2011      Esophageal reflux     Created by Conversion     Essential hypertension          Status post catheter ablation of atrial fibrillation 6/3/2019    PVI 3/11(PVI/Cryo + CTI line) Repeat PVI 7/11 (PVI/RF + CFE + Roof line + GREGORIO line)    Past Surgical History:   Procedure Laterality Date    EP ABLATION " PULMONARY VEIN ISOLATION N/A 2023    Procedure: Ablation Atrial Fibrillation;  Surgeon: Timur Corona MD;  Location: [facility]    HC REMOVE TONSILS/ADENOIDS,<13 Y/O      Description: Tonsillectomy With Adenoidectomy;  Recorded: 10/17/2012;    HC REVISE MEDIAN N/CARPAL TUNNEL SURG      Description: Neuroplasty Decompression Median Nerve At Carpal Tunnel;  Recorded: 10/17/2012;    OR ABLATE HEART DYSRHYTHM FOCUS      Description: Catheter Ablation Atrial Fibrillation;  Recorded: 2013;  Comments: PVI 3-7-2011 (PVI/cryo + CTI line); ; Repeat PVI 2011 (PVI/RF + CFE + Roof line + GREGORIO line)    OR ABLATE HEART DYSRHYTHM FOCUS      Description: Catheter Ablation Atrial Fibrillation;  Recorded: 2014;  Comments: PVI 3-7-2011 (PVI/cryo + CTI line); ; Repeat PVI 2011 (PVI/RF + CFE + Roof line + GREGORIO line)    OR CARDIOVERSION ELECTIVE ARRHYTHMIA EXTERNAL       Fib 10/17/12; ; Flutter 2/15/13; AFl 2019    Family History   Problem Relation Age of Onset    Aneurysm Mother         brain    Emphysema Father     Lung Cancer Sister     Hypertension Brother     Lung Cancer Sister     Lung Cancer Sister     Breast Cancer Sister     Social History     Socioeconomic History    Marital status:      Spouse name: Not on file    Number of children: 3    Years of education: Not on file    Highest education level: Not on file   Occupational History    Not on file   Tobacco Use    Smoking status: Former     Types: Cigarettes     Quit date: 1962     Years since quittin.5    Smokeless tobacco: Never   Substance and Sexual Activity    Alcohol use: Yes     Comment: Alcoholic Drinks/day: 6 per month    Drug use: No    Sexual activity: Yes   Other Topics Concern    Not on file   Social History Narrative    Not on file     Social Determinants of Health     Financial Resource Strain: Not on file   Food Insecurity: Not on file   Transportation Needs: Not on file   Physical Activity: Not on file    Stress: Not on file   Social Connections: Not on file   Interpersonal Safety: Not on file   Housing Stability: Not on file          Medications  Allergies   Scheduled Meds:  Current Outpatient Medications   Medication Sig Dispense Refill    allopurinol (ZYLOPRIM) 100 MG tablet [ALLOPURINOL (ZYLOPRIM) 100 MG TABLET] Take 100 mg by mouth 2 (two) times a day.             aspirin 81 MG EC tablet Take 1 tablet (81 mg) by mouth daily 28 tablet 0    atorvastatin (LIPITOR) 40 MG tablet Take 1 tablet by mouth daily      BD SUELLEN U/F 32G X 4 MM insulin pen needle       gabapentin (NEURONTIN) 100 MG capsule Take 100 mg by mouth 2 times daily      glipiZIDE (GLUCOTROL) 10 MG tablet Take 20 mg by mouth daily       Lancets (ONETOUCH DELICA PLUS [id number]) MISC Test once daily varying time,      ONETOUCH VERIO IQ test strip Test 3 times a day.      potassium chloride (KLOR-CON) 10 MEQ CR tablet [POTASSIUM CHLORIDE (KLOR-CON) 10 MEQ CR TABLET] Take 20 mEq by mouth 2 (two) times a day.      SOLIQUA pen Inject 50 Units Subcutaneous every morning (before breakfast)      spironolactone-hydrochlorothiazide (ALDACTAZIDE) 25-25 mg tablet Take by mouth daily Patient takes one-half tablet daily.      warfarin (COUMADIN) 2.5 MG tablet [WARFARIN (COUMADIN) 2.5 MG TABLET] Adjust dose based on INR results as directed.      pregabalin (LYRICA) 50 MG capsule Take 50 mg by mouth every evening      Allergies   Allergen Reactions    Ace Inhibitors Cough    Empagliflozin Other (See Comments)     weakness    Irbesartan Other (See Comments)     exhaustion    Metformin GI Disturbance     2013.  Retry 2016- gastrointestinal intolerance again    Pioglitazone Other (See Comments)     Hand leg and facial swelling         Lab Results    Chemistry/lipid CBC Cardiac Enzymes/BNP/TSH/INR   Lab Results   Component Value Date    BUN 20.2 09/06/2023     (L) 09/06/2023    CO2 21 (L) 09/06/2023    Lab Results   Component Value Date    WBC 9.2 09/06/2023    HGB  13.1 2023    HCT 38.9 2023    MCV 89 2023     2023    @RESUFAST(BMP,CBC,BNP,TSH,  INR)@      31 minutes spent reviewing prior records (including documentation, laboratory studies, cardiac testing/imaging), history and physical exam, planning, and subsequent documentation.     This note has been dictated using voice recognition software. Any grammatical, typographical, or context distortions are unintentional and inherent to the software.    Rajani Mcrgath CNP  Clinical Cardiac Electrophysiology  [facility]  Clinic and [phone]  Fax: [phone]  Electrophysiology Nurses: [phone]        Thank you for allowing me to participate in the care of your patient.      Sincerely,     TEODORO MEYER CNP     [facility]  cc:   No referring provider defined for this encounter.

## 2023-10-19 LAB
ATRIAL RATE - MUSE: 85 BPM
DIASTOLIC BLOOD PRESSURE - MUSE: NORMAL MMHG
INTERPRETATION ECG - MUSE: NORMAL
P AXIS - MUSE: 52 DEGREES
PR INTERVAL - MUSE: 192 MS
QRS DURATION - MUSE: 84 MS
QT - MUSE: 372 MS
QTC - MUSE: 442 MS
R AXIS - MUSE: 13 DEGREES
SYSTOLIC BLOOD PRESSURE - MUSE: NORMAL MMHG
T AXIS - MUSE: 13 DEGREES
VENTRICULAR RATE- MUSE: 85 BPM

## 2023-11-24 ENCOUNTER — OFFICE VISIT (OUTPATIENT)
Dept: CARDIOLOGY | Facility: CLINIC | Age: 83
End: 2023-11-24
Attending: NURSE PRACTITIONER
Payer: COMMERCIAL

## 2023-11-24 VITALS
WEIGHT: 142 LBS | SYSTOLIC BLOOD PRESSURE: 134 MMHG | BODY MASS INDEX: 26.83 KG/M2 | OXYGEN SATURATION: 98 % | HEART RATE: 87 BPM | DIASTOLIC BLOOD PRESSURE: 72 MMHG | RESPIRATION RATE: 16 BRPM

## 2023-11-24 DIAGNOSIS — I48.4 ATYPICAL ATRIAL FLUTTER (H): ICD-10-CM

## 2023-11-24 DIAGNOSIS — I48.19 PERSISTENT ATRIAL FIBRILLATION (H): ICD-10-CM

## 2023-11-24 DIAGNOSIS — I47.19 ATRIAL TACHYCARDIA (H): Primary | ICD-10-CM

## 2023-11-24 DIAGNOSIS — Z98.890 STATUS POST CATHETER ABLATION OF ATRIAL FIBRILLATION: ICD-10-CM

## 2023-11-24 PROCEDURE — 99215 OFFICE O/P EST HI 40 MIN: CPT | Performed by: INTERNAL MEDICINE

## 2023-11-24 NOTE — Clinical Note
Hi team, Please see my note from today. Can 1 of you please reach out to the patient and see if she wishes to schedule appointment to discuss LAAC further. Thanks Timur

## 2023-11-24 NOTE — LETTER
11/24/2023    JUAN YBARRA  [address] [facility] [address]    RE: Yvonneconcha Fatima       Dear Colleague,     I had the pleasure of seeing Yvonne Fatima in the [facility].    Formerly [facility] [place]  Cardiac Electrophysiology     Progress Note: Timur Corona MD    Primary Care: Juan Ybarra MD    Primary Cardiologist:     Primary Electrophysiologist: Timur Corona MD    Assessment:       Persistent atrial fibrillation: Chronic problem for the patient who was diagnosed prior to 2010.  She has undergone multiple ablations most recently 9/6/2023 (focal AT, confirmed prior PVI + GREGORIO line + roofline + CTI line).  At the time of her study she was demonstrated to have severe loss of left atrial voltage (estimated viability <5%).  The patient had some early episodes of elevated heart rate but subsequently has noted no heart racing, palpitations or other symptoms associated with her atrial tachycardia , atrial fibrillation, and atrial flutter in the past.  Follow-up ECG demonstrated sinus rhythm, but the patient has not worn a monitor following her most recent ablation. The patient has a [id number] score of 5 and remains on OAC (warfarin).  She reports that she recently had a fall 2 weeks ago and that she does have fairly prominent issues with her balance which has been negatively impacted by her peripheral neuropathy.  We discussed the potential role for left atrial appendage closure and the patient is interested in learning more and possibly pursuing that therapy.  Essential hypertension: Chronic problem for the patient and her blood pressure today is at target on her current medical therapy.  Obstructive sleep apnea: Chronic problem for the patient and she reports consistent use of CPAP.      Recommendations:  No change in current medications.  The patient be scheduled to wear a 24-hour Holter monitor in about 2 months.  The patient will be contacted by the structural heart  LAAC (left atrial appendage closure) nurse clinician to determine if she would like to move forward with scheduling an appointment to formally discuss the procedure and whether she wants to move forward.  Follow-up in the arrhythmia clinic in 6 months with the EP HEAVEN.    Time spent: 45 minutes spent on the date of the encounter doing chart review, history and exam, documentation and further activities as noted above.    Subjective:  Yvonne Fatima (83 year old female) returns to the arrhythmia clinic for interval reevaluation of her rhythm status following ablation.  Patient is 2.5 months out from her ablation procedure.  She reports that overall her rhythm is felt good.  She reports no sensation of tachypalpitations or racing.  She has not had any lightheadedness, presyncope or syncope.  Overall she feels well, but she does mention that she had a fall 2 weeks ago when she got up out of bed.  She felt unsteady and dizzy and ultimately fell striking her back.  She did not have a significant injury.  She reports that her balance in general has been progressively worsening and that in part is explained by her peripheral neuropathy.  She notes no other change in her general health care status.    Current Outpatient Medications   Medication Sig Dispense Refill    allopurinol (ZYLOPRIM) 100 MG tablet [ALLOPURINOL (ZYLOPRIM) 100 MG TABLET] Take 100 mg by mouth 2 (two) times a day.             atorvastatin (LIPITOR) 40 MG tablet Take 1 tablet by mouth daily      BD SUELLEN U/F 32G X 4 MM insulin pen needle       gabapentin (NEURONTIN) 100 MG capsule Take 100 mg by mouth 2 times daily      glipiZIDE (GLUCOTROL) 10 MG tablet Take 20 mg by mouth daily       Lancets (ONETOUCH DELICA PLUS [id number]) MISC Test once daily varying time,      ONETOUCH VERIO IQ test strip Test 3 times a day.      potassium chloride (KLOR-CON) 10 MEQ CR tablet [POTASSIUM CHLORIDE (KLOR-CON) 10 MEQ CR TABLET] Take 20 mEq by mouth 2 (two) times a day.       pregabalin (LYRICA) 50 MG capsule Take 50 mg by mouth every evening      SOLIQUA pen Inject 50 Units Subcutaneous every morning (before breakfast)      spironolactone-hydrochlorothiazide (ALDACTAZIDE) 25-25 mg tablet Take by mouth daily Patient takes one-half tablet daily.      warfarin (COUMADIN) 2.5 MG tablet [WARFARIN (COUMADIN) 2.5 MG TABLET] Adjust dose based on INR results as directed.         Review of Systems:     Family History  Family History   Problem Relation Age of Onset    Aneurysm Mother         brain    Emphysema Father     Lung Cancer Sister     Hypertension Brother     Lung Cancer Sister     Lung Cancer Sister     Breast Cancer Sister        Social History   reports that she quit smoking about 61 years ago. Her smoking use included cigarettes. She has never used smokeless tobacco. She reports current alcohol use. She reports that she does not use drugs.    Objective:   Vital signs in last 24 hours:  /72 (BP Location: Left arm, Patient Position: Sitting, Cuff Size: Adult Regular)   Pulse 87   Resp 16   Wt 64.4 kg (142 lb)   SpO2 98%   BMI 26.83 kg/m      Physical Exam:  General: The patient is alert oriented to person place and situation.  The patient is in no acute distress at the time of my evaluation.  Eyes: Pupils are equal, round, and reactive to light.  Conjunctiva and sclera are clear.  ENT: Oral mucosa is moist and without redness. No evident nasal discharge.  Pulmonary: Lungs are clear bilaterally with no rales, rhonchi, or wheezes.    Cardiovascular exam: Rhythm is regular with occasional ectopic beats. S1 and S2 are normal. No significant murmur is present. JVP is normal. Lower extremities demonstrate no significant edema. Distal pulses are intact bilaterally.  Abdomen is soft, nontender, no organomegaly, and bowel sounds present.  Musculoskeletal: Spine is straight. Extremities without deformity.  Neuro: Gait is symmetric.     Skin is warm, dry, and otherwise  intact.      Cardiographics:   Echocardiogram 8/7/2023  The left ventricle is normal in size.  Left ventricular function is normal.The ejection fraction is 60-65%.  Left ventricular diastolic function is abnormal.  The right ventricle is mildly dilated.  The left atrium is moderately dilated. The right atrium is moderately dilated.  There is severe mitral annular calcification.  There is mild mitral stenosis.  There is moderate (2+) tricuspid regurgitation.  Right ventricular systolic pressure is elevated, consistent with mild  pulmonary hypertension.      Lab Results:         Outside record review:          Thank you for allowing me to participate in the care of your patient.      Sincerely,     Timur Corona MD     [facility]  cc:   [facility]  HEART & VASCULAR [address]  [address] [place] [address]

## 2023-11-24 NOTE — PROGRESS NOTES
[facility]  Cardiac Electrophysiology     Progress Note: Timur Corona MD    Primary Care: uJan Mcmanus MD    Primary Cardiologist:     Primary Electrophysiologist: Timur Corona MD    Assessment:       Persistent atrial fibrillation: Chronic problem for the patient who was diagnosed prior to 2010.  She has undergone multiple ablations most recently 9/6/2023 (focal AT, confirmed prior PVI + GREGORIO line + roofline + CTI line).  At the time of her study she was demonstrated to have severe loss of left atrial voltage (estimated viability <5%).  The patient had some early episodes of elevated heart rate but subsequently has noted no heart racing, palpitations or other symptoms associated with her atrial tachycardia , atrial fibrillation, and atrial flutter in the past.  Follow-up ECG demonstrated sinus rhythm, but the patient has not worn a monitor following her most recent ablation. The patient has a [id number] score of 5 and remains on OAC (warfarin).  She reports that she recently had a fall 2 weeks ago and that she does have fairly prominent issues with her balance which has been negatively impacted by her peripheral neuropathy.  We discussed the potential role for left atrial appendage closure and the patient is interested in learning more and possibly pursuing that therapy.  Essential hypertension: Chronic problem for the patient and her blood pressure today is at target on her current medical therapy.  Obstructive sleep apnea: Chronic problem for the patient and she reports consistent use of CPAP.      Recommendations:  No change in current medications.  The patient be scheduled to wear a 24-hour Holter monitor in about 2 months.  The patient will be contacted by the structural heart LAAC (left atrial appendage closure) nurse clinician to determine if she would like to move forward with scheduling an appointment to formally discuss the procedure and whether she wants to move forward.  Follow-up  in the arrhythmia clinic in 6 months with the EP HEAVEN.    Time spent: 45 minutes spent on the date of the encounter doing chart review, history and exam, documentation and further activities as noted above.    Subjective:  Yvonne Fatima (83 year old female) returns to the arrhythmia clinic for interval reevaluation of her rhythm status following ablation.  Patient is 2.5 months out from her ablation procedure.  She reports that overall her rhythm is felt good.  She reports no sensation of tachypalpitations or racing.  She has not had any lightheadedness, presyncope or syncope.  Overall she feels well, but she does mention that she had a fall 2 weeks ago when she got up out of bed.  She felt unsteady and dizzy and ultimately fell striking her back.  She did not have a significant injury.  She reports that her balance in general has been progressively worsening and that in part is explained by her peripheral neuropathy.  She notes no other change in her general health care status.    Current Outpatient Medications   Medication Sig Dispense Refill    allopurinol (ZYLOPRIM) 100 MG tablet [ALLOPURINOL (ZYLOPRIM) 100 MG TABLET] Take 100 mg by mouth 2 (two) times a day.             atorvastatin (LIPITOR) 40 MG tablet Take 1 tablet by mouth daily      BD SUELLEN U/F 32G X 4 MM insulin pen needle       gabapentin (NEURONTIN) 100 MG capsule Take 100 mg by mouth 2 times daily      glipiZIDE (GLUCOTROL) 10 MG tablet Take 20 mg by mouth daily       Lancets (ONETOUCH DELICA PLUS [id number]) MISC Test once daily varying time,      ONETOUCH VERIO IQ test strip Test 3 times a day.      potassium chloride (KLOR-CON) 10 MEQ CR tablet [POTASSIUM CHLORIDE (KLOR-CON) 10 MEQ CR TABLET] Take 20 mEq by mouth 2 (two) times a day.      pregabalin (LYRICA) 50 MG capsule Take 50 mg by mouth every evening      SOLIQUA pen Inject 50 Units Subcutaneous every morning (before breakfast)      spironolactone-hydrochlorothiazide (ALDACTAZIDE) 25-25 mg  tablet Take by mouth daily Patient takes one-half tablet daily.      warfarin (COUMADIN) 2.5 MG tablet [WARFARIN (COUMADIN) 2.5 MG TABLET] Adjust dose based on INR results as directed.         Review of Systems:     Family History  Family History   Problem Relation Age of Onset    Aneurysm Mother         brain    Emphysema Father     Lung Cancer Sister     Hypertension Brother     Lung Cancer Sister     Lung Cancer Sister     Breast Cancer Sister        Social History   reports that she quit smoking about 61 years ago. Her smoking use included cigarettes. She has never used smokeless tobacco. She reports current alcohol use. She reports that she does not use drugs.    Objective:   Vital signs in last 24 hours:  /72 (BP Location: Left arm, Patient Position: Sitting, Cuff Size: Adult Regular)   Pulse 87   Resp 16   Wt 64.4 kg (142 lb)   SpO2 98%   BMI 26.83 kg/m      Physical Exam:  General: The patient is alert oriented to person place and situation.  The patient is in no acute distress at the time of my evaluation.  Eyes: Pupils are equal, round, and reactive to light.  Conjunctiva and sclera are clear.  ENT: Oral mucosa is moist and without redness. No evident nasal discharge.  Pulmonary: Lungs are clear bilaterally with no rales, rhonchi, or wheezes.    Cardiovascular exam: Rhythm is regular with occasional ectopic beats. S1 and S2 are normal. No significant murmur is present. JVP is normal. Lower extremities demonstrate no significant edema. Distal pulses are intact bilaterally.  Abdomen is soft, nontender, no organomegaly, and bowel sounds present.  Musculoskeletal: Spine is straight. Extremities without deformity.  Neuro: Gait is symmetric.     Skin is warm, dry, and otherwise intact.      Cardiographics:   Echocardiogram 8/7/2023  The left ventricle is normal in size.  Left ventricular function is normal.The ejection fraction is 60-65%.  Left ventricular diastolic function is abnormal.  The right  ventricle is mildly dilated.  The left atrium is moderately dilated. The right atrium is moderately dilated.  There is severe mitral annular calcification.  There is mild mitral stenosis.  There is moderate (2+) tricuspid regurgitation.  Right ventricular systolic pressure is elevated, consistent with mild  pulmonary hypertension.      Lab Results:         Outside record review:

## 2023-11-28 ENCOUNTER — TELEPHONE (OUTPATIENT)
Dept: CARDIOLOGY | Facility: CLINIC | Age: 83
End: 2023-11-28
Payer: COMMERCIAL

## 2023-11-28 NOTE — TELEPHONE ENCOUNTER
Phone call to patient, she is interested in LAAC consult. Call transferred to scheduling to arrange. No further questions at this time. [facility]    ----- Message -----  From: Timur Corona MD  Sent: 11/24/2023   4:58 PM CST  To: [facility] Left Atrial Appendage Closure [place] - [facility] team,  Please see my note from today.  Can 1 of you please reach out to the patient and see if she wishes to schedule appointment to discuss LAAC further.  Thanks  Timur

## 2023-12-08 ENCOUNTER — TRANSFERRED RECORDS (OUTPATIENT)
Dept: HEALTH INFORMATION MANAGEMENT | Facility: CLINIC | Age: 83
End: 2023-12-08
Payer: COMMERCIAL

## 2023-12-28 ENCOUNTER — OFFICE VISIT (OUTPATIENT)
Dept: CARDIOLOGY | Facility: CLINIC | Age: 83
End: 2023-12-28
Payer: COMMERCIAL

## 2023-12-28 VITALS
BODY MASS INDEX: 27.19 KG/M2 | WEIGHT: 144 LBS | RESPIRATION RATE: 16 BRPM | HEART RATE: 80 BPM | DIASTOLIC BLOOD PRESSURE: 64 MMHG | SYSTOLIC BLOOD PRESSURE: 132 MMHG | HEIGHT: 61 IN

## 2023-12-28 DIAGNOSIS — I48.92 ATRIAL FLUTTER, UNSPECIFIED TYPE (H): ICD-10-CM

## 2023-12-28 DIAGNOSIS — I48.19 PERSISTENT ATRIAL FIBRILLATION (H): Primary | ICD-10-CM

## 2023-12-28 DIAGNOSIS — E78.5 DYSLIPIDEMIA: ICD-10-CM

## 2023-12-28 DIAGNOSIS — N18.31 CHRONIC KIDNEY DISEASE, STAGE 3A (H): ICD-10-CM

## 2023-12-28 DIAGNOSIS — I10 ESSENTIAL HYPERTENSION: ICD-10-CM

## 2023-12-28 PROCEDURE — 99215 OFFICE O/P EST HI 40 MIN: CPT | Performed by: PHYSICIAN ASSISTANT

## 2023-12-28 RX ORDER — GLIPIZIDE 10 MG/1
2 TABLET, FILM COATED, EXTENDED RELEASE ORAL DAILY
COMMUNITY
Start: 2023-11-06 | End: 2024-05-22

## 2023-12-28 NOTE — PATIENT INSTRUCTIONS
Yvonne Fatima,    It was a pleasure to see you today in the clinic regarding your Watchman Consult.     My recommendations after this visit include:     - No medication changes    - Proceed with scheduling the Watchman procedure and SDM visit if you are interested. Please call if you have any questions.      If you have questions or concerns, please call using the numbers below:    After Hours/Scheduling  [phone]    Otherwise you can dial the nurse directly at:             AGUS Ascencio RN  [phone]    Millie Randall PA-C  Structural Heart Program  [facility]

## 2023-12-28 NOTE — PROGRESS NOTES
HEART CARE ENCOUNTER NOTE       [facility]  [phone]      Assessment/Recommendations   1.  Persistent atrial fibrillation, atrial flutter, focal atrial tachycardia: She underwent catheter ablation of focal atrial tachycardia on 9/6/2023    I have personally reviewed this patient's chart and have spoken with the patient about the treatment options, including JENNIFER device.  She has a [id number] score of 7 for age greater than 75, TIA, female, hypertension, type 2 diabetes.  She has a HAS-BLED score of 3 for age, bleeding predisposition, TIA.   She is not a good candidate for long-term anticoagulation due to fall risk.      Once scheduled, the patient will stay on warfarin up until implant.  After implant, the patient will be changed to aspirin 81 mg daily and Plavix 75 mg daily.  She will take DAPT for 6 months, then stop Plavix and remain on aspirin 81 mg daily, indefinitely.  She will have a CT or HALI approximately 3 months post-implant.  She understands that the risks of the procedure are <2% and include, but are not limited to device embolization, air embolism, myocardial perforation, device thrombosis, ASD, stroke, or death.  We discussed expected recovery and follow-up.       The patient is a good candidate for proceeding with left atrial appendage screening and implant.  Her questions were answered to her satisfaction.      2.  Hypertension -blood pressure is controlled. Continue spironolactone-hydrochlorothiazide    3.  Dyslipidemia -last LDL 51, total cholesterol 118.  Continue Lipitor 40 mg daily    4.  Chronic kidney disease stage III - creatinine and GFR have been stable     5.  Type 2 diabetes mellitus -most recent hemoglobin A1c 9. currently on glipizide, managed.  PCP/diabetes clinic through [facility].  She has follow-up with them next week    6. History of TIA - approximately 10 years ago while visiting out of state.  Denies residual deficits.       History of Present  Illness/Subjective    Yvonne Fatima is a 83 year old female who comes in today for LAAO consult.    Yvonne Fatima has a past history of persistent atrial fibrillation, atrial flutter, focal atrial tachycardia (status post ablations), hypertension, dyslipidemia, chronic kidney disease stage III, obstructive sleep apnea (uses CPAP) GERD, type 2 diabetes mellitus, previous history of TIA    She had has had previous catheter ablations in 2011 and recently in September 2023.  She has undergone multiple cardioversions in the past. Her INRs have been stable.  She reports a TIA 10 years ago while visiting out of state when she stopped taking her warfarin.  She denies any residual deficits.  She denies previous DVT or pulmonary embolism.  She denies aspirin or NSAID use.  She denies bleeding issues, nosebleeds, black tarry stool.  She does state that she has hemorrhoids and wants to lower her blood in her stool.  She denies swallowing issues.  She does report some balance issues but denies any recent falls.  She reports occasional dizziness and notes her blood sugar will be on the lower side and she feels better after it is corrected.    Yvonne Fatima denies chest discomfort, palpitations, shortness of breath, paroxysmal nocturnal dyspnea, orthopnea, pre-syncope, or syncope.  Yvonne Fatima also denies any weight loss, changes in appetite, nausea or vomiting.     Medical, surgical, family, social history, and medications were reviewed and updated as necessary.    ECHO results (from 8/7/2023):  Interpretation Summary     The left ventricle is normal in size.  Left ventricular function is normal.The ejection fraction is 60-65%.  Left ventricular diastolic function is abnormal.  The right ventricle is mildly dilated.  The left atrium is moderately dilated. The right atrium is moderately dilated.  There is severe mitral annular calcification.  There is mild mitral stenosis.  There is moderate (2+) tricuspid  "regurgitation.  Right ventricular systolic pressure is elevated, consistent with mild  pulmonary hypertension.    EKG (personally reviewed and interpreted): 12/8/2023  Accelerated junctional rhythm  Ventricular rate 85, QRS 82, QT/QTc 374/445     Physical Examination Review of Systems   Vitals: /64 (BP Location: Left arm, Patient Position: Sitting, Cuff Size: Adult Regular)   Pulse 80   Resp 16   Ht 1.549 m (5' 1\")   Wt 65.3 kg (144 lb)   BMI 27.21 kg/m    BMI= Body mass index is 27.21 kg/m .  Wt Readings from Last 3 Encounters:   12/28/23 65.3 kg (144 lb)   11/24/23 64.4 kg (142 lb)   10/18/23 66.2 kg (146 lb)       General Appearance:   Alert, cooperative and in no acute distress   ENT/Mouth: membranes moist, no oral lesions or bleeding gums.      EYES:  no scleral icterus, normal conjunctivae   Neck: Thyroid not visualized   Chest/Lungs:   lungs are clear to auscultation, no rales or wheezing   Cardiovascular:   Regular. Normal first and second heart sounds with no murmurs, rubs or gallops; the carotid, radial and posterior tibial pulses are intact, no edema bilaterally    Abdomen:  Soft and nontender. Bowel sounds are present in all quadrants   Extremities: no cyanosis or clubbing   Skin: no xanthelasma, warm.    Neurologic: normal gait, normal  bilateral, no tremors   Psychiatric: Normal mood and affect       Please refer above for cardiac ROS details.      Medical History  Surgical History Family History Social History   Past Medical History:   Diagnosis Date    Atrial fibrillation (H)     [id number] score of 7-on chronic warfarin Previously failed Sotalol and Multaq therapy PVI 3/11 Repeat PVI 7/11  Recurrent A fib, most recent CV Feb 2013 Rx sotalol     Atrial flutter (H)     Atypical flutter with RVR after second PVI in July 2011      Esophageal reflux     Created by Conversion     Essential hypertension          Status post catheter ablation of atrial fibrillation 6/3/2019    PVI " 3/11(PVI/Cryo + CTI line) Repeat PVI  (PVI/RF + CFE + Roof line + GREGORIO line)     Past Surgical History:   Procedure Laterality Date    EP ABLATION PULMONARY VEIN ISOLATION N/A 2023    Procedure: Ablation Atrial Fibrillation;  Surgeon: Timur Corona MD;  Location: [facility] CV    HC REMOVE TONSILS/ADENOIDS,<11 Y/O      Description: Tonsillectomy With Adenoidectomy;  Recorded: 10/17/2012;    HC REVISE MEDIAN N/CARPAL TUNNEL SURG      Description: Neuroplasty Decompression Median Nerve At Carpal Tunnel;  Recorded: 10/17/2012;    CO ABLATE HEART DYSRHYTHM FOCUS      Description: Catheter Ablation Atrial Fibrillation;  Recorded: 2013;  Comments: PVI 3-7-2011 (PVI/cryo + CTI line); ; Repeat PVI 2011 (PVI/RF + CFE + Roof line + GREGORIO line)    CO ABLATE HEART DYSRHYTHM FOCUS      Description: Catheter Ablation Atrial Fibrillation;  Recorded: 2014;  Comments: PVI 3-7-2011 (PVI/cryo + CTI line); ; Repeat PVI 2011 (PVI/RF + CFE + Roof line + GREGORIO line)    CO CARDIOVERSION ELECTIVE ARRHYTHMIA EXTERNAL       Fib 10/17/12; ; Flutter 2/15/13; AFl 2019     Family History   Problem Relation Age of Onset    Aneurysm Mother         brain    Emphysema Father     Lung Cancer Sister     Hypertension Brother     Lung Cancer Sister     Lung Cancer Sister     Breast Cancer Sister     Social History     Socioeconomic History    Marital status:      Spouse name: Not on file    Number of children: 3    Years of education: Not on file    Highest education level: Not on file   Occupational History    Not on file   Tobacco Use    Smoking status: Former     Types: Cigarettes     Quit date: 1962     Years since quittin.7    Smokeless tobacco: Never   Substance and Sexual Activity    Alcohol use: Yes     Comment: Alcoholic Drinks/day: 6 per month    Drug use: No    Sexual activity: Yes   Other Topics Concern    Not on file   Social History Narrative    Not on file     Social Determinants of  Health     Financial Resource Strain: Not on file   Food Insecurity: Not on file   Transportation Needs: Not on file   Physical Activity: Not on file   Stress: Not on file   Social Connections: Not on file   Interpersonal Safety: Not on file   Housing Stability: Not on file          Medications  Allergies   Current Outpatient Medications   Medication Sig Dispense Refill    allopurinol (ZYLOPRIM) 100 MG tablet [ALLOPURINOL (ZYLOPRIM) 100 MG TABLET] Take 100 mg by mouth 2 (two) times a day.             atorvastatin (LIPITOR) 40 MG tablet Take 1 tablet by mouth daily      BD SUELLEN U/F 32G X 4 MM insulin pen needle       glipiZIDE (GLUCOTROL XL) 10 MG 24 hr tablet Take 2 tablets by mouth daily      Lancets (ONETOUCH DELICA PLUS [id number]) MISC Test once daily varying time,      ONETOUCH VERIO IQ test strip Test 3 times a day.      potassium chloride (KLOR-CON) 10 MEQ CR tablet [POTASSIUM CHLORIDE (KLOR-CON) 10 MEQ CR TABLET] Take 20 mEq by mouth 2 (two) times a day.      SOLIQUA pen Inject 50 Units Subcutaneous every morning (before breakfast)      spironolactone-hydrochlorothiazide (ALDACTAZIDE) 25-25 mg tablet Take by mouth daily Patient takes one-half tablet daily.      warfarin (COUMADIN) 2.5 MG tablet [WARFARIN (COUMADIN) 2.5 MG TABLET] Adjust dose based on INR results as directed.      gabapentin (NEURONTIN) 100 MG capsule Take 100 mg by mouth 2 times daily (Patient not taking: Reported on 11/24/2023)      pregabalin (LYRICA) 50 MG capsule Take 50 mg by mouth every evening      Allergies   Allergen Reactions    Pregabalin Other (See Comments)     Foggy thinking    Ace Inhibitors Cough    Empagliflozin Other (See Comments)     weakness    Irbesartan Other (See Comments)     exhaustion    Metformin GI Disturbance     2013.  Retry 2016- gastrointestinal intolerance again    Pioglitazone Other (See Comments)     Hand leg and facial swelling         Lab Results    Chemistry/lipid CBC Cardiac Enzymes/BNP/TSH/INR   No  "results for input(s): \"CHOL\", \"HDL\", \"LDL\", \"TRIG\", \"CHOLHDLRATIO\" in the last 96330 hours.  No results for input(s): \"LDL\" in the last 26882 hours.  Recent Labs   Lab Test 09/06/23  1028   *   POTASSIUM 4.0   CHLORIDE 99   CO2 21*   *   BUN 20.2   CR 0.89   GFRESTIMATED 64   HOLLIS 9.4     Recent Labs   Lab Test 09/06/23  1028 08/31/23  0751 07/30/23  1000   CR 0.89 0.84 0.9     No results for input(s): \"A1C\" in the last 65291 hours. Recent Labs   Lab Test 09/06/23  1028   WBC 9.2   HGB 13.1   HCT 38.9   MCV 89        Recent Labs   Lab Test 09/06/23  1028 08/31/23  0751   HGB 13.1 13.4    No results for input(s): \"TROPONINI\" in the last 18207 hours.  No results for input(s): \"BNP\", \"NTBNPI\", \"NTBNP\" in the last 95097 hours.  No results for input(s): \"TSH\" in the last 38711 hours.  Recent Labs   Lab Test 09/15/23  0000 09/06/23  1028 08/23/23  1011   INR 2.6* 2.57* 3.2*        60 minutes spent on the date of encounter doing education, chart prep/review, review of outside records, review of test results, interpretation with above tests, patient visit, and documentation.      This note has been dictated using voice recognition software. Any grammatical or context distortions are unintentional and inherent to the software.    Millie Randall PA-C  Structural Heart Program  [facility]   "

## 2023-12-28 NOTE — LETTER
12/28/2023    DESHAUN YBARRA  [address] [facility] [place] [address]    RE: Yvonne Fatima       Dear Colleague,     I had the pleasure of seeing Yvonne Fatima in the [facility].  HEART CARE ENCOUNTER NOTE       [facility]  [phone]      Assessment/Recommendations   1.  Persistent atrial fibrillation, atrial flutter, focal atrial tachycardia: She underwent catheter ablation of focal atrial tachycardia on 9/6/2023    I have personally reviewed this patient's chart and have spoken with the patient about the treatment options, including JENNIFER device.  She has a [id number]-IEKj score of 7 for age greater than 75, TIA, female, hypertension, type 2 diabetes.  She has a HAS-BLED score of 3 for age, bleeding predisposition, TIA.   She is not a good candidate for long-term anticoagulation due to fall risk.      Once scheduled, the patient will stay on warfarin up until implant.  After implant, the patient will be changed to aspirin 81 mg daily and Plavix 75 mg daily.  She will take DAPT for 6 months, then stop Plavix and remain on aspirin 81 mg daily, indefinitely.  She will have a CT or HALI approximately 3 months post-implant.  She understands that the risks of the procedure are <2% and include, but are not limited to device embolization, air embolism, myocardial perforation, device thrombosis, ASD, stroke, or death.  We discussed expected recovery and follow-up.       The patient is a good candidate for proceeding with left atrial appendage screening and implant.  Her questions were answered to her satisfaction.      2.  Hypertension -blood pressure is controlled. Continue spironolactone-hydrochlorothiazide    3.  Dyslipidemia -last LDL 51, total cholesterol 118.  Continue Lipitor 40 mg daily    4.  Chronic kidney disease stage III - creatinine and GFR have been stable     5.  Type 2 diabetes mellitus -most recent hemoglobin A1c 9. currently on glipizide, managed.   PCP/diabetes clinic through [facility].  She has follow-up with them next week    6. History of TIA - approximately 10 years ago while visiting out of state.  Denies residual deficits.       History of Present Illness/Subjective    Yvonne Fatima is a 83 year old female who comes in today for LAAO consult.    Yvonne Fatima has a past history of persistent atrial fibrillation, atrial flutter, focal atrial tachycardia (status post ablations), hypertension, dyslipidemia, chronic kidney disease stage III, obstructive sleep apnea (uses CPAP) GERD, type 2 diabetes mellitus, previous history of TIA    She had has had previous catheter ablations in 2011 and recently in September 2023.  She has undergone multiple cardioversions in the past. Her INRs have been stable.  She reports a TIA 10 years ago while visiting [place] of state when she stopped taking her warfarin.  She denies any residual deficits.  She denies previous DVT or pulmonary embolism.  She denies aspirin or NSAID use.  She denies bleeding issues, nosebleeds, black tarry stool.  She does state that she has hemorrhoids and wants to lower her blood in her stool.  She denies swallowing issues.  She does report some balance issues but denies any recent falls.  She reports occasional dizziness and notes her blood sugar will be on the lower side and she feels better after it is corrected.    Yvonne Fatima denies chest discomfort, palpitations, shortness of breath, paroxysmal nocturnal dyspnea, orthopnea, pre-syncope, or syncope.  Yvonne Fatima also denies any weight loss, changes in appetite, nausea or vomiting.     Medical, surgical, family, social history, and medications were reviewed and updated as necessary.    ECHO results (from 8/7/2023):  Interpretation Summary     The left ventricle is normal in size.  Left ventricular function is normal.The ejection fraction is 60-65%.  Left ventricular diastolic function is abnormal.  The right ventricle is  "mildly dilated.  The left atrium is moderately dilated. The right atrium is moderately dilated.  There is severe mitral annular calcification.  There is mild mitral stenosis.  There is moderate (2+) tricuspid regurgitation.  Right ventricular systolic pressure is elevated, consistent with mild  pulmonary hypertension.    EKG (personally reviewed and interpreted): 12/8/2023  Accelerated junctional rhythm  Ventricular rate 85, QRS 82, QT/QTc 374/445     Physical Examination Review of Systems   Vitals: /64 (BP Location: Left arm, Patient Position: Sitting, Cuff Size: Adult Regular)   Pulse 80   Resp 16   Ht 1.549 m (5' 1\")   Wt 65.3 kg (144 lb)   BMI 27.21 kg/m    BMI= Body mass index is 27.21 kg/m .  Wt Readings from Last 3 Encounters:   12/28/23 65.3 kg (144 lb)   11/24/23 64.4 kg (142 lb)   10/18/23 66.2 kg (146 lb)       General Appearance:   Alert, cooperative and in no acute distress   ENT/Mouth: membranes moist, no oral lesions or bleeding gums.      EYES:  no scleral icterus, normal conjunctivae   Neck: Thyroid not visualized   Chest/Lungs:   lungs are clear to auscultation, no rales or wheezing   Cardiovascular:   Regular. Normal first and second heart sounds with no murmurs, rubs or gallops; the carotid, radial and posterior tibial pulses are intact, no edema bilaterally    Abdomen:  Soft and nontender. Bowel sounds are present in all quadrants   Extremities: no cyanosis or clubbing   Skin: no xanthelasma, warm.    Neurologic: normal gait, normal  bilateral, no tremors   Psychiatric: Normal mood and affect       Please refer above for cardiac ROS details.      Medical History  Surgical History Family History Social History   Past Medical History:   Diagnosis Date    Atrial fibrillation (H)     [id number] score of 7-on chronic warfarin Previously failed Sotalol and Multaq therapy PVI 3/11 Repeat PVI 7/11  Recurrent A fib, most recent CV Feb 2013 Rx sotalol     Atrial flutter (H)     Atypical " flutter with RVR after second PVI in 2011      Esophageal reflux     Created by Conversion     Essential hypertension          Status post catheter ablation of atrial fibrillation 6/3/2019    PVI 3/11(PVI/Cryo + CTI line) Repeat PVI  (PVI/RF + CFE + Roof line + GREGORIO line)     Past Surgical History:   Procedure Laterality Date    EP ABLATION PULMONARY VEIN ISOLATION N/A 2023    Procedure: Ablation Atrial Fibrillation;  Surgeon: Timur Corona MD;  Location: [facility] CV    HC REMOVE TONSILS/ADENOIDS,<13 Y/O      Description: Tonsillectomy With Adenoidectomy;  Recorded: 10/17/2012;    HC REVISE MEDIAN N/CARPAL TUNNEL SURG      Description: Neuroplasty Decompression Median Nerve At Carpal Tunnel;  Recorded: 10/17/2012;    ID ABLATE HEART DYSRHYTHM FOCUS      Description: Catheter Ablation Atrial Fibrillation;  Recorded: 2013;  Comments: PVI 3-7-2011 (PVI/cryo + CTI line); ; Repeat PVI 2011 (PVI/RF + CFE + Roof line + GREGORIO line)    ID ABLATE HEART DYSRHYTHM FOCUS      Description: Catheter Ablation Atrial Fibrillation;  Recorded: 2014;  Comments: PVI 3-7-2011 (PVI/cryo + CTI line); ; Repeat PVI 2011 (PVI/RF + CFE + Roof line + GREGORIO line)    ID CARDIOVERSION ELECTIVE ARRHYTHMIA EXTERNAL       Fib 10/17/12; ; Flutter 2/15/13; AFl 2019     Family History   Problem Relation Age of Onset    Aneurysm Mother         brain    Emphysema Father     Lung Cancer Sister     Hypertension Brother     Lung Cancer Sister     Lung Cancer Sister     Breast Cancer Sister     Social History     Socioeconomic History    Marital status:      Spouse name: Not on file    Number of children: 3    Years of education: Not on file    Highest education level: Not on file   Occupational History    Not on file   Tobacco Use    Smoking status: Former     Types: Cigarettes     Quit date: 1962     Years since quittin.7    Smokeless tobacco: Never   Substance and Sexual Activity    Alcohol  use: Yes     Comment: Alcoholic Drinks/day: 6 per month    Drug use: No    Sexual activity: Yes   Other Topics Concern    Not on file   Social History Narrative    Not on file     Social Determinants of Health     Financial Resource Strain: Not on file   Food Insecurity: Not on file   Transportation Needs: Not on file   Physical Activity: Not on file   Stress: Not on file   Social Connections: Not on file   Interpersonal Safety: Not on file   Housing Stability: Not on file          Medications  Allergies   Current Outpatient Medications   Medication Sig Dispense Refill    allopurinol (ZYLOPRIM) 100 MG tablet [ALLOPURINOL (ZYLOPRIM) 100 MG TABLET] Take 100 mg by mouth 2 (two) times a day.             atorvastatin (LIPITOR) 40 MG tablet Take 1 tablet by mouth daily      BD SUELLEN U/F 32G X 4 MM insulin pen needle       glipiZIDE (GLUCOTROL XL) 10 MG 24 hr tablet Take 2 tablets by mouth daily      Lancets (ONETOUCH DELICA PLUS [id number]) MISC Test once daily varying time,      ONETOUCH VERIO IQ test strip Test 3 times a day.      potassium chloride (KLOR-CON) 10 MEQ CR tablet [POTASSIUM CHLORIDE (KLOR-CON) 10 MEQ CR TABLET] Take 20 mEq by mouth 2 (two) times a day.      SOLIQUA pen Inject 50 Units Subcutaneous every morning (before breakfast)      spironolactone-hydrochlorothiazide (ALDACTAZIDE) 25-25 mg tablet Take by mouth daily Patient takes one-half tablet daily.      warfarin (COUMADIN) 2.5 MG tablet [WARFARIN (COUMADIN) 2.5 MG TABLET] Adjust dose based on INR results as directed.      gabapentin (NEURONTIN) 100 MG capsule Take 100 mg by mouth 2 times daily (Patient not taking: Reported on 11/24/2023)      pregabalin (LYRICA) 50 MG capsule Take 50 mg by mouth every evening      Allergies   Allergen Reactions    Pregabalin Other (See Comments)     Foggy thinking    Ace Inhibitors Cough    Empagliflozin Other (See Comments)     weakness    Irbesartan Other (See Comments)     exhaustion    Metformin GI Disturbance      "2013.  Retry 2016- gastrointestinal intolerance again    Pioglitazone Other (See Comments)     Hand leg and facial swelling         Lab Results    Chemistry/lipid CBC Cardiac Enzymes/BNP/TSH/INR   No results for input(s): \"CHOL\", \"HDL\", \"LDL\", \"TRIG\", \"CHOLHDLRATIO\" in the last 70249 hours.  No results for input(s): \"LDL\" in the last 46051 hours.  Recent Labs   Lab Test 09/06/23  1028   *   POTASSIUM 4.0   CHLORIDE 99   CO2 21*   *   BUN 20.2   CR 0.89   GFRESTIMATED 64   HOLLIS 9.4     Recent Labs   Lab Test 09/06/23  1028 08/31/23  0751 07/30/23  1000   CR 0.89 0.84 0.9     No results for input(s): \"A1C\" in the last 81468 hours. Recent Labs   Lab Test 09/06/23  1028   WBC 9.2   HGB 13.1   HCT 38.9   MCV 89        Recent Labs   Lab Test 09/06/23  1028 08/31/23  0751   HGB 13.1 13.4    No results for input(s): \"TROPONINI\" in the last 94103 hours.  No results for input(s): \"BNP\", \"NTBNPI\", \"NTBNP\" in the last 07007 hours.  No results for input(s): \"TSH\" in the last 12730 hours.  Recent Labs   Lab Test 09/15/23  0000 09/06/23  1028 08/23/23  1011   INR 2.6* 2.57* 3.2*        60 minutes spent on the date of encounter doing education, chart prep/review, review of outside records, review of test results, interpretation with above tests, patient visit, and documentation.      This note has been dictated using voice recognition software. Any grammatical or context distortions are unintentional and inherent to the software.    Millie Randall PA-C  Structural Heart Program  [facility]       Thank you for allowing me to participate in the care of your patient.      Sincerely,     Millie Randall PA-C     [facility]  cc:   No referring provider defined for this encounter.      "

## 2023-12-29 ENCOUNTER — TELEPHONE (OUTPATIENT)
Dept: CARDIOLOGY | Facility: CLINIC | Age: 83
End: 2023-12-29
Payer: COMMERCIAL

## 2023-12-29 DIAGNOSIS — I48.19 PERSISTENT ATRIAL FIBRILLATION (H): Primary | ICD-10-CM

## 2023-12-29 NOTE — TELEPHONE ENCOUNTER
Phone call to patient to discuss scheduling LAAC Procedure. Discussed pre and post procedure expectations, including appts the week of the procedure, need for responsible adult at home the night of the procedure, driving restrictions post-procedure, 6 week post-LAAC appt, and 3 month post-LAAC CT. Patient has been informed that if they are unable to find responsible adult and need overnight stay at hospital, there is a chance that their case will be cancelled day of depending on bed availability. Patient agreeable to plan moving forward. Would like to have LAAC procedure 3/13/2024 with Dr. Moon. Patient instructed that they should start taking Asa 81mg once daily 2 weeks prior to the procedure. Coordinator will call around that time to remind them to take medication. Informed patient scheduling will be in touch to finalize procedure appointments but that they may return call to writer at their leisure. Patient has writer's direct office number for further questions or concerns. No further questions or concerns at this time.  -SC

## 2023-12-29 NOTE — TELEPHONE ENCOUNTER
Call placed to patient. She is interested in moving forward with LAAC. She will need an SDM scheduled with any General Cardiologist before scheduling a date. Discussed that Dr. Corona is currently not available for any LAAC dates and discussed other providers with her. She was hard transferred to scheduling to arrange SDM. -SC    ----- Message -----  From: Millie Randall PA-C  Sent: 12/28/2023   2:56 PM CST  To: Hcc Left Atrial Appendage Closure [facility]    Ellyn Gaitan is interested in proceeding with Watchman procedure.  I believe she will still need an SDM visit prior to the procedure.  She would prefer to have the procedure with Dr. Corona as he has done her ablations in the past.  I think after the procedure she would be okay for a CT scan.    Her most recent echo showed LVEF of 60 to 65%    Thank you,  Millie

## 2024-01-04 NOTE — TELEPHONE ENCOUNTER
----- Message -----  From: Laurie Cleveland  Sent: 1/4/2024   1:08 PM CST  To: Kya Vela RN; *  Subject: RE: LAAC Schedule 3/13/24                        All set  ----- Message -----  From: Kya Vela RN  Sent: 12/29/2023  10:18 AM CST  To: Laurie Cleveland; *  Subject: LAAC Schedule 3/13/24                            Please call patient to schedule pre-ops, post-op, and LAAC procedure 3/13/24 with Dr. Owens.  Patient Does NOT have a device. SDM Will be completed 2/8 with Dr. Jamil.   Case request, intra-Op HALI, and 3 month post-LAAC CT orders in.   Thanks!  Kya CORRIGAN RN

## 2024-01-19 ENCOUNTER — TELEPHONE (OUTPATIENT)
Dept: CARDIOLOGY | Facility: CLINIC | Age: 84
End: 2024-01-19
Payer: COMMERCIAL

## 2024-01-19 NOTE — TELEPHONE ENCOUNTER
Incoming call from patient who had questions. Called back and LM for return call with direct number left for call back. -SC

## 2024-01-23 NOTE — TELEPHONE ENCOUNTER
Incoming call from patient. She would like resent information sheet for her appointments for her LAAC procedure. Will reprint and send to patient. -SC

## 2024-01-24 ENCOUNTER — HOSPITAL ENCOUNTER (OUTPATIENT)
Dept: CARDIOLOGY | Facility: HOSPITAL | Age: 84
Discharge: HOME | End: 2024-01-24
Attending: INTERNAL MEDICINE | Admitting: INTERNAL MEDICINE
Payer: COMMERCIAL

## 2024-01-24 DIAGNOSIS — I48.19 PERSISTENT ATRIAL FIBRILLATION (H): ICD-10-CM

## 2024-01-24 DIAGNOSIS — I48.4 ATYPICAL ATRIAL FLUTTER (H): ICD-10-CM

## 2024-01-24 DIAGNOSIS — I47.19 ATRIAL TACHYCARDIA (H): ICD-10-CM

## 2024-01-24 DIAGNOSIS — Z98.890 STATUS POST CATHETER ABLATION OF ATRIAL FIBRILLATION: ICD-10-CM

## 2024-01-24 PROCEDURE — 93225 XTRNL ECG REC<48 HRS REC: CPT

## 2024-01-27 PROCEDURE — 93227 XTRNL ECG REC<48 HR R&I: CPT | Performed by: INTERNAL MEDICINE

## 2024-02-01 ENCOUNTER — NURSE TRIAGE (OUTPATIENT)
Dept: CARDIOLOGY | Facility: CLINIC | Age: 84
End: 2024-02-01
Payer: COMMERCIAL

## 2024-02-01 DIAGNOSIS — I48.4 ATYPICAL ATRIAL FLUTTER (H): ICD-10-CM

## 2024-02-01 DIAGNOSIS — I48.19 PERSISTENT ATRIAL FIBRILLATION (H): Primary | ICD-10-CM

## 2024-02-01 NOTE — TELEPHONE ENCOUNTER
Ellyn called because she has been in A-fib for about a week. At first she thought her symptoms were from an infection but that is cleared up and she is still in A-fib. HR ranges from 90's-105 with oximeter. Goes up just walking from room to room in the house. She is very tired. She is not on any antiarrhythmic medication. She has Sotalol because she was on it previously. I asked her not to take it. Please call her with recommendations from Dr. Corona.       Reason for Disposition   Skipped or extra beat(s) and increases with exercise or exertion   Skipped or extra beat(s) and occurs 4 or more times per minute   Age > 60 years  (Exception: Brief heartbeat symptoms that went away and now feels well.)   Taking water pill (i.e., diuretic) or heart medication (e.g., digoxin)   Patient wants to be seen    Additional Information   Negative: Passed out (i.e., lost consciousness, collapsed and was not responding)   Negative: Shock suspected (e.g., cold/pale/clammy skin, too weak to stand, low BP, rapid pulse)   Negative: Difficult to awaken or acting confused (e.g., disoriented, slurred speech)   Negative: Visible sweat on face or sweat dripping down face   Negative: Unable to walk, or can only walk with assistance (e.g., requires support)   Negative: Received SHOCK from implantable cardiac defibrillator and has persisting symptoms (i.e., palpitations, lightheadedness)   Negative: Dizziness, lightheadedness, or weakness and heart beating very rapidly (e.g., > 140 / minute)   Negative: Dizziness, lightheadedness, or weakness and heart beating very slowly (e.g., < 50 / minute)   Negative: Sounds like a life-threatening emergency to the triager   Negative: Chest pain   Negative: Difficulty breathing   Negative: Dizziness, lightheadedness, or weakness   Negative: Heart beating very rapidly (e.g., > 140 / minute) and present now  (Exception: During exercise.)   Negative: Heart beating very slowly (e.g., < 50 / minute)  (Exception:  Athlete and heart rate normal for caller.)   Negative: New or worsened shortness of breath with activity (dyspnea on exertion)   Negative: Patient sounds very sick or weak to the triager   Negative: Wearing a 'Holter monitor' or 'cardiac event monitor'   Negative: Received SHOCK from implantable cardiac defibrillator (and now feels well)   Negative: Heart beating very rapidly (e.g., > 140 / minute) and not present now  (Exception: During exercise.)   Negative: History of heart disease (i.e., heart attack, bypass surgery, angina, angioplasty)  (Exception: Brief heartbeat symptoms that went away and now feels well.)    Protocols used: Heart Rate and Heartbeat [id number]

## 2024-02-02 ENCOUNTER — LAB (OUTPATIENT)
Dept: CARDIOLOGY | Facility: CLINIC | Age: 84
End: 2024-02-02
Payer: COMMERCIAL

## 2024-02-02 ENCOUNTER — TELEPHONE (OUTPATIENT)
Dept: CARDIOLOGY | Facility: CLINIC | Age: 84
End: 2024-02-02
Payer: COMMERCIAL

## 2024-02-02 DIAGNOSIS — I48.19 PERSISTENT ATRIAL FIBRILLATION (H): Primary | ICD-10-CM

## 2024-02-02 PROCEDURE — 93000 ELECTROCARDIOGRAM COMPLETE: CPT | Performed by: INTERNAL MEDICINE

## 2024-02-02 NOTE — TELEPHONE ENCOUNTER
Spoke with pt letting her know Dr. Corona is out of the office and message was sent to his team for review and recommendations yesterday. Once recommendations are received we will call pt back. Pt verbalized understanding.    Fiona Hernández RN

## 2024-02-02 NOTE — PROGRESS NOTES
Pt presents today for EKG to verify cardiac rhythm after feeling that she was in afib for about a week.   Pt states she feels fatigued with afib and that she does not have her usual energy.     EKG shows Afib @83.    Pt continues Warfarin, is on no antiarrythmic medication.    INR's:    2-2     2.3  1-25   2.0  1-22   2.1    11-30 2.2  10-23 2.4    Pt has a hx of persistent afib, s/p PVI 9-6-23 with SWA.  States she usually needs a cardioversion.  She is scheduled for LAAO on 3-13-24.    Will review with Chetna Hunter and call patient with recommendations.

## 2024-02-02 NOTE — CONFIDENTIAL NOTE
Spoke with pt and discussed recommendation to come in for an EKG to confirm rhythm. Pt verbalized understanding. She would like to have EKG on Monday as this would work better for her than today. Order placed and sent to scheduling to set up.     Fiona Hernández RN

## 2024-02-02 NOTE — TELEPHONE ENCOUNTER
[facility] Call Center    Phone Message    May a detailed message be left on voicemail: yes     Reason for Call: Other: Ellyn called because she did not get a response from her team yesterday after speaking with a triage nurse. Please reach out to Ellyn with any recommendations or updates. Please see Nurse Triage message from 2/1 for more information. Thank you!     Action Taken: Other: Cardiology    Travel Screening: Not Applicable    Thank you!  [facility]

## 2024-02-05 ENCOUNTER — TELEPHONE (OUTPATIENT)
Dept: CARDIOLOGY | Facility: CLINIC | Age: 84
End: 2024-02-05
Payer: COMMERCIAL

## 2024-02-05 NOTE — PROGRESS NOTES
Chetna Hunter, NP  You3 days ago     KD  Okay to schedule for cardioversion, start sotalol 80 mg twice daily 2 days prior to cardioversion. Tolerated this in the past. LVEF meets guidelines for Sotalol.    Chetna Lala

## 2024-02-05 NOTE — TELEPHONE ENCOUNTER
Message previously sent to Rajani Mcgrath, will await response and call patient with recommendations.

## 2024-02-05 NOTE — PROGRESS NOTES
Noted.  Phone call to patient.  Discussed INR's and that she did not have one from December, will have to do weekly INR's for 4 in order to proceed with CV.  Pt state that due to her INR levels being so stable, she was instructed to come to the clinic only every 6-8 weeks and that is why she missed December.    Will review with Rajani Mcgrath and call patient with recommendations.

## 2024-02-05 NOTE — TELEPHONE ENCOUNTER
[facility] Call Center    Phone Message    May a detailed message be left on voicemail: yes     Reason for Call: Other: Ellyn called requesting an update from her team regarding her INR appts and scheduling her cardiogram. Please see 2/2 Lab chart entry for more information and reach out to Ellyn to discuss. Thank you!     Action Taken: Other: Cardiology    Travel Screening: Not Applicable    Thank you!  [facility]

## 2024-02-06 ENCOUNTER — DOCUMENTATION ONLY (OUTPATIENT)
Dept: CARDIOLOGY | Facility: CLINIC | Age: 84
End: 2024-02-06
Payer: COMMERCIAL

## 2024-02-06 ENCOUNTER — PREP FOR PROCEDURE (OUTPATIENT)
Dept: CARDIOLOGY | Facility: CLINIC | Age: 84
End: 2024-02-06
Payer: COMMERCIAL

## 2024-02-06 DIAGNOSIS — I48.19 PERSISTENT ATRIAL FIBRILLATION (H): Primary | ICD-10-CM

## 2024-02-06 RX ORDER — LIDOCAINE 40 MG/G
CREAM TOPICAL
Status: CANCELLED | OUTPATIENT
Start: 2024-02-06

## 2024-02-06 RX ORDER — SOTALOL HYDROCHLORIDE 80 MG/1
80 TABLET ORAL 2 TIMES DAILY
Qty: 60 TABLET | Refills: 6 | Status: SHIPPED | OUTPATIENT
Start: 2024-02-06 | End: 2024-02-08 | Stop reason: ALTCHOICE

## 2024-02-06 NOTE — PROGRESS NOTES
Noted.  Phone call to patient, reviewed the below information.  Chart prepped and orders placed, she will be contacted for Cardioversion.  She states understanding.

## 2024-02-06 NOTE — PROGRESS NOTES
H&P  PMD: [x]  Received [] Card OV: []  Date:  Sent LM  []  Teach  []   Orders  I [x] P  [x]  Letter []   AC: Coumadin NP Med Review: CHANGES- start Sotalol 80 mg bid 2 days prior    DM Meds:  Glipizide  GLP-1:None       Yvonne Fatima, 1940, [id number]  Home:[phone] (home) Cell:[phone] (mobile)  Emergency Contact: Jesus Fatima [phone]  PCP: Juan Mcmanus, [phone]    +++Important patient information for [facility]/Cath Lab staff : None+++    [facility] EP Cath Lab Procedure Order   Procedure: Cardioversion for AF  Ordering Provider: Chetna Hunter NP  Date Ordered and Prepped: 2/6/2024 Grisel Adame RN  Anticipated Case Duration:  Standard  Scheduling Needs/Timeframe:  Urgent-Next available spot  Scheduling Contact: Please contact pt to schedule  Cardiology Follow Up Apt s/p: Standard- EP HEAVEN @ 4-6 wks or previously scheduled General Card apt  Current Device/Device Co Needed for Procedure: None NoneNone  Pre-Procedural Testing needed: None  Anesthesia:  General    [facility] EP Cath Lab Prep   H&P:  Pt to schedule with PMD to complete  Pre-op Labs: K if pt taking diuretic medication or hx of low Potassium, Beta HcG if appropriate, and INR if on Warfarin will be ordered AM of procedure and Review of most recent labs, WEL for procedure  T&S Pre-Procedure Review: T&S is not required for DCCV/DFT Testing  Medical Records Pertinent for Procedure:  None  Iodinated Contrast Dye Allergies (Does not include Shellfish, Egg, and/or Iodine Allergy): NA  GLP-1 Protocol: If on Dulaglutide (Trulicity) (weekly)- Injection hold 7 days prior to procedure  , Exenatide extended release (Bydureon bcise) (weekly)- Injection hold 7 days prior to procedure, Exenatide (Byetta) (twice daily)- Oral Tablet hold day prior and morning of procedure and for Injection hold 7 days prior to procedure, Semaglutide (Ozempic) (weekly)- Injection and Oral hold 7 days prior to procedure, Liraglutide (Victoza, Saxenda) (daily)- Injection hold  day prior and morning of procedure    Allergies   Allergen Reactions    Pregabalin Other (See Comments)     Foggy thinking    Ace Inhibitors Cough    Empagliflozin Other (See Comments)     weakness    Irbesartan Other (See Comments)     exhaustion    Metformin GI Disturbance     2013.  Retry 2016- gastrointestinal intolerance again    Pioglitazone Other (See Comments)     Hand leg and facial swelling       Current Outpatient Medications:     allopurinol (ZYLOPRIM) 100 MG tablet, [ALLOPURINOL (ZYLOPRIM) 100 MG TABLET] Take 100 mg by mouth 2 (two) times a day.       , Disp: , Rfl:     atorvastatin (LIPITOR) 40 MG tablet, Take 1 tablet by mouth daily, Disp: , Rfl:     BD SUELLEN U/F 32G X 4 MM insulin pen needle, , Disp: , Rfl:     gabapentin (NEURONTIN) 100 MG capsule, Take 100 mg by mouth 2 times daily (Patient not taking: Reported on 11/24/2023), Disp: , Rfl:     glipiZIDE (GLUCOTROL XL) 10 MG 24 hr tablet, Take 2 tablets by mouth daily, Disp: , Rfl:     Lancets (ONETOUCH DELICA PLUS [id number]) MISC, Test once daily varying time,, Disp: , Rfl:     ONETOUCH VERIO IQ test strip, Test 3 times a day., Disp: , Rfl:     potassium chloride (KLOR-CON) 10 MEQ CR tablet, [POTASSIUM CHLORIDE (KLOR-CON) 10 MEQ CR TABLET] Take 20 mEq by mouth 2 (two) times a day., Disp: , Rfl:     pregabalin (LYRICA) 50 MG capsule, Take 50 mg by mouth every evening, Disp: , Rfl:     SOLIQUA pen, Inject 50 Units Subcutaneous every morning (before breakfast), Disp: , Rfl:     spironolactone-hydrochlorothiazide (ALDACTAZIDE) 25-25 mg tablet, Take by mouth daily Patient takes one-half tablet daily., Disp: , Rfl:     warfarin (COUMADIN) 2.5 MG tablet, [WARFARIN (COUMADIN) 2.5 MG TABLET] Adjust dose based on INR results as directed., Disp: , Rfl:     Documentation Date:2/6/2024 9:39 AM  Grisel Adame RN

## 2024-02-06 NOTE — PROGRESS NOTES
Charisma Freeman, TEODORO CNP  Hcc Ep Support Pool - Lhe16 hours ago (4:45 PM)     CL  Reviewed in Rajani's absence.  INRs have been very stable, okay to proceed with DCCV.  INR must be > 2.0 at time of DCCV.  Thanks,  Charisma      You  Rajani Mcgrath APRN CNP22 hours ago (10:57 AM)     CJ  Rajani,  Can you please review the below notes, pt with stable INR's and was told to do 6-8 weeks?  She is also scheduled for LAAO on 3-13-24.  Thank you!  Grisel

## 2024-02-07 ENCOUNTER — TELEPHONE (OUTPATIENT)
Dept: CARDIOLOGY | Facility: CLINIC | Age: 84
End: 2024-02-07
Payer: COMMERCIAL

## 2024-02-07 NOTE — TELEPHONE ENCOUNTER
Pre-Procedure Education    Procedure: DCCV with Rajani Mcgrath NP on 2/14/2024 with arrival time 8:30 am    PT IS ON COUMADIN INR'S IN EPIC  PT WILL HAVE PRE OP 2/8 AND GET INR THEN    2/2=2.3  1/25==2.0  1/22=2.1    COVID: COVID policy- if pt develops COVID like symptoms prior to procedure, he/she would need to complete an at home with a rapid antigen COVID test 1-2 days prior to your procedure date. If COVID + pt is aware the procedure will need to be rescheduled, and to contact CV scheduling as soon as possible    Pre-Op H&P: scheduled on 2/8/2024 DR DINH - Will request upon completion    Education:     PT HAS A  FOR PROCEDURE THAT WILL STAY WITH HER    PT INSTRUCTED TO HOLD ANY VITAMINS, MINERALS CALCIUM IRON OR SUPPLEMENTS  THE MORNING OF CV  PT INSTRUCTED NO GUM CHEWING MINTS OR CANDY THE MORNING OF CV  PT INSTRUCTED TO BATHE OR SHOWER BEFORE COMING IN  PT INSTRUCTED TO LEAVE JEWELRY AT HOME PT IS ON COUMADIN  PT INSTRUCTED TO START SOTALOL 80 MG Q 12 HRS  2 DAYS PRIOR CV   PT IS DIABETIC AND INSTRUCTED TO HOLD HER GLIPIZIDE THE MORNING OF CV  PT HAS A POST CV FOLLOW UP WITH JO ANN 3/11    Contact: Reviewed via phone with pt    Pre-Procedure Instruction: NPO after midnight pre procedure, Defined NPO with pt, Remove all jewelry and leave all valuables at home, Shower prior to arrival, Sedation plan/orders, Transportation requirements and arrangements post procedure, Post-procedure follow up process, Post-procedure restrictions/expectations, and Pre-procedure letter sent- letter tab  Risks:      Medication:   Instructions regarding anticoagulants: Coumadin- To continue anticoagulation uninterrupted through their procedure    Instructions regarding antiarrhythmic medication: Sotalol;  PT INSTRUCTED TO START SOTALOL 80 MG Q 12 HRS 2 DAYS PRIOR CV    Instructions given to pt regarding diuretics medication: NA for DCCV  Instructions given to pt regarding DM/GLP-1 medication:   DM-  PT INSTRUCTED  TO HOLD  HER GLIPIZIDE THE MORNING OF CV   GLP-1- None    Instructions for medication, other than anticoagulants and antiarrhythmics listed above, given to pt: Take all medication AM of procedure with small sips of water     Important patient information for staff:  PT IS DIABETIC AND SOTALOL START 2/12 2/7/2024 1:38 PM  Luna Valencia LPN

## 2024-02-08 ENCOUNTER — OFFICE VISIT (OUTPATIENT)
Dept: CARDIOLOGY | Facility: CLINIC | Age: 84
End: 2024-02-08
Payer: COMMERCIAL

## 2024-02-08 ENCOUNTER — PREP FOR PROCEDURE (OUTPATIENT)
Dept: CARDIOLOGY | Facility: CLINIC | Age: 84
End: 2024-02-08

## 2024-02-08 ENCOUNTER — TELEPHONE (OUTPATIENT)
Dept: CARDIOLOGY | Facility: CLINIC | Age: 84
End: 2024-02-08

## 2024-02-08 ENCOUNTER — LAB (OUTPATIENT)
Dept: LAB | Facility: CLINIC | Age: 84
End: 2024-02-08
Payer: COMMERCIAL

## 2024-02-08 VITALS
WEIGHT: 144.7 LBS | OXYGEN SATURATION: 97 % | SYSTOLIC BLOOD PRESSURE: 137 MMHG | DIASTOLIC BLOOD PRESSURE: 69 MMHG | HEIGHT: 61 IN | BODY MASS INDEX: 27.32 KG/M2 | TEMPERATURE: 98.2 F | HEART RATE: 61 BPM | RESPIRATION RATE: 16 BRPM

## 2024-02-08 DIAGNOSIS — I10 HYPERTENSION, UNSPECIFIED TYPE: ICD-10-CM

## 2024-02-08 DIAGNOSIS — I25.10 CORONARY ARTERY DISEASE INVOLVING NATIVE CORONARY ARTERY OF NATIVE HEART WITHOUT ANGINA PECTORIS: Primary | ICD-10-CM

## 2024-02-08 DIAGNOSIS — Z00.00 ROUTINE GENERAL MEDICAL EXAMINATION AT A HEALTH CARE FACILITY: Primary | ICD-10-CM

## 2024-02-08 DIAGNOSIS — I48.19 PERSISTENT ATRIAL FIBRILLATION (H): ICD-10-CM

## 2024-02-08 DIAGNOSIS — E78.5 DYSLIPIDEMIA: ICD-10-CM

## 2024-02-08 LAB
ATRIAL RATE - MUSE: 80 BPM
ATRIAL RATE - MUSE: NORMAL BPM
DIASTOLIC BLOOD PRESSURE - MUSE: NORMAL MMHG
DIASTOLIC BLOOD PRESSURE - MUSE: NORMAL MMHG
INTERPRETATION ECG - MUSE: NORMAL
INTERPRETATION ECG - MUSE: NORMAL
P AXIS - MUSE: NORMAL DEGREES
P AXIS - MUSE: NORMAL DEGREES
PR INTERVAL - MUSE: NORMAL MS
PR INTERVAL - MUSE: NORMAL MS
QRS DURATION - MUSE: 80 MS
QRS DURATION - MUSE: 94 MS
QT - MUSE: 372 MS
QT - MUSE: 386 MS
QTC - MUSE: 437 MS
QTC - MUSE: 453 MS
R AXIS - MUSE: 33 DEGREES
R AXIS - MUSE: 42 DEGREES
SYSTOLIC BLOOD PRESSURE - MUSE: NORMAL MMHG
SYSTOLIC BLOOD PRESSURE - MUSE: NORMAL MMHG
T AXIS - MUSE: 17 DEGREES
T AXIS - MUSE: 41 DEGREES
VENTRICULAR RATE- MUSE: 83 BPM
VENTRICULAR RATE- MUSE: 83 BPM

## 2024-02-08 PROCEDURE — 93000 ELECTROCARDIOGRAM COMPLETE: CPT | Performed by: GENERAL ACUTE CARE HOSPITAL

## 2024-02-08 PROCEDURE — 99214 OFFICE O/P EST MOD 30 MIN: CPT | Performed by: INTERNAL MEDICINE

## 2024-02-08 RX ORDER — PANTOPRAZOLE SODIUM 40 MG/1
40 TABLET, DELAYED RELEASE ORAL DAILY
Status: ON HOLD | COMMUNITY
Start: 2024-01-30 | End: 2024-04-10

## 2024-02-08 RX ORDER — DULOXETIN HYDROCHLORIDE 30 MG/1
30 CAPSULE, DELAYED RELEASE ORAL DAILY
Status: ON HOLD | COMMUNITY
Start: 2023-12-09 | End: 2024-04-10

## 2024-02-08 RX ORDER — SEMAGLUTIDE 1.34 MG/ML
0.25 INJECTION, SOLUTION SUBCUTANEOUS
COMMUNITY
Start: 2024-01-16 | End: 2024-02-28

## 2024-02-08 RX ORDER — INSULIN DEGLUDEC 100 U/ML
20 INJECTION, SOLUTION SUBCUTANEOUS
COMMUNITY
Start: 2024-01-16

## 2024-02-08 NOTE — PROGRESS NOTES
"       [facility]   [address], [place] [address]   [email or web address]   OFFICE: [phone]          Thank you Dr. Randall for asking the [facility] Heart [facility] team to participate in the care of your patient, Yvonne Fatima.     Impression and Plan     1.  Atrial fibrillation (persistent).  Atrial fibrillation has been a chronic issue for her and she.  It was diagnosed prior to 2010.  Documentation by Dr. Timur Corona indicates that she has undergone multiple ablations most recently 9 September 2023 (focal AT, confirmed prior PVI + GREGORIO line + roofline + CTI line).  She has had some issues with instability and falling.  She is interested in pursuing left atrial appendage occlusion device placement and workup has already been initiated.    Earlier this month, and she was noted to have some irregularity in her heart rhythm.  She had ECG performed on 2 February 2024 that was reported as \"atrial fibrillation\".  She was scheduled to have cardioversion on 14 February 2024 with sotalol to start 48  prior.  I personally reviewed ECG and ECG actually reveals sinus rhythm with PACs though admittedly with low P-wave amplitude (formal ECG readout was corrected this morning to reflect sinus rhythm).    Repeat ECG today x 2 also confirms sinus rhythm with occasional PACs (1 performed at double standard to amplify P waves).     Today, we spent the lions share of the visit discussing left atrial appendage occlusion device placement.  She is interested in pursuing this (see formal Shared Decision Making documentation below).  Plan:  Given the aforementioned, we will cancel plans for cardioversion.  Will also cancel plans for sotalol initiation.  Continue warfarin.  Forge ahead with workup & plan for left atrial appendage occlusion device placement.    2.  Coronary artery disease.  Ellyn has coronary artery disease by virtue of CT scan 30 July 2023 revealing moderate burden " "of multivessel coronary artery calcification (see Cardiac Diagnostics section below).    On interview, Ellyn denies any chest pain or other symptoms concerning for angina.    3.  Hypertension.  Blood pressure is fairly reasonable in the office today.    5.  Dyslipidemia.  Lipid profile 8 December 2023 revealed LDL 51 mg/dL and HDL 41 mg/dL.  Continue atorvastatin.    4.  Obstructive sleep apnea.  Patient reports consistent use of CPAP.    35 minutes spent reviewing prior records (including documentation, laboratory studies, cardiac testing/imaging), interview with patient along with physical exam, planning, and subsequent documentation/crafting of note).       Shared Decision Making     \"Miller" has documented nonvalvular atrial fibrillation (NVAF) and is currently on oral anticoagulant therapy warfarin.  SANJAY?DS?-VASc = 8 (age of 83, ?, HTN, TIA, CAD, and diabetes mellitus type 2).  HAS-BLED risk score: 3 (age of 83, bleeding predisposition, and TIA). Indications to consider non-oral anticoagulation therapy: Bleeding predisposition and propensity for falling.  \"Miller" has no contra-indication to come off oral anti-coagulant therapy if LAAC device is successfully placed.      I have personally reviewed \"Miller"'s chart and discussed the following with \"Miller"/family; 1) The choices available for reducing stroke risk from atrial fibrillation, 2) Treatment options available including respective risk/benefits, and 3) What factors are most important for the patient in making their decision.  The [facility] shared decision making https://www.cardiosmart.org/SDM/Decision-Aids/Find-Decision-Aids/Atrial-Fibrillation was used to guide this conversation.  \"Miller" was counseled that their decision could be made at this time or in the future if more time was needed to consider their decision.      \"Miller" is felt to be an appropriate candidate to proceed with left atrial appendage screening and implant.         History of Present Illness " "   Once again I would like to thank you again for asking me to participate in the care of your patient, Yvonne Fatima.  As you know, but to reiterate for my own records, Yvonne Fatima is a 83 year old female historically followed [facility].    She has a history of persistent atrial fibrillation. Atrial fibrillation has been a chronic issue for her.It was diagnosed prior to 2010.  Documentation by Dr. Timur Corona indicates that she has undergone multiple ablations most recently 9 September 2023  (focal AT, confirmed prior PVI + GREGORIO line + roofline + CTI line).  She has had some issues with instability and falling.    Earlier this month, and she was noted to have some irregularity in her heart rhythm.  She had ECG performed 2 February 2024 that was reported as \"atrial fibrillation\".  She was to have cardioversion 14 February 2024.    On interview, she reported that she had some \"GI issues\" earlier in the month though these have now resolved.  Presently states she is at her baseline and feeling comfortable.  She minimizes any subjective palpitations.  Breathing has been fairly comfortable.  No chest pain symptoms.    Further review of systems is otherwise negative/noncontributory (medical record and 13 point review of systems reviewed as well and pertinent positives noted).         Cardiac Diagnostics      Echocardiogram 26 June 2023:  Normal left ventricular size and systolic performance with ejection fraction of 60-65%.  Mild mitral stenosis.  Moderate tricuspid insufficiency.  Mild right ventricular enlargement with normal right ventricular systolic performance.  Moderate biatrial enlargement.  Right ventricular systolic pressure relative to right atrial pressure is mildly increased.  The pulmonary artery pressure is estimated to be 40-45 mmHg plus right atrial pressure.    Holter monitor 24 January 2024:  Holter monitoring (duration 24hrs).  Continuous atrial fibrillation, 64 to 122bpm, average " "82bpm.  There were no pauses of greater than 3 seconds.  Rare premature ventricular contractions (2%).  Symptom triggers - none.     CT scan 30 July 2023:  Pulmonary vein measurements as detailed below. There are two right pulmonary veins and two left pulmonary veins. There is early branching of the right superior and right inferior pulmonary veins. There is circumferential calcification and mild stenosis of the ostium of the left inferior pulmonary vein.  No thrombus in the left atrium or left atrial appendage.  Normal left atrial size. Moderate right atrial enlargement.  Mild right ventricular enlargement.  Moderate burden of multivessel coronary artery calcification. Study was not tailored to evaluate for coronary artery stenosis.    Twelve-lead ECGs x 2 (personally reviewed) 8 February 2024: Sinus rhythm with PACs.    Twelve-lead ECG (personally reviewed) 2 February 2024: Sinus rhythm with PACs.           Physical Examination       /69 (BP Location: Left arm, Patient Position: Sitting, Cuff Size: Adult Regular)   Pulse 61   Temp 98.2  F (36.8  C) (Oral)   Resp 16   Ht 1.549 m (5' 1\")   Wt 65.6 kg (144 lb 11.2 oz)   SpO2 97%   BMI 27.34 kg/m          Wt Readings from Last 3 Encounters:   02/08/24 65.6 kg (144 lb 11.2 oz)   12/28/23 65.3 kg (144 lb)   11/24/23 64.4 kg (142 lb)     The patient is alert and oriented times three. Sclerae are anicteric. Mucosal membranes are moist. Jugular venous pressure is normal. No significant adenopathy/thyromegally appreciated. Lungs are clear with good expansion. On cardiovascular exam, the patient has slightly irregular 1 and S2. Abdomen is soft and non-tender. Extremities reveal no clubbing, cyanosis, or edema.         Family History/Social History/Risk Factors   Patient does not smoke.  Family history reviewed, and family history includes Aneurysm in her mother; Breast Cancer in her sister; Emphysema in her father; Hypertension in her brother; Lung Cancer in her " sister, sister, and sister.          Medical History  Surgical History Family History Social History   Past Medical History:   Diagnosis Date    Atrial fibrillation (H)     [id number] score of 7-on chronic warfarin Previously failed Sotalol and Multaq therapy PVI 3/11 Repeat PVI 7/11  Recurrent A fib, most recent CV Feb 2013 Rx sotalol     Atrial flutter (H)     Atypical flutter with RVR after second PVI in July 2011      Esophageal reflux     Created by Conversion     Essential hypertension          Status post catheter ablation of atrial fibrillation 6/3/2019    PVI 3/11(PVI/Cryo + CTI line) Repeat PVI 7/11 (PVI/RF + CFE + Roof line + GREGORIO line)     Past Surgical History:   Procedure Laterality Date    EP ABLATION PULMONARY VEIN ISOLATION N/A 9/6/2023    Procedure: Ablation Atrial Fibrillation;  Surgeon: Timur Corona MD;  Location: [facility] LAB CV    HC REMOVE TONSILS/ADENOIDS,<13 Y/O      Description: Tonsillectomy With Adenoidectomy;  Recorded: 10/17/2012;    HC REVISE MEDIAN N/CARPAL TUNNEL SURG      Description: Neuroplasty Decompression Median Nerve At Carpal Tunnel;  Recorded: 10/17/2012;    VA ABLATE HEART DYSRHYTHM FOCUS      Description: Catheter Ablation Atrial Fibrillation;  Recorded: 06/20/2013;  Comments: PVI 3-7-2011 (PVI/cryo + CTI line); ; Repeat PVI July 2011 (PVI/RF + CFE + Roof line + GREGORIO line)    VA ABLATE HEART DYSRHYTHM FOCUS      Description: Catheter Ablation Atrial Fibrillation;  Recorded: 08/05/2014;  Comments: PVI 3-7-2011 (PVI/cryo + CTI line); ; Repeat PVI July 2011 (PVI/RF + CFE + Roof line + GREGORIO line)    VA CARDIOVERSION ELECTIVE ARRHYTHMIA EXTERNAL       Fib 10/17/12; ; Flutter 2/15/13; AFl 11/11/2019     Family History   Problem Relation Age of Onset    Aneurysm Mother         brain    Emphysema Father     Lung Cancer Sister     Hypertension Brother     Lung Cancer Sister     Lung Cancer Sister     Breast Cancer Sister         Social History     Socioeconomic History     Marital status:      Spouse name: Not on file    Number of children: 3    Years of education: Not on file    Highest education level: Not on file   Occupational History    Not on file   Tobacco Use    Smoking status: Former     Types: Cigarettes     Quit date: 1962     Years since quittin.8    Smokeless tobacco: Never   Vaping Use    Vaping Use: Never used   Substance and Sexual Activity    Alcohol use: Yes     Comment: Alcoholic Drinks/day: 6 per month    Drug use: No    Sexual activity: Yes     Partners: Male   Other Topics Concern    Not on file   Social History Narrative    Not on file     Social Determinants of Health     Financial Resource Strain: Not on file   Food Insecurity: Not on file   Transportation Needs: Not on file   Physical Activity: Not on file   Stress: Not on file   Social Connections: Not on file   Interpersonal Safety: Not on file   Housing Stability: Not on file           Medications  Allergies   Current Outpatient Medications   Medication Sig Dispense Refill    allopurinol (ZYLOPRIM) 100 MG tablet [ALLOPURINOL (ZYLOPRIM) 100 MG TABLET] Take 100 mg by mouth 2 (two) times a day.             atorvastatin (LIPITOR) 40 MG tablet Take 1 tablet by mouth daily      BD SUELLEN U/F 32G X 4 MM insulin pen needle       DULoxetine (CYMBALTA) 30 MG capsule Take 30 mg by mouth daily      gabapentin (NEURONTIN) 100 MG capsule Take 100 mg by mouth 2 times daily      glipiZIDE (GLUCOTROL XL) 10 MG 24 hr tablet Take 2 tablets by mouth daily      insulin degludec (TRESIBA FLEXTOUCH) 100 UNIT/ML pen Inject 20 Units Subcutaneous      Lancets (ONETOUCH DELICA PLUS [id number]) MISC Test once daily varying time,      ONETOUCH VERIO IQ test strip Test 3 times a day.      pantoprazole (PROTONIX) 40 MG EC tablet Take 40 mg by mouth daily      potassium chloride (KLOR-CON) 10 MEQ CR tablet [POTASSIUM CHLORIDE (KLOR-CON) 10 MEQ CR TABLET] Take 20 mEq by mouth 2 (two) times a day.      semaglutide (OZEMPIC,  "0.25 OR 0.5 MG/DOSE,) 2 MG/1.5ML SOPN pen Inject 0.25 mg Subcutaneous      SOLIQUA pen Inject 50 Units Subcutaneous every morning (before breakfast)      sotalol (BETAPACE) 80 MG tablet Take 1 tablet (80 mg) by mouth 2 times daily Start 2 days prior to cardioversion on 2-12-24 60 tablet 6    spironolactone-hydrochlorothiazide (ALDACTAZIDE) 25-25 mg tablet Take by mouth daily Patient takes one-half tablet daily.      warfarin (COUMADIN) 2.5 MG tablet [WARFARIN (COUMADIN) 2.5 MG TABLET] Adjust dose based on INR results as directed.      pregabalin (LYRICA) 50 MG capsule Take 50 mg by mouth every evening         Allergies   Allergen Reactions    Pregabalin Other (See Comments)     Foggy thinking    Ace Inhibitors Cough    Empagliflozin Other (See Comments)     weakness    Irbesartan Other (See Comments)     exhaustion    Metformin GI Disturbance     2013.  Retry 2016- gastrointestinal intolerance again    Pioglitazone Other (See Comments)     Hand leg and facial swelling          Lab Results    Chemistry/lipid CBC Cardiac Enzymes/BNP/TSH/INR   No results for input(s): \"CHOL\", \"HDL\", \"LDL\", \"TRIG\", \"CHOLHDLRATIO\" in the last 36768 hours.  No results for input(s): \"LDL\" in the last 65762 hours.  Recent Labs   Lab Test 09/06/23  1028   *   POTASSIUM 4.0   CHLORIDE 99   CO2 21*   *   BUN 20.2   CR 0.89   GFRESTIMATED 64   HOLLIS 9.4     Recent Labs   Lab Test 09/06/23  1028 08/31/23  0751 07/30/23  1000   CR 0.89 0.84 0.9     No results for input(s): \"A1C\" in the last 98082 hours.       Recent Labs   Lab Test 09/06/23  1028   WBC 9.2   HGB 13.1   HCT 38.9   MCV 89        Recent Labs   Lab Test 09/06/23  1028 08/31/23  0751   HGB 13.1 13.4    No results for input(s): \"TROPONINI\" in the last 01260 hours.  No results for input(s): \"BNP\", \"NTBNPI\", \"NTBNP\" in the last 21439 hours.  No results for input(s): \"TSH\" in the last 68899 hours.  Recent Labs   Lab Test 09/15/23  0000 09/06/23  1028 08/23/23  1011   INR " 2.6* 2.57* 3.2*          Medications  Allergies   Current Outpatient Medications   Medication Sig Dispense Refill    allopurinol (ZYLOPRIM) 100 MG tablet [ALLOPURINOL (ZYLOPRIM) 100 MG TABLET] Take 100 mg by mouth 2 (two) times a day.             atorvastatin (LIPITOR) 40 MG tablet Take 1 tablet by mouth daily      BD SUELLEN U/F 32G X 4 MM insulin pen needle       DULoxetine (CYMBALTA) 30 MG capsule Take 30 mg by mouth daily      gabapentin (NEURONTIN) 100 MG capsule Take 100 mg by mouth 2 times daily      glipiZIDE (GLUCOTROL XL) 10 MG 24 hr tablet Take 2 tablets by mouth daily      insulin degludec (TRESIBA FLEXTOUCH) 100 UNIT/ML pen Inject 20 Units Subcutaneous      Lancets (ONETOUCH DELICA PLUS [id number]) MISC Test once daily varying time,      ONETOUCH VERIO IQ test strip Test 3 times a day.      pantoprazole (PROTONIX) 40 MG EC tablet Take 40 mg by mouth daily      potassium chloride (KLOR-CON) 10 MEQ CR tablet [POTASSIUM CHLORIDE (KLOR-CON) 10 MEQ CR TABLET] Take 20 mEq by mouth 2 (two) times a day.      semaglutide (OZEMPIC, 0.25 OR 0.5 MG/DOSE,) 2 MG/1.5ML SOPN pen Inject 0.25 mg Subcutaneous      SOLIQUA pen Inject 50 Units Subcutaneous every morning (before breakfast)      sotalol (BETAPACE) 80 MG tablet Take 1 tablet (80 mg) by mouth 2 times daily Start 2 days prior to cardioversion on 2-12-24 60 tablet 6    spironolactone-hydrochlorothiazide (ALDACTAZIDE) 25-25 mg tablet Take by mouth daily Patient takes one-half tablet daily.      warfarin (COUMADIN) 2.5 MG tablet [WARFARIN (COUMADIN) 2.5 MG TABLET] Adjust dose based on INR results as directed.      pregabalin (LYRICA) 50 MG capsule Take 50 mg by mouth every evening        Allergies   Allergen Reactions    Pregabalin Other (See Comments)     Foggy thinking    Ace Inhibitors Cough    Empagliflozin Other (See Comments)     weakness    Irbesartan Other (See Comments)     exhaustion    Metformin GI Disturbance     2013.  Retry 2016- gastrointestinal  intolerance again    Pioglitazone Other (See Comments)     Hand leg and facial swelling          Lab Results   Lab Results   Component Value Date     09/06/2023    CO2 21 09/06/2023    CO2 19 09/03/2021    BUN 20.2 09/06/2023    BUN 20 09/03/2021     Lab Results   Component Value Date    WBC 9.2 09/06/2023    HGB 13.1 09/06/2023    HCT 38.9 09/06/2023    MCV 89 09/06/2023     09/06/2023       Lab Results   Component Value Date    INR 2.6 09/15/2023    INR 2.9 06/21/2023

## 2024-02-08 NOTE — TELEPHONE ENCOUNTER
Please see OV from today 2/85/2024:  Repeat ECG today x 2 also confirms sinus rhythm with occasional PACs (1 performed at double standard to amplify P waves).      Today, we spent the lions share of the visit discussing left atrial appendage occlusion device placement.  She is interested in pursuing this (see formal Shared Decision Making documentation below).  Plan:  Given the aforementioned, we will cancel plans for cardioversion.  Will also cancel plans for sotalol initiation.  Continue warfarin.  Forge ahead with workup & plan for left atrial appendage occlusion device placement.    Please cancel DCCV.  Thank you,  Bettina

## 2024-02-08 NOTE — LETTER
"2/8/2024    DESHAUN YBARRA  [address] [facility] [address]    RE: Yvonne Fatima       Dear Colleague,     I had the pleasure of seeing Yvonne Fatima in the [facility].         [facility]   [address], [place] [address]   [email or web address]   OFFICE: [phone]          Thank you Dr. Randall for asking the [facility] Heart Care team to participate in the care of your patient, Yvonne Fatima.     Impression and Plan     1.  Atrial fibrillation (persistent).  Atrial fibrillation has been a chronic issue for her and she.  It was diagnosed prior to 2010.  Documentation by Dr. Timur Corona indicates that she has undergone multiple ablations most recently 9 September 2023 (focal AT, confirmed prior PVI + GREGORIO line + roofline + CTI line).  She has had some issues with instability and falling.  She is interested in pursuing left atrial appendage occlusion device placement and workup has already been initiated.    Earlier this month, and she was noted to have some irregularity in her heart rhythm.  She had ECG performed on 2 February 2024 that was reported as \"atrial fibrillation\".  She was scheduled to have cardioversion on 14 February 2024 with sotalol to start 48  prior.  I personally reviewed ECG and ECG actually reveals sinus rhythm with PACs though admittedly with low P-wave amplitude (formal ECG readout was corrected this morning to reflect sinus rhythm).    Repeat ECG today x 2 also confirms sinus rhythm with occasional PACs (1 performed at double standard to amplify P waves).     Today, we spent the lions share of the visit discussing left atrial appendage occlusion device placement.  She is interested in pursuing this (see formal Shared Decision Making documentation below).  Plan:  Given the aforementioned, we will cancel plans for cardioversion.  Will also cancel plans for sotalol initiation.  Continue " "warfarin.  Forge ahead with workup & plan for left atrial appendage occlusion device placement.    2.  Coronary artery disease.  Ellyn has coronary artery disease by virtue of CT scan 30 July 2023 revealing moderate burden of multivessel coronary artery calcification (see Cardiac Diagnostics section below).    On interview, Ellyn denies any chest pain or other symptoms concerning for angina.    3.  Hypertension.  Blood pressure is fairly reasonable in the office today.    5.  Dyslipidemia.  Lipid profile 8 December 2023 revealed LDL 51 mg/dL and HDL 41 mg/dL.  Continue atorvastatin.    4.  Obstructive sleep apnea.  Patient reports consistent use of CPAP.    35 minutes spent reviewing prior records (including documentation, laboratory studies, cardiac testing/imaging), interview with patient along with physical exam, planning, and subsequent documentation/crafting of note).       Shared Decision Making     \"Miller" has documented nonvalvular atrial fibrillation (NVAF) and is currently on oral anticoagulant therapy warfarin.  SANJAY?DS?-VASc = 8 (age of 83, ?, HTN, TIA, CAD, and diabetes mellitus type 2).  HAS-BLED risk score: 3 (age of 83, bleeding predisposition, and TIA). Indications to consider non-oral anticoagulation therapy: Bleeding predisposition and propensity for falling.  \"Miller" has no contra-indication to come off oral anti-coagulant therapy if LAAC device is successfully placed.      I have personally reviewed \"Miller"'s chart and discussed the following with \"Miller"/family; 1) The choices available for reducing stroke risk from atrial fibrillation, 2) Treatment options available including respective risk/benefits, and 3) What factors are most important for the patient in making their decision.  The ACC shared decision making https://www.cardiosmart.org/SDM/Decision-Aids/Find-Decision-Aids/Atrial-Fibrillation was used to guide this conversation.  \"Miller" was counseled that their decision could be made at " "this time or in the future if more time was needed to consider their decision.      \"Ellyn\" is felt to be an appropriate candidate to proceed with left atrial appendage screening and implant.         History of Present Illness    Once again I would like to thank you again for asking me to participate in the care of your patient, Yvonne Fatima.  As you know, but to reiterate for my own records, Yvonne Fatima is a 83 year old female historically followed in the [facility].    She has a history of persistent atrial fibrillation. Atrial fibrillation has been a chronic issue for her.It was diagnosed prior to 2010.  Documentation by Dr. Timur Corona indicates that she has undergone multiple ablations most recently 9 September 2023  (focal AT, confirmed prior PVI + GREGORIO line + roofline + CTI line).  She has had some issues with instability and falling.    Earlier this month, and she was noted to have some irregularity in her heart rhythm.  She had ECG performed 2 February 2024 that was reported as \"atrial fibrillation\".  She was to have cardioversion 14 February 2024.    On interview, she reported that she had some \"GI issues\" earlier in the month though these have now resolved.  Presently states she is at her baseline and feeling comfortable.  She minimizes any subjective palpitations.  Breathing has been fairly comfortable.  No chest pain symptoms.    Further review of systems is otherwise negative/noncontributory (medical record and 13 point review of systems reviewed as well and pertinent positives noted).         Cardiac Diagnostics      Echocardiogram 26 June 2023:  Normal left ventricular size and systolic performance with ejection fraction of 60-65%.  Mild mitral stenosis.  Moderate tricuspid insufficiency.  Mild right ventricular enlargement with normal right ventricular systolic performance.  Moderate biatrial enlargement.  Right ventricular systolic pressure relative to right atrial pressure is mildly " "increased.  The pulmonary artery pressure is estimated to be 40-45 mmHg plus right atrial pressure.    Holter monitor 24 January 2024:  Holter monitoring (duration 24hrs).  Continuous atrial fibrillation, 64 to 122bpm, average 82bpm.  There were no pauses of greater than 3 seconds.  Rare premature ventricular contractions (2%).  Symptom triggers - none.     CT scan 30 July 2023:  Pulmonary vein measurements as detailed below. There are two right pulmonary veins and two left pulmonary veins. There is early branching of the right superior and right inferior pulmonary veins. There is circumferential calcification and mild stenosis of the ostium of the left inferior pulmonary vein.  No thrombus in the left atrium or left atrial appendage.  Normal left atrial size. Moderate right atrial enlargement.  Mild right ventricular enlargement.  Moderate burden of multivessel coronary artery calcification. Study was not tailored to evaluate for coronary artery stenosis.    Twelve-lead ECGs x 2 (personally reviewed) 8 February 2024: Sinus rhythm with PACs.    Twelve-lead ECG (personally reviewed) 2 February 2024: Sinus rhythm with PACs.           Physical Examination       /69 (BP Location: Left arm, Patient Position: Sitting, Cuff Size: Adult Regular)   Pulse 61   Temp 98.2  F (36.8  C) (Oral)   Resp 16   Ht 1.549 m (5' 1\")   Wt 65.6 kg (144 lb 11.2 oz)   SpO2 97%   BMI 27.34 kg/m          Wt Readings from Last 3 Encounters:   02/08/24 65.6 kg (144 lb 11.2 oz)   12/28/23 65.3 kg (144 lb)   11/24/23 64.4 kg (142 lb)     The patient is alert and oriented times three. Sclerae are anicteric. Mucosal membranes are moist. Jugular venous pressure is normal. No significant adenopathy/thyromegally appreciated. Lungs are clear with good expansion. On cardiovascular exam, the patient has slightly irregular 1 and S2. Abdomen is soft and non-tender. Extremities reveal no clubbing, cyanosis, or edema.         Family History/Social " History/Risk Factors   Patient does not smoke.  Family history reviewed, and family history includes Aneurysm in her mother; Breast Cancer in her sister; Emphysema in her father; Hypertension in her brother; Lung Cancer in her sister, sister, and sister.          Medical History  Surgical History Family History Social History   Past Medical History:   Diagnosis Date    Atrial fibrillation (H)     [id number] score of 7-on chronic warfarin Previously failed Sotalol and Multaq therapy PVI 3/11 Repeat PVI 7/11  Recurrent A fib, most recent CV Feb 2013 Rx sotalol     Atrial flutter (H)     Atypical flutter with RVR after second PVI in July 2011      Esophageal reflux     Created by Conversion     Essential hypertension          Status post catheter ablation of atrial fibrillation 6/3/2019    PVI 3/11(PVI/Cryo + CTI line) Repeat PVI 7/11 (PVI/RF + CFE + Roof line + GREGORIO line)     Past Surgical History:   Procedure Laterality Date    EP ABLATION PULMONARY VEIN ISOLATION N/A 9/6/2023    Procedure: Ablation Atrial Fibrillation;  Surgeon: Timur Corona MD;  [facility]: [facility] CV    HC REMOVE TONSILS/ADENOIDS,<13 Y/O      Description: Tonsillectomy With Adenoidectomy;  Recorded: 10/17/2012;    HC REVISE MEDIAN N/CARPAL TUNNEL SURG      Description: Neuroplasty Decompression Median Nerve At Carpal Tunnel;  Recorded: 10/17/2012;    VT ABLATE HEART DYSRHYTHM FOCUS      Description: Catheter Ablation Atrial Fibrillation;  Recorded: 06/20/2013;  Comments: PVI 3-7-2011 (PVI/cryo + CTI line); ; Repeat PVI July 2011 (PVI/RF + CFE + Roof line + GREGORIO line)    VT ABLATE HEART DYSRHYTHM FOCUS      Description: Catheter Ablation Atrial Fibrillation;  Recorded: 08/05/2014;  Comments: PVI 3-7-2011 (PVI/cryo + CTI line); ; Repeat PVI July 2011 (PVI/RF + CFE + Roof line + GREGORIO line)    VT CARDIOVERSION ELECTIVE ARRHYTHMIA EXTERNAL       Fib 10/17/12; ; Flutter 2/15/13; AFl 11/11/2019     Family History   Problem Relation Age of Onset     Aneurysm Mother         brain    Emphysema Father     Lung Cancer Sister     Hypertension Brother     Lung Cancer Sister     Lung Cancer Sister     Breast Cancer Sister         Social History     Socioeconomic History    Marital status:      Spouse name: Not on file    Number of children: 3    Years of education: Not on file    Highest education level: Not on file   Occupational History    Not on file   Tobacco Use    Smoking status: Former     Types: Cigarettes     Quit date: 1962     Years since quittin.8    Smokeless tobacco: Never   Vaping Use    Vaping Use: Never used   Substance and Sexual Activity    Alcohol use: Yes     Comment: Alcoholic Drinks/day: 6 per month    Drug use: No    Sexual activity: Yes     Partners: Male   Other Topics Concern    Not on file   Social History Narrative    Not on file     Social Determinants of Health     Financial Resource Strain: Not on file   Food Insecurity: Not on file   Transportation Needs: Not on file   Physical Activity: Not on file   Stress: Not on file   Social Connections: Not on file   Interpersonal Safety: Not on file   Housing Stability: Not on file           Medications  Allergies   Current Outpatient Medications   Medication Sig Dispense Refill    allopurinol (ZYLOPRIM) 100 MG tablet [ALLOPURINOL (ZYLOPRIM) 100 MG TABLET] Take 100 mg by mouth 2 (two) times a day.             atorvastatin (LIPITOR) 40 MG tablet Take 1 tablet by mouth daily      BD SUELLEN U/F 32G X 4 MM insulin pen needle       DULoxetine (CYMBALTA) 30 MG capsule Take 30 mg by mouth daily      gabapentin (NEURONTIN) 100 MG capsule Take 100 mg by mouth 2 times daily      glipiZIDE (GLUCOTROL XL) 10 MG 24 hr tablet Take 2 tablets by mouth daily      insulin degludec (TRESIBA FLEXTOUCH) 100 UNIT/ML pen Inject 20 Units Subcutaneous      Lancets (ONETOUCH DELICA PLUS STITEN00N) MISC Test once daily varying time,      ONETOUCH VERIO IQ test strip Test 3 times a day.      pantoprazole  "(PROTONIX) 40 MG EC tablet Take 40 mg by mouth daily      potassium chloride (KLOR-CON) 10 MEQ CR tablet [POTASSIUM CHLORIDE (KLOR-CON) 10 MEQ CR TABLET] Take 20 mEq by mouth 2 (two) times a day.      semaglutide (OZEMPIC, 0.25 OR 0.5 MG/DOSE,) 2 MG/1.5ML SOPN pen Inject 0.25 mg Subcutaneous      SOLIQUA pen Inject 50 Units Subcutaneous every morning (before breakfast)      sotalol (BETAPACE) 80 MG tablet Take 1 tablet (80 mg) by mouth 2 times daily Start 2 days prior to cardioversion on 2-12-24 60 tablet 6    spironolactone-hydrochlorothiazide (ALDACTAZIDE) 25-25 mg tablet Take by mouth daily Patient takes one-half tablet daily.      warfarin (COUMADIN) 2.5 MG tablet [WARFARIN (COUMADIN) 2.5 MG TABLET] Adjust dose based on INR results as directed.      pregabalin (LYRICA) 50 MG capsule Take 50 mg by mouth every evening         Allergies   Allergen Reactions    Pregabalin Other (See Comments)     Foggy thinking    Ace Inhibitors Cough    Empagliflozin Other (See Comments)     weakness    Irbesartan Other (See Comments)     exhaustion    Metformin GI Disturbance     2013.  Retry 2016- gastrointestinal intolerance again    Pioglitazone Other (See Comments)     Hand leg and facial swelling          Lab Results    Chemistry/lipid CBC Cardiac Enzymes/BNP/TSH/INR   No results for input(s): \"CHOL\", \"HDL\", \"LDL\", \"TRIG\", \"CHOLHDLRATIO\" in the last 23907 hours.  No results for input(s): \"LDL\" in the last 37479 hours.  Recent Labs   Lab Test 09/06/23  1028   *   POTASSIUM 4.0   CHLORIDE 99   CO2 21*   *   BUN 20.2   CR 0.89   GFRESTIMATED 64   HOLLIS 9.4     Recent Labs   Lab Test 09/06/23  1028 08/31/23  0751 07/30/23  1000   CR 0.89 0.84 0.9     No results for input(s): \"A1C\" in the last 96000 hours.       Recent Labs   Lab Test 09/06/23  1028   WBC 9.2   HGB 13.1   HCT 38.9   MCV 89        Recent Labs   Lab Test 09/06/23  1028 08/31/23  0751   HGB 13.1 13.4    No results for input(s): \"TROPONINI\" in the " "last 58722 hours.  No results for input(s): \"BNP\", \"NTBNPI\", \"NTBNP\" in the last 92901 hours.  No results for input(s): \"TSH\" in the last 06310 hours.  Recent Labs   Lab Test 09/15/23  0000 09/06/23  1028 08/23/23  1011   INR 2.6* 2.57* 3.2*          Medications  Allergies   Current Outpatient Medications   Medication Sig Dispense Refill    allopurinol (ZYLOPRIM) 100 MG tablet [ALLOPURINOL (ZYLOPRIM) 100 MG TABLET] Take 100 mg by mouth 2 (two) times a day.             atorvastatin (LIPITOR) 40 MG tablet Take 1 tablet by mouth daily      BD SUELLEN U/F 32G X 4 MM insulin pen needle       DULoxetine (CYMBALTA) 30 MG capsule Take 30 mg by mouth daily      gabapentin (NEURONTIN) 100 MG capsule Take 100 mg by mouth 2 times daily      glipiZIDE (GLUCOTROL XL) 10 MG 24 hr tablet Take 2 tablets by mouth daily      insulin degludec (TRESIBA FLEXTOUCH) 100 UNIT/ML pen Inject 20 Units Subcutaneous      Lancets (ONETOUCH DELICA PLUS [id number]) MISC Test once daily varying time,      ONETOUCH VERIO IQ test strip Test 3 times a day.      pantoprazole (PROTONIX) 40 MG EC tablet Take 40 mg by mouth daily      potassium chloride (KLOR-CON) 10 MEQ CR tablet [POTASSIUM CHLORIDE (KLOR-CON) 10 MEQ CR TABLET] Take 20 mEq by mouth 2 (two) times a day.      semaglutide (OZEMPIC, 0.25 OR 0.5 MG/DOSE,) 2 MG/1.5ML SOPN pen Inject 0.25 mg Subcutaneous      SOLIQUA pen Inject 50 Units Subcutaneous every morning (before breakfast)      sotalol (BETAPACE) 80 MG tablet Take 1 tablet (80 mg) by mouth 2 times daily Start 2 days prior to cardioversion on 2-12-24 60 tablet 6    spironolactone-hydrochlorothiazide (ALDACTAZIDE) 25-25 mg tablet Take by mouth daily Patient takes one-half tablet daily.      warfarin (COUMADIN) 2.5 MG tablet [WARFARIN (COUMADIN) 2.5 MG TABLET] Adjust dose based on INR results as directed.      pregabalin (LYRICA) 50 MG capsule Take 50 mg by mouth every evening        Allergies   Allergen Reactions    Pregabalin Other (See " Comments)     Foggy thinking    Ace Inhibitors Cough    Empagliflozin Other (See Comments)     weakness    Irbesartan Other (See Comments)     exhaustion    Metformin GI Disturbance     2013.  Retry 2016- gastrointestinal intolerance again    Pioglitazone Other (See Comments)     Hand leg and facial swelling          Lab Results   Lab Results   Component Value Date     09/06/2023    CO2 21 09/06/2023    CO2 19 09/03/2021    BUN 20.2 09/06/2023    BUN 20 09/03/2021     Lab Results   Component Value Date    WBC 9.2 09/06/2023    HGB 13.1 09/06/2023    HCT 38.9 09/06/2023    MCV 89 09/06/2023     09/06/2023       Lab Results   Component Value Date    INR 2.6 09/15/2023    INR 2.9 06/21/2023                      Thank you for allowing me to participate in the care of your patient.      Sincerely,     Isaías Jamil MD     [facility]  cc:   Millie Randall PA-C  [facility]  [address], [address]  [place] [address]

## 2024-02-12 ENCOUNTER — TELEPHONE (OUTPATIENT)
Dept: CARDIOLOGY | Facility: CLINIC | Age: 84
End: 2024-02-12
Payer: COMMERCIAL

## 2024-02-12 NOTE — TELEPHONE ENCOUNTER
Incoming call from INR clinic with [facility]. Spoke to AGUS Garay regarding Warfarin and INR directions for her procedure on 3/13/24. He would like a call following the procedure to know if device was placed. Gave number to call to speak directly with a nurse [phone]. -SC

## 2024-02-16 ENCOUNTER — TELEPHONE (OUTPATIENT)
Dept: CARDIOLOGY | Facility: CLINIC | Age: 84
End: 2024-02-16
Payer: COMMERCIAL

## 2024-02-16 NOTE — TELEPHONE ENCOUNTER
Phone call to patient to see if interest in having LAAC at a sooner date, 2/21/2024. Had office visit with Dr. Jamil 2/8/24 so only needs RN visit with labs. Phone call to patient, she states she had a bad UTI and had to take 3 abx and now she has a cold and her  is in the hospital with pneumonia. Encouraged patient to return call to writer in about two weeks to let us know if she needs to reschedule her LAAC procedure. She verbalized understanding, no further questions at this time. [facility]

## 2024-02-28 ENCOUNTER — TELEPHONE (OUTPATIENT)
Dept: CARDIOLOGY | Facility: CLINIC | Age: 84
End: 2024-02-28
Payer: COMMERCIAL

## 2024-02-28 DIAGNOSIS — I48.19 PERSISTENT ATRIAL FIBRILLATION (H): Primary | ICD-10-CM

## 2024-02-28 RX ORDER — ASPIRIN 81 MG/1
81 TABLET ORAL DAILY
COMMUNITY
Start: 2024-03-27

## 2024-02-28 NOTE — TELEPHONE ENCOUNTER
Patient scheduled for LAAC two weeks from today. Given patient has no prior imaging of JENNIFER, Dr. Corona would like patient to start once daily 81 mg ASA two weeks prior to procedure, which would be today. She is also on Tresiba, Semaglutide, and Soliqua.     For Tresiba, 1/2 dose PM prior to procedure.  For Semaglutide, hold 7 full days prior to procedure (Last dose 3/5).  For Soliqua, writer is seeking clarification.    Phone call to patient, no abx currently. She is wondering if she should delay LAAC due to recent illness and  recovery from PNA. Offered alternate procedure date if that would make her more comfortable. She would like to have LAAC 4/10/2024. Instructed her to start once daily 81 mg ASA 2 weeks prior to procedure. She has written this information down. We also discussed no semaglutide 7 days prior to procedure, to which she states she is not currently taking. Will have scheduling contact her to arrange new procedure date. Otherwise, she has writer's number to call with other questions. Thanked her for her time. [facility]    ----- Message -----  From: Ruben Alcantar RPH  Sent: 2/28/2024  12:59 PM CST  To: AGUS Shipley--great question --any drug with glp- in it like soliqua will require at least 7 days hold prior to surgery --higher risk patient is for aspiration of food in anesthesia --then maybe 10-14 days would be advised.    Thx.    Ruben Alcantar Rph.  Medication Therapy Management Provider  [phone]    ----- Message -----  From: Vandana Mohr RN  Sent: 2/28/2024  12:30 PM CST  To: Kya Vela RN; [facility] Pharmacist Consult    [facility]!    I had a question regarding recommended medication hold for this patient's once daily Soliqua. She will have procedure with GA in one month or so. What is recommended hold for this medication, since it contains both GLP-1 and insulin glargine? Thanks so much!    Vandana CORRIGAN RN  Structural Heart Nurse Coordinator  Office Hours: M-F  8am-4:30pm  Direct Line: [phone]

## 2024-02-29 NOTE — TELEPHONE ENCOUNTER
Patient will need to be contacted seven days prior to LAAC to hold Soliqua, per pharm instructions below. [facility]    ----- Message -----  From: Laurie Cleveland  Sent: 2/28/2024   5:06 PM CST  To: Vandana Mohr RN    done  ----- Message -----  From: Vandana Mohr RN  Sent: 2/28/2024  12:08 PM CST  To: Laurie Cleveland    ----- Message from Vandana Mohr RN sent at 2/28/2024 12:08 PM CST -----  Patient wants to move procedure to 4/10 now. Can you contact and arrange? Kya and I will work on finding 4th case for 3/13. Thanks!

## 2024-03-03 ENCOUNTER — HEALTH MAINTENANCE LETTER (OUTPATIENT)
Age: 84
End: 2024-03-03

## 2024-03-08 ENCOUNTER — TELEPHONE (OUTPATIENT)
Dept: CARDIOLOGY | Facility: CLINIC | Age: 84
End: 2024-03-08
Payer: COMMERCIAL

## 2024-03-08 NOTE — TELEPHONE ENCOUNTER
[facility] Call Center    Phone Message    May a detailed message be left on voicemail: yes     Reason for Call: Other: pts anticoagulation clinic is trying to reach team for the watchman procedure to re go over the warfarin dosages for procedure. Pt was wondering if she needed more frequent blood draws as well. Please call the [place] anticoagulation clinic back to discuss.      Action Taken: Other: cardiology     Travel Screening: Not Applicable                                                             Thank you!  [facility]

## 2024-03-08 NOTE — TELEPHONE ENCOUNTER
Outgoing call to INR clinic. Explained her procedure is scheduled for 4/10 and discussed medication protocol. Will call clinic after procedure to let them know the plan after procedure is completed. -SC

## 2024-03-25 ENCOUNTER — TELEPHONE (OUTPATIENT)
Dept: CARDIOLOGY | Facility: CLINIC | Age: 84
End: 2024-03-25
Payer: COMMERCIAL

## 2024-03-25 NOTE — TELEPHONE ENCOUNTER
Patient scheduled for LAAC 4/10/2024. Patient will have INR done day of pre-op examination and day of LAAC procedure. Per post-LAAC medication guidelines, patient to remain on daily warfarin and antiplatelet medication for approximately 6 weeks following their procedure. Patient will require weekly INRs for the first 4 weeks following their procedure. Structural team will only be monitoring INR peripherally and making no dose changes to patient warfarin. Phone call directly to patient INR clinic to update them on new post-LAAC INR guidelines.  Patient was also instructed to hold Soliqua seven full days prior to procedure. They have written all information down and have been provided writer's direct number to call with further questions or concerns. No further questions at this time. [facility]

## 2024-03-25 NOTE — TELEPHONE ENCOUNTER
Phone call to patient. Discussed the following medication instructions. No Soliqua after 4/2/24. INRs done at [facility]. She will continue daily warfarin and continue for 6 weeks post-LAAC. She will start once daily 81 mg ASA this week on 3/27/2024 in preparation for LAAC procedure this week. Will reach out to INR clinic for FYI on warfarin plan post-procedure. A[facility]LINE

## 2024-03-25 NOTE — TELEPHONE ENCOUNTER
[facility] Call Center    Phone Message    May a detailed message be left on voicemail: yes     Reason for Call: Other: Patient would like a call back regarding medications to stop and start taking, angiogram there providers name and  when to have INR's done? Please reach out to patient to discuss. Thank you       Action Taken: Other: cardiology     Travel Screening: Not Applicable             Thank you!  [facility]

## 2024-03-26 DIAGNOSIS — I48.19 PERSISTENT ATRIAL FIBRILLATION (H): Primary | ICD-10-CM

## 2024-03-28 ENCOUNTER — TELEPHONE (OUTPATIENT)
Dept: CARDIOLOGY | Facility: CLINIC | Age: 84
End: 2024-03-28
Payer: COMMERCIAL

## 2024-03-28 NOTE — TELEPHONE ENCOUNTER
[facility] Call Center    Phone Message    May a detailed message be left on voicemail: yes     Reason for Call: Other: Patient would like a call back to go over questions in regards to upcoming procedure.     Action Taken: Other: Cardiology    Travel Screening: Not Applicable    Thank you!  [facility]

## 2024-03-28 NOTE — TELEPHONE ENCOUNTER
Phone call to patient, discussed medication questions. Instructed her to continue Glipizide, but she is to stop Soliqua for 7 full days prior to her procedure, which she reported she has. No further questions at this time. ADELINE

## 2024-04-04 ENCOUNTER — TELEPHONE (OUTPATIENT)
Dept: CARDIOLOGY | Facility: CLINIC | Age: 84
End: 2024-04-04
Payer: COMMERCIAL

## 2024-04-04 NOTE — TELEPHONE ENCOUNTER
[facility] Call Center    Phone Message    May a detailed message be left on voicemail: yes     Reason for Call: Other: patient is calling regarding a medication change she would like the care team to be aware about before the procedure, please call patient as she also has a question regarding the appt on 4/8/2024, thank you.      Action Taken: Other: cardiology    Travel Screening: Not Applicable  Thank you!  [facility]

## 2024-04-04 NOTE — TELEPHONE ENCOUNTER
Outgoing call to patient. She stated she was started on Lantus 52 units in the AM- Will update med list to reflect this and instructed patient we will give instructions for her when she comes in for appointment with us for the procedure. She had no further questions. -SC

## 2024-04-07 LAB
ABO/RH(D): NORMAL
ANTIBODY SCREEN: NEGATIVE
SPECIMEN EXPIRATION DATE: NORMAL

## 2024-04-08 ENCOUNTER — OFFICE VISIT (OUTPATIENT)
Dept: CARDIOLOGY | Facility: CLINIC | Age: 84
End: 2024-04-08
Attending: INTERNAL MEDICINE
Payer: COMMERCIAL

## 2024-04-08 ENCOUNTER — DOCUMENTATION ONLY (OUTPATIENT)
Dept: CARDIOLOGY | Facility: CLINIC | Age: 84
End: 2024-04-08

## 2024-04-08 ENCOUNTER — PREP FOR PROCEDURE (OUTPATIENT)
Dept: CARDIOLOGY | Facility: CLINIC | Age: 84
End: 2024-04-08

## 2024-04-08 VITALS
WEIGHT: 145 LBS | HEART RATE: 80 BPM | RESPIRATION RATE: 16 BRPM | DIASTOLIC BLOOD PRESSURE: 70 MMHG | SYSTOLIC BLOOD PRESSURE: 138 MMHG | HEIGHT: 61 IN | BODY MASS INDEX: 27.38 KG/M2

## 2024-04-08 DIAGNOSIS — I48.4 ATYPICAL ATRIAL FLUTTER (H): ICD-10-CM

## 2024-04-08 DIAGNOSIS — I48.19 PERSISTENT ATRIAL FIBRILLATION (H): ICD-10-CM

## 2024-04-08 DIAGNOSIS — I48.19 PERSISTENT ATRIAL FIBRILLATION (H): Primary | ICD-10-CM

## 2024-04-08 DIAGNOSIS — Z98.890 STATUS POST CATHETER ABLATION OF ATRIAL FIBRILLATION: ICD-10-CM

## 2024-04-08 DIAGNOSIS — I47.19 ATRIAL TACHYCARDIA (H): ICD-10-CM

## 2024-04-08 LAB
ANION GAP SERPL CALCULATED.3IONS-SCNC: 14 MMOL/L (ref 7–15)
BUN SERPL-MCNC: 23.2 MG/DL (ref 8–23)
CALCIUM SERPL-MCNC: 9.8 MG/DL (ref 8.8–10.2)
CHLORIDE SERPL-SCNC: 101 MMOL/L (ref 98–107)
CREAT SERPL-MCNC: 1 MG/DL (ref 0.51–0.95)
DEPRECATED HCO3 PLAS-SCNC: 22 MMOL/L (ref 22–29)
EGFRCR SERPLBLD CKD-EPI 2021: 55 ML/MIN/1.73M2
ERYTHROCYTE [DISTWIDTH] IN BLOOD BY AUTOMATED COUNT: 13.8 % (ref 10–15)
GLUCOSE SERPL-MCNC: 332 MG/DL (ref 70–99)
HCT VFR BLD AUTO: 38.9 % (ref 35–47)
HGB BLD-MCNC: 12.6 G/DL (ref 11.7–15.7)
INR PPP: 2.87 (ref 0.85–1.15)
MCH RBC QN AUTO: 28.8 PG (ref 26.5–33)
MCHC RBC AUTO-ENTMCNC: 32.4 G/DL (ref 31.5–36.5)
MCV RBC AUTO: 89 FL (ref 78–100)
PLATELET # BLD AUTO: 256 10E3/UL (ref 150–450)
POTASSIUM SERPL-SCNC: 4.3 MMOL/L (ref 3.4–5.3)
RBC # BLD AUTO: 4.38 10E6/UL (ref 3.8–5.2)
SODIUM SERPL-SCNC: 137 MMOL/L (ref 135–145)
WBC # BLD AUTO: 8.6 10E3/UL (ref 4–11)

## 2024-04-08 PROCEDURE — 80048 BASIC METABOLIC PNL TOTAL CA: CPT

## 2024-04-08 PROCEDURE — 85610 PROTHROMBIN TIME: CPT

## 2024-04-08 PROCEDURE — 86900 BLOOD TYPING SEROLOGIC ABO: CPT

## 2024-04-08 PROCEDURE — 93000 ELECTROCARDIOGRAM COMPLETE: CPT | Performed by: INTERNAL MEDICINE

## 2024-04-08 PROCEDURE — 99214 OFFICE O/P EST MOD 30 MIN: CPT | Performed by: PHYSICIAN ASSISTANT

## 2024-04-08 PROCEDURE — 99207 PR NO CHARGE NURSE ONLY: CPT

## 2024-04-08 PROCEDURE — 36415 COLL VENOUS BLD VENIPUNCTURE: CPT

## 2024-04-08 PROCEDURE — 86850 RBC ANTIBODY SCREEN: CPT

## 2024-04-08 PROCEDURE — 85027 COMPLETE CBC AUTOMATED: CPT

## 2024-04-08 PROCEDURE — 86901 BLOOD TYPING SEROLOGIC RH(D): CPT

## 2024-04-08 RX ORDER — ASPIRIN 81 MG/1
81 TABLET ORAL ONCE
Status: CANCELLED | OUTPATIENT
Start: 2024-04-08 | End: 2024-04-08

## 2024-04-08 RX ORDER — CEFAZOLIN SODIUM/WATER 2 G/20 ML
2 SYRINGE (ML) INTRAVENOUS
Status: CANCELLED | OUTPATIENT
Start: 2024-04-10

## 2024-04-08 RX ORDER — NICOTINE POLACRILEX 4 MG
15-30 LOZENGE BUCCAL
Status: CANCELLED | OUTPATIENT
Start: 2024-04-10

## 2024-04-08 RX ORDER — LIDOCAINE 40 MG/G
CREAM TOPICAL
Status: CANCELLED | OUTPATIENT
Start: 2024-04-08

## 2024-04-08 RX ORDER — DEXTROSE MONOHYDRATE 25 G/50ML
25-50 INJECTION, SOLUTION INTRAVENOUS
Status: CANCELLED | OUTPATIENT
Start: 2024-04-10

## 2024-04-08 NOTE — LETTER
4/8/2024    DESHAUN YBARRA  [address] [facility] [address]    RE: Yvonne Fatima       Dear Colleague,     I had the [facility].  HEART CARE ENCOUNTER NOTE       [facility]  [phone]      Assessment/Recommendations   1.  Persistent atrial fibrillation/atrial flutter: I have personally reviewed this patient's chart and have spoken with the patient about the treatment options, including JENNIFER device.  She has a [id number] score of 7 for age >75, female gender, hypertension, diabetes, history of TIA.  She has a HAS-BLED score of 3 for age, bleeding predisposition, history of TIA.   She is not a good candidate for long-term anticoagulation due to balance issues which increases her risk for falls.    She is taking aspirin 81 mg daily in addition to warfarin.  After implant, she will continue on aspirin 81 mg daily and warfarin for 45 days postprocedure.  She will then stop warfarin and start DAPT with aspirin 81 mg daily and Plavix 75 mg daily until 6 months postprocedure.  She will then stop Plavix and remain on aspirin 81 mg daily indefinitely.  She will have a CT scan approximately 3 months and 1 year post-implant.  She understands that the risks of the procedure are <2% and include, but are not limited to device embolization, air embolism, myocardial perforation, device thrombosis, ASD, stroke, or death.  We discussed expected recovery and follow-up.       The patient is a good candidate for proceeding with left atrial appendage implant.  Her questions were answered to her satisfaction.  Written consents have been signed and witnessed by me.  Her labs will be reviewed when resulted.  Her EKG has been reviewed.     2.  Hypertension -blood pressure is controlled.  Continue spironolactone-hydrochlorothiazide    3.  Chronic kidney disease stage III -creatinine is stable on today's labs    4.  Obstructive sleep apnea  -wears CPAP    5.  Dyslipidemia -last LDL 51 (December 2023).  Continue Lipitor 40 mg daily    6.  Type 2 diabetes mellitus -last A1c 9 (December 2023).  Blood glucose is elevated on today's labs.  She should follow-up with her primary care provider regarding her diabetic regimen    7.  History of TIA -approximately 10 to 15 years ago after her warfarin was stopped.  No residual deficits       History of Present Illness/Subjective    Yvonne Fatima is a 84 year old female who comes in today for history and physical prior to insertion of left atrial appendage occulsion device.    Yvonne Fatima has a past history of persistent atrial fibrillation, atrial flutter, hypertension, dyslipidemia, chronic kidney disease stage III, type 2 diabetes mellitus, obstructive sleep apnea, history of TIA    She has a history of TIA 10 to 15 years ago when her warfarin was stopped while [place] of state.  She has no residual symptoms.  She denies a previous history of DVT or PE.  She denies easy bruising.  She denies any major bleeding issues.  She does report a history of hemorrhoids and occasionally reports blood in her stool.  She does report some balance issues as she has aged.  She denies any recent falls.  She denies NSAID use.  She consumes approximately 1 alcoholic drink weekly.  She denies painful or difficulty swallowing.  She denies a family history of bleeding or nosebleeds.    Yvonne Fatima denies chest discomfort, palpitations, shortness of breath, paroxysmal nocturnal dyspnea, orthopnea, lightheadedness, dizziness, pre-syncope, or syncope.  Yvonne Fatima also denies any weight loss, changes in appetite, nausea or vomiting.     Medical, surgical, family, social history, and medications were reviewed and updated as necessary.    ECHO results (from 8/7/2023):  Interpretation Summary     The left ventricle is normal in size.  Left ventricular function is normal.The ejection fraction is 60-65%.  Left  "ventricular diastolic function is abnormal.  The right ventricle is mildly dilated.  The left atrium is moderately dilated. The right atrium is moderately dilated.  There is severe mitral annular calcification.  There is mild mitral stenosis.  There is moderate (2+) tricuspid regurgitation.  Right ventricular systolic pressure is elevated, consistent with mild  pulmonary hypertension.    EKG (personally reviewed and interpreted):  Atrial fibrillation, ventricular rate 80     Physical Examination Review of Systems   Vitals: /70 (BP Location: Left arm, Patient Position: Sitting, Cuff Size: Adult Regular)   Pulse 80   Ht 1.549 m (5' 1\")   Wt 65.8 kg (145 lb)   BMI 27.40 kg/m    BMI= Body mass index is 27.4 kg/m .  Wt Readings from Last 3 Encounters:   04/08/24 65.8 kg (145 lb)   02/08/24 65.6 kg (144 lb 11.2 oz)   12/28/23 65.3 kg (144 lb)       General Appearance:   Alert, cooperative and in no acute distress   ENT/Mouth: membranes moist, no oral lesions or bleeding gums.      EYES:  no scleral icterus, normal conjunctivae   Neck: Thyroid not visualized   Chest/Lungs:   lungs are clear to auscultation, no rales or wheezing   Cardiovascular:   irregular. Normal first and second heart sounds with no murmurs, rubs or gallops; the carotid, radial and posterior tibial pulses are intact, no edema bilaterally    Abdomen:  Soft and nontender. Bowel sounds are present in all quadrants   Extremities: no cyanosis or clubbing   Skin: no xanthelasma, warm.    Neurologic: normal gait, normal  bilateral, no tremors   Psychiatric: Normal mood and affect       Please refer above for cardiac ROS details.      Medical History  Surgical History Family History Social History   Past Medical History:   Diagnosis Date    Atrial fibrillation (H)     [id number] score of 7-on chronic warfarin Previously failed Sotalol and Multaq therapy PVI 3/11 Repeat PVI 7/11  Recurrent A fib, most recent CV Feb 2013 Rx sotalol     Atrial " flutter (H)     Atypical flutter with RVR after second PVI in 2011      Esophageal reflux     Created by Conversion     Essential hypertension          Status post catheter ablation of atrial fibrillation 6/3/2019    PVI 3/11(PVI/Cryo + CTI line) Repeat PVI  (PVI/RF + CFE + Roof line + GREGORIO line)     Past Surgical History:   Procedure Laterality Date    EP ABLATION PULMONARY VEIN ISOLATION N/A 2023    Procedure: Ablation Atrial Fibrillation;  Surgeon: Timur Corona MD;  Location: [facility] LAB CV    HC REMOVE TONSILS/ADENOIDS,<13 Y/O      Description: Tonsillectomy With Adenoidectomy;  Recorded: 10/17/2012;    HC REVISE MEDIAN N/CARPAL TUNNEL SURG      Description: Neuroplasty Decompression Median Nerve At Carpal Tunnel;  Recorded: 10/17/2012;    CO ABLATE HEART DYSRHYTHM FOCUS      Description: Catheter Ablation Atrial Fibrillation;  Recorded: 2013;  Comments: PVI 3-7-2011 (PVI/cryo + CTI line); ; Repeat PVI 2011 (PVI/RF + CFE + Roof line + GREGORIO line)    CO ABLATE HEART DYSRHYTHM FOCUS      Description: Catheter Ablation Atrial Fibrillation;  Recorded: 2014;  Comments: PVI 3-7-2011 (PVI/cryo + CTI line); ; Repeat PVI 2011 (PVI/RF + CFE + Roof line + GREGORIO line)    CO CARDIOVERSION ELECTIVE ARRHYTHMIA EXTERNAL       Fib 10/17/12; ; Flutter 2/15/13; AFl 2019     Family History   Problem Relation Age of Onset    Aneurysm Mother         brain    Emphysema Father     Lung Cancer Sister     Hypertension Brother     Lung Cancer Sister     Lung Cancer Sister     Breast Cancer Sister     Social History     Socioeconomic History    Marital status:      Spouse name: Not on file    Number of children: 3    Years of education: Not on file    Highest education level: Not on file   Occupational History    Not on file   Tobacco Use    Smoking status: Former     Types: Cigarettes     Quit date: 1962     Years since quittin.0    Smokeless tobacco: Never   Vaping Use     Vaping Use: Never used   Substance and Sexual Activity    Alcohol use: Yes     Comment: Alcoholic Drinks/day: 6 per month    Drug use: No    Sexual activity: Yes     Partners: Male   Other Topics Concern    Not on file   Social History Narrative    Not on file     Social Determinants of Health     Financial Resource Strain: Not on file   Food Insecurity: Not on file   Transportation Needs: Not on file   Physical Activity: Not on file   Stress: Not on file   Social Connections: Not on file   Interpersonal Safety: Not on file   Housing Stability: Not on file          Medications  Allergies   Current Outpatient Medications   Medication Sig Dispense Refill    allopurinol (ZYLOPRIM) 100 MG tablet [ALLOPURINOL (ZYLOPRIM) 100 MG TABLET] Take 100 mg by mouth 2 (two) times a day.             aspirin 81 MG EC tablet Take 1 tablet (81 mg) by mouth daily      atorvastatin (LIPITOR) 40 MG tablet Take 1 tablet by mouth daily      BD SUELLEN U/F 32G X 4 MM insulin pen needle       glipiZIDE (GLUCOTROL XL) 10 MG 24 hr tablet Take 2 tablets by mouth daily      insulin glargine (LANTUS PEN) 100 UNIT/ML pen Inject 52 Units Subcutaneous every morning      Lancets (ONETOUCH DELICA PLUS [id number]) MISC Test once daily varying time,      potassium chloride (KLOR-CON) 10 MEQ CR tablet [POTASSIUM CHLORIDE (KLOR-CON) 10 MEQ CR TABLET] Take 20 mEq by mouth 2 (two) times a day.      pregabalin (LYRICA) 50 MG capsule Take 50 mg by mouth 2 times daily      spironolactone-hydrochlorothiazide (ALDACTAZIDE) 25-25 mg tablet Take by mouth daily Patient takes one-half tablet daily.      warfarin (COUMADIN) 2.5 MG tablet [WARFARIN (COUMADIN) 2.5 MG TABLET] Adjust dose based on INR results as directed.      DULoxetine (CYMBALTA) 30 MG capsule Take 30 mg by mouth daily (Patient not taking: Reported on 4/8/2024)      gabapentin (NEURONTIN) 100 MG capsule Take 100 mg by mouth 2 times daily (Patient not taking: Reported on 4/8/2024)      insulin degludec  "(TRESIBA FLEXTOUCH) 100 UNIT/ML pen Inject 20 Units Subcutaneous (Patient not taking: Reported on 4/8/2024)      ONETOUCH VERIO IQ test strip Test 3 times a day. (Patient not taking: Reported on 4/8/2024)      pantoprazole (PROTONIX) 40 MG EC tablet Take 40 mg by mouth daily (Patient not taking: Reported on 4/8/2024)      SOLIQUA pen Inject 50 Units Subcutaneous every morning (before breakfast) (Patient not taking: Reported on 4/8/2024)      Allergies   Allergen Reactions    Pregabalin Other (See Comments)     Foggy thinking    Ace Inhibitors Cough    Empagliflozin Other (See Comments)     weakness    Irbesartan Other (See Comments)     exhaustion    Metformin GI Disturbance     2013.  Retry 2016- gastrointestinal intolerance again    Pioglitazone Other (See Comments)     Hand leg and facial swelling         Lab Results    Chemistry/lipid CBC Cardiac Enzymes/BNP/TSH/INR   No results for input(s): \"CHOL\", \"HDL\", \"LDL\", \"TRIG\", \"CHOLHDLRATIO\" in the last 02449 hours.  No results for input(s): \"LDL\" in the last 16030 hours.  Recent Labs   Lab Test 09/06/23  1028   *   POTASSIUM 4.0   CHLORIDE 99   CO2 21*   *   BUN 20.2   CR 0.89   GFRESTIMATED 64   HOLLIS 9.4     Recent Labs   Lab Test 09/06/23  1028 08/31/23  0751 07/30/23  1000   CR 0.89 0.84 0.9     No results for input(s): \"A1C\" in the last 36631 hours. Recent Labs   Lab Test 09/06/23  1028   WBC 9.2   HGB 13.1   HCT 38.9   MCV 89        Recent Labs   Lab Test 09/06/23  1028 08/31/23  0751   HGB 13.1 13.4    No results for input(s): \"TROPONINI\" in the last 47636 hours.  No results for input(s): \"BNP\", \"NTBNPI\", \"NTBNP\" in the last 37651 hours.  No results for input(s): \"TSH\" in the last 64141 hours.  Recent Labs   Lab Test 09/15/23  0000 09/06/23  1028 08/23/23  1011   INR 2.6* 2.57* 3.2*        35 minutes spent on the date of encounter doing education, consent signing, chart prep/review, review of outside records, review of test results, " interpretation with above tests, patient visit, and documentation.      This note has been dictated using voice recognition software. Any grammatical or context distortions are unintentional and inherent to the software.    Millie Randall PA-C  Structural Heart Program  [facility]      Thank you for allowing me to participate in the care of your patient.      Sincerely,     Millie Randall PA-C     [facility]  cc:   Timur Corona MD  [address]  [place] [address]

## 2024-04-08 NOTE — PROGRESS NOTES
Yvonne MARTIN [facility]  [address]   [place] [address]  [phone] (home)     Patient in to see RN for Pre-LAAC visit on 4/8/24    All pre-procedure labs drawn: 4/8/24   EKG obtained: 4/8/24 - A-fib  Labs reviewed: 4/8/24 - Collected, will review when resulted  Renal Issues: Yes  Diabetic?: Yes  Device?: No  Type:  N/A  Implantable Device/Stimulator? : No    Patient instructed to be NPO after 11pm 4/9/24.  Patient instructed to shower the evening before or the morning of the procedure.  Leave all valuables at home (jewlery, rings, watches, large amounts of money).  Patient understands there are two visitors allowed during patients stay.   Patient instructed to arrange for transportation home following procedure from a responsible family member of friend. No driving for at least 72 hours post-procedure.  Patient instructed to have a responsible adult with them for 24 hours post-procedure.  Post-procedure follow up process.    Patient instructed on medications:   Take 1/2 dose of Lantus. She would like us to call Vandana at the Diabetic clinic to review dosing as she has been running very high since stopping the soloquil injection. She is now on Lantus and was instructed to take 57 Units tomorrow as she has been running so high.     Aspirin 81mg morning of procedure: AM of in [facility]    Education was given to patient regarding what to expect pre-procedure.     Patient was informed procedure will be done at [facility], [address] [place] [address]. They have been instructed to check in at the  at the Hospital and their arrival time is at 7am    LAAC procedure, HALI  and blood consent signed at the time of the appt: 4/8/24    All questions were answered to family and patient by RN.    Self present at the time of appointment.  and Daughter will be present day of procedure.    Kya Vela RN BSN  Structural Heart Coordinator   [facility]  [facility]  [phone]    Patient Active Problem List   Diagnosis    Esophageal Reflux    Essential hypertension    Atrial flutter (H)    SHAVON on CPAP    Type 2 Diabetes Mellitus    Dyslipidemia    Status post catheter ablation of atrial fibrillation    Chronic kidney disease, stage 3a (H)    Persistent atrial fibrillation (H)     Current Outpatient Medications   Medication Sig Dispense Refill    allopurinol (ZYLOPRIM) 100 MG tablet [ALLOPURINOL (ZYLOPRIM) 100 MG TABLET] Take 100 mg by mouth 2 (two) times a day.             aspirin 81 MG EC tablet Take 1 tablet (81 mg) by mouth daily      atorvastatin (LIPITOR) 40 MG tablet Take 1 tablet by mouth daily      BD SUELLEN U/F 32G X 4 MM insulin pen needle       DULoxetine (CYMBALTA) 30 MG capsule Take 30 mg by mouth daily (Patient not taking: Reported on 4/8/2024)      gabapentin (NEURONTIN) 100 MG capsule Take 100 mg by mouth 2 times daily (Patient not taking: Reported on 4/8/2024)      glipiZIDE (GLUCOTROL XL) 10 MG 24 hr tablet Take 2 tablets by mouth daily      insulin degludec (TRESIBA FLEXTOUCH) 100 UNIT/ML pen Inject 20 Units Subcutaneous (Patient not taking: Reported on 4/8/2024)      insulin glargine (LANTUS PEN) 100 UNIT/ML pen Inject 52 Units Subcutaneous every morning      Lancets (ONETOUCH DELICA PLUS [id number]) MISC Test once daily varying time,      ONETOUCH VERIO IQ test strip Test 3 times a day. (Patient not taking: Reported on 4/8/2024)      pantoprazole (PROTONIX) 40 MG EC tablet Take 40 mg by mouth daily (Patient not taking: Reported on 4/8/2024)      potassium chloride (KLOR-CON) 10 MEQ CR tablet [POTASSIUM CHLORIDE (KLOR-CON) 10 MEQ CR TABLET] Take 20 mEq by mouth 2 (two) times a day.      pregabalin (LYRICA) 50 MG capsule Take 50 mg by mouth 2 times daily      SOLIQUA pen Inject 50 Units Subcutaneous every morning (before breakfast) (Patient not taking: Reported on 4/8/2024)      spironolactone-hydrochlorothiazide (ALDACTAZIDE) 25-25 mg tablet Take by  mouth daily Patient takes one-half tablet daily.      warfarin (COUMADIN) 2.5 MG tablet [WARFARIN (COUMADIN) 2.5 MG TABLET] Adjust dose based on INR results as directed.       No current facility-administered medications for this visit.      Allergies   Allergen Reactions    Pregabalin Other (See Comments)     Foggy thinking    Ace Inhibitors Cough    Empagliflozin Other (See Comments)     weakness    Irbesartan Other (See Comments)     exhaustion    Metformin GI Disturbance     2013.  Retry 2016- gastrointestinal intolerance again    Pioglitazone Other (See Comments)     Hand leg and facial swelling

## 2024-04-08 NOTE — H&P (VIEW-ONLY)
HEART CARE ENCOUNTER NOTE       [facility]  [phone]      Assessment/Recommendations   1.  Persistent atrial fibrillation/atrial flutter: I have personally reviewed this patient's chart and have spoken with the patient about the treatment options, including JENNIFER device.  She has a [id number] score of 7 for age >75, female gender, hypertension, diabetes, history of TIA.  She has a HAS-BLED score of 3 for age, bleeding predisposition, history of TIA.   She is not a good candidate for long-term anticoagulation due to balance issues which increases her risk for falls.    She is taking aspirin 81 mg daily in addition to warfarin.  After implant, she will continue on aspirin 81 mg daily and warfarin for 45 days postprocedure.  She will then stop warfarin and start DAPT with aspirin 81 mg daily and Plavix 75 mg daily until 6 months postprocedure.  She will then stop Plavix and remain on aspirin 81 mg daily indefinitely.  She will have a CT scan approximately 3 months and 1 year post-implant.  She understands that the risks of the procedure are <2% and include, but are not limited to device embolization, air embolism, myocardial perforation, device thrombosis, ASD, stroke, or death.  We discussed expected recovery and follow-up.       The patient is a good candidate for proceeding with left atrial appendage implant.  Her questions were answered to her satisfaction.  Written consents have been signed and witnessed by me.  Her labs will be reviewed when resulted.  Her EKG has been reviewed.     2.  Hypertension -blood pressure is controlled.  Continue spironolactone-hydrochlorothiazide    3.  Chronic kidney disease stage III -creatinine is stable on today's labs    4.  Obstructive sleep apnea -wears CPAP    5.  Dyslipidemia -last LDL 51 (December 2023).  Continue Lipitor 40 mg daily    6.  Type 2 diabetes mellitus -last A1c 9 (December 2023).  Blood glucose is elevated on today's labs.  She should  follow-up with her primary care provider regarding her diabetic regimen    7.  History of TIA -approximately 10 to 15 years ago after her warfarin was stopped.  No residual deficits       History of Present Illness/Subjective    Yvonne Fatima is a 84 year old female who comes in today for history and physical prior to insertion of left atrial appendage occulsion device.    Yvonne Fatima has a past history of persistent atrial fibrillation, atrial flutter, hypertension, dyslipidemia, chronic kidney disease stage III, type 2 diabetes mellitus, obstructive sleep apnea, history of TIA    She has a history of TIA 10 to 15 years ago when her warfarin was stopped while [place] of state.  She has no residual symptoms.  She denies a previous history of DVT or PE.  She denies easy bruising.  She denies any major bleeding issues.  She does report a history of hemorrhoids and occasionally reports blood in her stool.  She does report some balance issues as she has aged.  She denies any recent falls.  She denies NSAID use.  She consumes approximately 1 alcoholic drink weekly.  She denies painful or difficulty swallowing.  She denies a family history of bleeding or nosebleeds.    Yvonne Fatima denies chest discomfort, palpitations, shortness of breath, paroxysmal nocturnal dyspnea, orthopnea, lightheadedness, dizziness, pre-syncope, or syncope.  Yvonne Fatima also denies any weight loss, changes in appetite, nausea or vomiting.     Medical, surgical, family, social history, and medications were reviewed and updated as necessary.    ECHO results (from 8/7/2023):  Interpretation Summary     The left ventricle is normal in size.  Left ventricular function is normal.The ejection fraction is 60-65%.  Left ventricular diastolic function is abnormal.  The right ventricle is mildly dilated.  The left atrium is moderately dilated. The right atrium is moderately dilated.  There is severe mitral annular calcification.  There  "is mild mitral stenosis.  There is moderate (2+) tricuspid regurgitation.  Right ventricular systolic pressure is elevated, consistent with mild  pulmonary hypertension.    EKG (personally reviewed and interpreted):  Atrial fibrillation, ventricular rate 80     Physical Examination Review of Systems   Vitals: /70 (BP Location: Left arm, Patient Position: Sitting, Cuff Size: Adult Regular)   Pulse 80   Ht 1.549 m (5' 1\")   Wt 65.8 kg (145 lb)   BMI 27.40 kg/m    BMI= Body mass index is 27.4 kg/m .  Wt Readings from Last 3 Encounters:   04/08/24 65.8 kg (145 lb)   02/08/24 65.6 kg (144 lb 11.2 oz)   12/28/23 65.3 kg (144 lb)       General Appearance:   Alert, cooperative and in no acute distress   ENT/Mouth: membranes moist, no oral lesions or bleeding gums.      EYES:  no scleral icterus, normal conjunctivae   Neck: Thyroid not visualized   Chest/Lungs:   lungs are clear to auscultation, no rales or wheezing   Cardiovascular:   irregular. Normal first and second heart sounds with no murmurs, rubs or gallops; the carotid, radial and posterior tibial pulses are intact, no edema bilaterally    Abdomen:  Soft and nontender. Bowel sounds are present in all quadrants   Extremities: no cyanosis or clubbing   Skin: no xanthelasma, warm.    Neurologic: normal gait, normal  bilateral, no tremors   Psychiatric: Normal mood and affect       Please refer above for cardiac ROS details.      Medical History  Surgical History Family History Social History   Past Medical History:   Diagnosis Date    Atrial fibrillation (H)     [id number] score of 7-on chronic warfarin Previously failed Sotalol and Multaq therapy PVI 3/11 Repeat PVI 7/11  Recurrent A fib, most recent CV Feb 2013 Rx sotalol     Atrial flutter (H)     Atypical flutter with RVR after second PVI in July 2011      Esophageal reflux     Created by Conversion     Essential hypertension          Status post catheter ablation of atrial fibrillation 6/3/2019    " PVI 3/11(PVI/Cryo + CTI line) Repeat PVI  (PVI/RF + CFE + Roof line + GREGORIO line)     Past Surgical History:   Procedure Laterality Date    EP ABLATION PULMONARY VEIN ISOLATION N/A 2023    Procedure: Ablation Atrial Fibrillation;  Surgeon: Timur Corona MD;  Location: [facility]    HC REMOVE TONSILS/ADENOIDS,<11 Y/O      Description: Tonsillectomy With Adenoidectomy;  Recorded: 10/17/2012;    HC REVISE MEDIAN N/CARPAL TUNNEL SURG      Description: Neuroplasty Decompression Median Nerve At Carpal Tunnel;  Recorded: 10/17/2012;    IA ABLATE HEART DYSRHYTHM FOCUS      Description: Catheter Ablation Atrial Fibrillation;  Recorded: 2013;  Comments: PVI 3-7-2011 (PVI/cryo + CTI line); ; Repeat PVI 2011 (PVI/RF + CFE + Roof line + GREGORIO line)    IA ABLATE HEART DYSRHYTHM FOCUS      Description: Catheter Ablation Atrial Fibrillation;  Recorded: 2014;  Comments: PVI 3-7-2011 (PVI/cryo + CTI line); ; Repeat PVI 2011 (PVI/RF + CFE + Roof line + GREGORIO line)    IA CARDIOVERSION ELECTIVE ARRHYTHMIA EXTERNAL       Fib 10/17/12; ; Flutter 2/15/13; AFl 2019     Family History   Problem Relation Age of Onset    Aneurysm Mother         brain    Emphysema Father     Lung Cancer Sister     Hypertension Brother     Lung Cancer Sister     Lung Cancer Sister     Breast Cancer Sister     Social History     Socioeconomic History    Marital status:      Spouse name: Not on file    Number of children: 3    Years of education: Not on file    Highest education level: Not on file   Occupational History    Not on file   Tobacco Use    Smoking status: Former     Types: Cigarettes     Quit date: 1962     Years since quittin.0    Smokeless tobacco: Never   Vaping Use    Vaping Use: Never used   Substance and Sexual Activity    Alcohol use: Yes     Comment: Alcoholic Drinks/day: 6 per month    Drug use: No    Sexual activity: Yes     Partners: Male   Other Topics Concern    Not on file   Social  History Narrative    Not on file     Social Determinants of Health     Financial Resource Strain: Not on file   Food Insecurity: Not on file   Transportation Needs: Not on file   Physical Activity: Not on file   Stress: Not on file   Social Connections: Not on file   Interpersonal Safety: Not on file   Housing Stability: Not on file          Medications  Allergies   Current Outpatient Medications   Medication Sig Dispense Refill    allopurinol (ZYLOPRIM) 100 MG tablet [ALLOPURINOL (ZYLOPRIM) 100 MG TABLET] Take 100 mg by mouth 2 (two) times a day.             aspirin 81 MG EC tablet Take 1 tablet (81 mg) by mouth daily      atorvastatin (LIPITOR) 40 MG tablet Take 1 tablet by mouth daily      BD SUELLEN U/F 32G X 4 MM insulin pen needle       glipiZIDE (GLUCOTROL XL) 10 MG 24 hr tablet Take 2 tablets by mouth daily      insulin glargine (LANTUS PEN) 100 UNIT/ML pen Inject 52 Units Subcutaneous every morning      Lancets (ONETOUCH DELICA PLUS [id number]) MISC Test once daily varying time,      potassium chloride (KLOR-CON) 10 MEQ CR tablet [POTASSIUM CHLORIDE (KLOR-CON) 10 MEQ CR TABLET] Take 20 mEq by mouth 2 (two) times a day.      pregabalin (LYRICA) 50 MG capsule Take 50 mg by mouth 2 times daily      spironolactone-hydrochlorothiazide (ALDACTAZIDE) 25-25 mg tablet Take by mouth daily Patient takes one-half tablet daily.      warfarin (COUMADIN) 2.5 MG tablet [WARFARIN (COUMADIN) 2.5 MG TABLET] Adjust dose based on INR results as directed.      DULoxetine (CYMBALTA) 30 MG capsule Take 30 mg by mouth daily (Patient not taking: Reported on 4/8/2024)      gabapentin (NEURONTIN) 100 MG capsule Take 100 mg by mouth 2 times daily (Patient not taking: Reported on 4/8/2024)      insulin degludec (TRESIBA FLEXTOUCH) 100 UNIT/ML pen Inject 20 Units Subcutaneous (Patient not taking: Reported on 4/8/2024)      ONETOUCH VERIO IQ test strip Test 3 times a day. (Patient not taking: Reported on 4/8/2024)      pantoprazole (PROTONIX)  "40 MG EC tablet Take 40 mg by mouth daily (Patient not taking: Reported on 4/8/2024)      SOLIQUA pen Inject 50 Units Subcutaneous every morning (before breakfast) (Patient not taking: Reported on 4/8/2024)      Allergies   Allergen Reactions    Pregabalin Other (See Comments)     Foggy thinking    Ace Inhibitors Cough    Empagliflozin Other (See Comments)     weakness    Irbesartan Other (See Comments)     exhaustion    Metformin GI Disturbance     2013.  Retry 2016- gastrointestinal intolerance again    Pioglitazone Other (See Comments)     Hand leg and facial swelling         Lab Results    Chemistry/lipid CBC Cardiac Enzymes/BNP/TSH/INR   No results for input(s): \"CHOL\", \"HDL\", \"LDL\", \"TRIG\", \"CHOLHDLRATIO\" in the last 14357 hours.  No results for input(s): \"LDL\" in the last 51421 hours.  Recent Labs   Lab Test 09/06/23  1028   *   POTASSIUM 4.0   CHLORIDE 99   CO2 21*   *   BUN 20.2   CR 0.89   GFRESTIMATED 64   HOLLIS 9.4     Recent Labs   Lab Test 09/06/23  1028 08/31/23  0751 07/30/23  1000   CR 0.89 0.84 0.9     No results for input(s): \"A1C\" in the last 63386 hours. Recent Labs   Lab Test 09/06/23  1028   WBC 9.2   HGB 13.1   HCT 38.9   MCV 89        Recent Labs   Lab Test 09/06/23  1028 08/31/23  0751   HGB 13.1 13.4    No results for input(s): \"TROPONINI\" in the last 51570 hours.  No results for input(s): \"BNP\", \"NTBNPI\", \"NTBNP\" in the last 14917 hours.  No results for input(s): \"TSH\" in the last 99968 hours.  Recent Labs   Lab Test 09/15/23  0000 09/06/23  1028 08/23/23  1011   INR 2.6* 2.57* 3.2*        35 minutes spent on the date of encounter doing education, consent signing, chart prep/review, review of outside records, review of test results, interpretation with above tests, patient visit, and documentation.      This note has been dictated using voice recognition software. Any grammatical or context distortions are unintentional and inherent to the software.    Millie Randall, " PA-C  Structural Heart Program  [facility]

## 2024-04-08 NOTE — PROGRESS NOTES
HEART CARE ENCOUNTER NOTE       [facility]  [phone]      Assessment/Recommendations   1.  Persistent atrial fibrillation/atrial flutter: I have personally reviewed this patient's chart and have spoken with the patient about the treatment options, including JENNIFER device.  She has a [id number] score of 7 for age >75, female gender, hypertension, diabetes, history of TIA.  She has a HAS-BLED score of 3 for age, bleeding predisposition, history of TIA.   She is not a good candidate for long-term anticoagulation due to balance issues which increases her risk for falls.    She is taking aspirin 81 mg daily in addition to warfarin.  After implant, she will continue on aspirin 81 mg daily and warfarin for 45 days postprocedure.  She will then stop warfarin and start DAPT with aspirin 81 mg daily and Plavix 75 mg daily until 6 months postprocedure.  She will then stop Plavix and remain on aspirin 81 mg daily indefinitely.  She will have a CT scan approximately 3 months and 1 year post-implant.  She understands that the risks of the procedure are <2% and include, but are not limited to device embolization, air embolism, myocardial perforation, device thrombosis, ASD, stroke, or death.  We discussed expected recovery and follow-up.       The patient is a good candidate for proceeding with left atrial appendage implant.  Her questions were answered to her satisfaction.  Written consents have been signed and witnessed by me.  Her labs will be reviewed when resulted.  Her EKG has been reviewed.     2.  Hypertension -blood pressure is controlled.  Continue spironolactone-hydrochlorothiazide    3.  Chronic kidney disease stage III -creatinine is stable on today's labs    4.  Obstructive sleep apnea -wears CPAP    5.  Dyslipidemia -last LDL 51 (December 2023).  Continue Lipitor 40 mg daily    6.  Type 2 diabetes mellitus -last A1c 9 (December 2023).  Blood glucose is elevated on today's labs.  She should  follow-up with her primary care provider regarding her diabetic regimen    7.  History of TIA -approximately 10 to 15 years ago after her warfarin was stopped.  No residual deficits       History of Present Illness/Subjective    Yvonne Fatima is a 84 year old female who comes in today for history and physical prior to insertion of left atrial appendage occulsion device.    Yvonne Fatima has a past history of persistent atrial fibrillation, atrial flutter, hypertension, dyslipidemia, chronic kidney disease stage III, type 2 diabetes mellitus, obstructive sleep apnea, history of TIA    She has a history of TIA 10 to 15 years ago when her warfarin was stopped while [place] of state.  She has no residual symptoms.  She denies a previous history of DVT or PE.  She denies easy bruising.  She denies any major bleeding issues.  She does report a history of hemorrhoids and occasionally reports blood in her stool.  She does report some balance issues as she has aged.  She denies any recent falls.  She denies NSAID use.  She consumes approximately 1 alcoholic drink weekly.  She denies painful or difficulty swallowing.  She denies a family history of bleeding or nosebleeds.    Yvonne Fatima denies chest discomfort, palpitations, shortness of breath, paroxysmal nocturnal dyspnea, orthopnea, lightheadedness, dizziness, pre-syncope, or syncope.  Yvonne Fatima also denies any weight loss, changes in appetite, nausea or vomiting.     Medical, surgical, family, social history, and medications were reviewed and updated as necessary.    ECHO results (from 8/7/2023):  Interpretation Summary     The left ventricle is normal in size.  Left ventricular function is normal.The ejection fraction is 60-65%.  Left ventricular diastolic function is abnormal.  The right ventricle is mildly dilated.  The left atrium is moderately dilated. The right atrium is moderately dilated.  There is severe mitral annular calcification.  There  "is mild mitral stenosis.  There is moderate (2+) tricuspid regurgitation.  Right ventricular systolic pressure is elevated, consistent with mild  pulmonary hypertension.    EKG (personally reviewed and interpreted):  Atrial fibrillation, ventricular rate 80     Physical Examination Review of Systems   Vitals: /70 (BP Location: Left arm, Patient Position: Sitting, Cuff Size: Adult Regular)   Pulse 80   Ht 1.549 m (5' 1\")   Wt 65.8 kg (145 lb)   BMI 27.40 kg/m    BMI= Body mass index is 27.4 kg/m .  Wt Readings from Last 3 Encounters:   04/08/24 65.8 kg (145 lb)   02/08/24 65.6 kg (144 lb 11.2 oz)   12/28/23 65.3 kg (144 lb)       General Appearance:   Alert, cooperative and in no acute distress   ENT/Mouth: membranes moist, no oral lesions or bleeding gums.      EYES:  no scleral icterus, normal conjunctivae   Neck: Thyroid not visualized   Chest/Lungs:   lungs are clear to auscultation, no rales or wheezing   Cardiovascular:   irregular. Normal first and second heart sounds with no murmurs, rubs or gallops; the carotid, radial and posterior tibial pulses are intact, no edema bilaterally    Abdomen:  Soft and nontender. Bowel sounds are present in all quadrants   Extremities: no cyanosis or clubbing   Skin: no xanthelasma, warm.    Neurologic: normal gait, normal  bilateral, no tremors   Psychiatric: Normal mood and affect       Please refer above for cardiac ROS details.      Medical History  Surgical History Family History Social History   Past Medical History:   Diagnosis Date    Atrial fibrillation (H)     [id number] score of 7-on chronic warfarin Previously failed Sotalol and Multaq therapy PVI 3/11 Repeat PVI 7/11  Recurrent A fib, most recent CV Feb 2013 Rx sotalol     Atrial flutter (H)     Atypical flutter with RVR after second PVI in July 2011      Esophageal reflux     Created by Conversion     Essential hypertension          Status post catheter ablation of atrial fibrillation 6/3/2019    " PVI 3/11(PVI/Cryo + CTI line) Repeat PVI  (PVI/RF + CFE + Roof line + GREGORIO line)     Past Surgical History:   Procedure Laterality Date    EP ABLATION PULMONARY VEIN ISOLATION N/A 2023    Procedure: Ablation Atrial Fibrillation;  Surgeon: Timur Corona MD;  Location: [facility]    HC REMOVE TONSILS/ADENOIDS,<11 Y/O      Description: Tonsillectomy With Adenoidectomy;  Recorded: 10/17/2012;    HC REVISE MEDIAN N/CARPAL TUNNEL SURG      Description: Neuroplasty Decompression Median Nerve At Carpal Tunnel;  Recorded: 10/17/2012;    [place] ABLATE HEART DYSRHYTHM FOCUS      Description: Catheter Ablation Atrial Fibrillation;  Recorded: 2013;  Comments: PVI 3-7-2011 (PVI/cryo + CTI line); ; Repeat PVI 2011 (PVI/RF + CFE + Roof line + GREGORIO line)    WV ABLATE HEART DYSRHYTHM FOCUS      Description: Catheter Ablation Atrial Fibrillation;  Recorded: 2014;  Comments: PVI 3-7-2011 (PVI/cryo + CTI line); ; Repeat PVI 2011 (PVI/RF + CFE + Roof line + GREGORIO line)    [place] CARDIOVERSION ELECTIVE ARRHYTHMIA EXTERNAL       Fib 10/17/12; ; Flutter 2/15/13; AFl 2019     Family History   Problem Relation Age of Onset    Aneurysm Mother         brain    Emphysema Father     Lung Cancer Sister     Hypertension Brother     Lung Cancer Sister     Lung Cancer Sister     Breast Cancer Sister     Social History     Socioeconomic History    Marital status:      Spouse name: Not on file    Number of children: 3    Years of education: Not on file    Highest education level: Not on file   Occupational History    Not on file   Tobacco Use    Smoking status: Former     Types: Cigarettes     Quit date: 1962     Years since quittin.0    Smokeless tobacco: Never   Vaping Use    Vaping Use: Never used   Substance and Sexual Activity    Alcohol use: Yes     Comment: Alcoholic Drinks/day: 6 per month    Drug use: No    Sexual activity: Yes     Partners: Male   Other Topics Concern    Not on file   Social  History Narrative    Not on file     Social Determinants of Health     Financial Resource Strain: Not on file   Food Insecurity: Not on file   Transportation Needs: Not on file   Physical Activity: Not on file   Stress: Not on file   Social Connections: Not on file   Interpersonal Safety: Not on file   Housing Stability: Not on file          Medications  Allergies   Current Outpatient Medications   Medication Sig Dispense Refill    allopurinol (ZYLOPRIM) 100 MG tablet [ALLOPURINOL (ZYLOPRIM) 100 MG TABLET] Take 100 mg by mouth 2 (two) times a day.             aspirin 81 MG EC tablet Take 1 tablet (81 mg) by mouth daily      atorvastatin (LIPITOR) 40 MG tablet Take 1 tablet by mouth daily      BD SUELLEN U/F 32G X 4 MM insulin pen needle       glipiZIDE (GLUCOTROL XL) 10 MG 24 hr tablet Take 2 tablets by mouth daily      insulin glargine (LANTUS PEN) 100 UNIT/ML pen Inject 52 Units Subcutaneous every morning      Lancets (ONETOUCH DELICA PLUS [id number]) MISC Test once daily varying time,      potassium chloride (KLOR-CON) 10 MEQ CR tablet [POTASSIUM CHLORIDE (KLOR-CON) 10 MEQ CR TABLET] Take 20 mEq by mouth 2 (two) times a day.      pregabalin (LYRICA) 50 MG capsule Take 50 mg by mouth 2 times daily      spironolactone-hydrochlorothiazide (ALDACTAZIDE) 25-25 mg tablet Take by mouth daily Patient takes one-half tablet daily.      warfarin (COUMADIN) 2.5 MG tablet [WARFARIN (COUMADIN) 2.5 MG TABLET] Adjust dose based on INR results as directed.      DULoxetine (CYMBALTA) 30 MG capsule Take 30 mg by mouth daily (Patient not taking: Reported on 4/8/2024)      gabapentin (NEURONTIN) 100 MG capsule Take 100 mg by mouth 2 times daily (Patient not taking: Reported on 4/8/2024)      insulin degludec (TRESIBA FLEXTOUCH) 100 UNIT/ML pen Inject 20 Units Subcutaneous (Patient not taking: Reported on 4/8/2024)      ONETOUCH VERIO IQ test strip Test 3 times a day. (Patient not taking: Reported on 4/8/2024)      pantoprazole (PROTONIX)  "40 MG EC tablet Take 40 mg by mouth daily (Patient not taking: Reported on 4/8/2024)      SOLIQUA pen Inject 50 Units Subcutaneous every morning (before breakfast) (Patient not taking: Reported on 4/8/2024)      Allergies   Allergen Reactions    Pregabalin Other (See Comments)     Foggy thinking    Ace Inhibitors Cough    Empagliflozin Other (See Comments)     weakness    Irbesartan Other (See Comments)     exhaustion    Metformin GI Disturbance     2013.  Retry 2016- gastrointestinal intolerance again    Pioglitazone Other (See Comments)     Hand leg and facial swelling         Lab Results    Chemistry/lipid CBC Cardiac Enzymes/BNP/TSH/INR   No results for input(s): \"CHOL\", \"HDL\", \"LDL\", \"TRIG\", \"CHOLHDLRATIO\" in the last 19359 hours.  No results for input(s): \"LDL\" in the last 20155 hours.  Recent Labs   Lab Test 09/06/23  1028   *   POTASSIUM 4.0   CHLORIDE 99   CO2 21*   *   BUN 20.2   CR 0.89   GFRESTIMATED 64   HOLLIS 9.4     Recent Labs   Lab Test 09/06/23  1028 08/31/23  0751 07/30/23  1000   CR 0.89 0.84 0.9     No results for input(s): \"A1C\" in the last 85471 hours. Recent Labs   Lab Test 09/06/23  1028   WBC 9.2   HGB 13.1   HCT 38.9   MCV 89        Recent Labs   Lab Test 09/06/23  1028 08/31/23  0751   HGB 13.1 13.4    No results for input(s): \"TROPONINI\" in the last 81042 hours.  No results for input(s): \"BNP\", \"NTBNPI\", \"NTBNP\" in the last 96469 hours.  No results for input(s): \"TSH\" in the last 04547 hours.  Recent Labs   Lab Test 09/15/23  0000 09/06/23  1028 08/23/23  1011   INR 2.6* 2.57* 3.2*        35 minutes spent on the date of encounter doing education, consent signing, chart prep/review, review of outside records, review of test results, interpretation with above tests, patient visit, and documentation.      This note has been dictated using voice recognition software. Any grammatical or context distortions are unintentional and inherent to the software.    Millie Randall, " PA-C  Structural Heart Program  [facility]

## 2024-04-08 NOTE — PROGRESS NOTES
MODIFIED CHARLOTTE SCALE   Timepoint: Pre-LAAC    Previous score: initial CHARLOTTE    Score Description   0 No symptoms at all   1 No significant disability despite symptoms; able to carry out all usual duties and activities   2 Slight disability; unable to carry out all previous activities, but able to look after own affairs without assistance   3 Moderate disability; requiring some help, but able to walk without assistance   4 Moderately severe disability; unable to walk without assistance and unable to attend to own bodily needs without assistance   5 Severe disability; bedridden, incontinent and requiring constant nursing care and attention   6 Dead    Total score (0 - 6):  0    Hx TIA no residual effects on functional status.     Change in score if s/p LAAC? No  If yes, notify implanting cardiologist.    Kya Vela RN BSN  Structural Heart Coordinator   [facility]  [phone]

## 2024-04-09 ENCOUNTER — TELEPHONE (OUTPATIENT)
Dept: CARDIOLOGY | Facility: CLINIC | Age: 84
End: 2024-04-09
Payer: COMMERCIAL

## 2024-04-09 LAB
ATRIAL RATE - MUSE: 93 BPM
DIASTOLIC BLOOD PRESSURE - MUSE: NORMAL MMHG
INTERPRETATION ECG - MUSE: NORMAL
P AXIS - MUSE: NORMAL DEGREES
PR INTERVAL - MUSE: NORMAL MS
QRS DURATION - MUSE: 86 MS
QT - MUSE: 376 MS
QTC - MUSE: 433 MS
R AXIS - MUSE: 26 DEGREES
SYSTOLIC BLOOD PRESSURE - MUSE: NORMAL MMHG
T AXIS - MUSE: 28 DEGREES
VENTRICULAR RATE- MUSE: 80 BPM

## 2024-04-09 NOTE — TELEPHONE ENCOUNTER
Left message this AM for Vandana [phone]. Incoming call from Ellyn stating that she was given orders to not take half dose and take her full strength Lantus in the AM due to her BG being so high. Call placed to Vandana and left message waiting for call back. -SC

## 2024-04-09 NOTE — TELEPHONE ENCOUNTER
Incoming call from Vandana. Discussed patient blood sugar trends, given Soliqua hold per pharmacy guidelines. Novolog prescribed for today only to help bring blood sugars down, but Vandana is wondering if there is a threshold for her procedure moving forward tomorrow with her rising blood sugars. Patient has continuous glucose monitor on. Informed Vandana writer will call PA for recommendations.    Phone call to KG Farley to discuss case. She stated ok for target glucose no higher than 350-400 prior to procedure. If needed, patient can be given coverage for hyperglycemia tomorrow morning. Phone call to Vandana, discussed information. She is appreciative, she will continue to instruct patient to take half dose of lantus tomorrow morning. No further questions at this time. [facility]

## 2024-04-10 ENCOUNTER — HOSPITAL ENCOUNTER (INPATIENT)
Facility: HOSPITAL | Age: 84
LOS: 1 days | Discharge: HOME | DRG: 274 | End: 2024-04-10
Attending: INTERNAL MEDICINE | Admitting: INTERNAL MEDICINE
Payer: COMMERCIAL

## 2024-04-10 ENCOUNTER — HOSPITAL ENCOUNTER (OUTPATIENT)
Dept: CARDIOLOGY | Facility: HOSPITAL | Age: 84
Discharge: HOME | DRG: 274 | End: 2024-04-10
Attending: INTERNAL MEDICINE | Admitting: INTERNAL MEDICINE
Payer: COMMERCIAL

## 2024-04-10 ENCOUNTER — ANESTHESIA EVENT (OUTPATIENT)
Dept: CARDIOLOGY | Facility: HOSPITAL | Age: 84
DRG: 274 | End: 2024-04-10
Payer: COMMERCIAL

## 2024-04-10 ENCOUNTER — ANESTHESIA (OUTPATIENT)
Dept: CARDIOLOGY | Facility: HOSPITAL | Age: 84
DRG: 274 | End: 2024-04-10
Payer: COMMERCIAL

## 2024-04-10 VITALS
HEIGHT: 61 IN | RESPIRATION RATE: 16 BRPM | BODY MASS INDEX: 27.03 KG/M2 | OXYGEN SATURATION: 95 % | TEMPERATURE: 98 F | SYSTOLIC BLOOD PRESSURE: 121 MMHG | DIASTOLIC BLOOD PRESSURE: 60 MMHG | HEART RATE: 84 BPM | WEIGHT: 143.2 LBS

## 2024-04-10 DIAGNOSIS — I48.19 PERSISTENT ATRIAL FIBRILLATION (H): ICD-10-CM

## 2024-04-10 PROBLEM — Z95.818 PRESENCE OF WATCHMAN LEFT ATRIAL APPENDAGE CLOSURE DEVICE: Status: ACTIVE | Noted: 2024-04-10

## 2024-04-10 PROBLEM — N18.31 CHRONIC KIDNEY DISEASE, STAGE 3A (H): Status: ACTIVE | Noted: 2023-07-18

## 2024-04-10 PROBLEM — Z98.890 STATUS POST CATHETER ABLATION OF ATRIAL FIBRILLATION: Status: ACTIVE | Noted: 2019-06-03

## 2024-04-10 LAB
ACT BLD: 414 SECONDS (ref 74–150)
APTT PPP: 38 SECONDS (ref 22–38)
BLD PROD TYP BPU: NORMAL
BLD PROD TYP BPU: NORMAL
BLOOD COMPONENT TYPE: NORMAL
BLOOD COMPONENT TYPE: NORMAL
CODING SYSTEM: NORMAL
CODING SYSTEM: NORMAL
CREAT SERPL-MCNC: 0.84 MG/DL (ref 0.51–0.95)
CROSSMATCH: NORMAL
CROSSMATCH: NORMAL
EGFRCR SERPLBLD CKD-EPI 2021: 68 ML/MIN/1.73M2
GLUCOSE BLDC GLUCOMTR-MCNC: 225 MG/DL (ref 70–99)
GLUCOSE BLDC GLUCOMTR-MCNC: 249 MG/DL (ref 70–99)
GLUCOSE BLDC GLUCOMTR-MCNC: 312 MG/DL (ref 70–99)
GLUCOSE BLDC GLUCOMTR-MCNC: 351 MG/DL (ref 70–99)
HGB BLD-MCNC: 12.3 G/DL (ref 11.7–15.7)
INR PPP: 2.69 (ref 0.85–1.15)
LVEF ECHO: NORMAL
UNIT ABO/RH: NORMAL
UNIT ABO/RH: NORMAL
UNIT NUMBER: NORMAL
UNIT NUMBER: NORMAL
UNIT STATUS: NORMAL
UNIT STATUS: NORMAL
UNIT TYPE ISBT: 7300
UNIT TYPE ISBT: 7300

## 2024-04-10 PROCEDURE — 02L73DK OCCLUSION OF LEFT ATRIAL APPENDAGE WITH INTRALUMINAL DEVICE, PERCUTANEOUS APPROACH: ICD-10-PCS | Performed by: INTERNAL MEDICINE

## 2024-04-10 PROCEDURE — 120N000001 HC R&B MED SURG/OB

## 2024-04-10 PROCEDURE — 93355 ECHO TRANSESOPHAGEAL (TEE): CPT | Performed by: INTERNAL MEDICINE

## 2024-04-10 PROCEDURE — 85018 HEMOGLOBIN: CPT | Performed by: INTERNAL MEDICINE

## 2024-04-10 PROCEDURE — 250N000009 HC RX 250: Performed by: ANESTHESIOLOGY

## 2024-04-10 PROCEDURE — 250N000013 HC RX MED GY IP 250 OP 250 PS 637: Performed by: INTERNAL MEDICINE

## 2024-04-10 PROCEDURE — 93355 ECHO TRANSESOPHAGEAL (TEE): CPT

## 2024-04-10 PROCEDURE — 250N000009 HC RX 250: Performed by: NURSE ANESTHETIST, CERTIFIED REGISTERED

## 2024-04-10 PROCEDURE — 258N000003 HC RX IP 258 OP 636: Performed by: INTERNAL MEDICINE

## 2024-04-10 PROCEDURE — 86923 COMPATIBILITY TEST ELECTRIC: CPT | Performed by: INTERNAL MEDICINE

## 2024-04-10 PROCEDURE — 258N000003 HC RX IP 258 OP 636: Performed by: NURSE ANESTHETIST, CERTIFIED REGISTERED

## 2024-04-10 PROCEDURE — 85730 THROMBOPLASTIN TIME PARTIAL: CPT | Performed by: INTERNAL MEDICINE

## 2024-04-10 PROCEDURE — 36415 COLL VENOUS BLD VENIPUNCTURE: CPT | Performed by: INTERNAL MEDICINE

## 2024-04-10 PROCEDURE — 82565 ASSAY OF CREATININE: CPT | Performed by: INTERNAL MEDICINE

## 2024-04-10 PROCEDURE — 258N000003 HC RX IP 258 OP 636: Performed by: ANESTHESIOLOGY

## 2024-04-10 PROCEDURE — 250N000009 HC RX 250: Performed by: INTERNAL MEDICINE

## 2024-04-10 PROCEDURE — 85610 PROTHROMBIN TIME: CPT | Performed by: INTERNAL MEDICINE

## 2024-04-10 PROCEDURE — 250N000011 HC RX IP 250 OP 636: Performed by: NURSE ANESTHETIST, CERTIFIED REGISTERED

## 2024-04-10 PROCEDURE — 272N000001 HC OR GENERAL SUPPLY STERILE: Performed by: INTERNAL MEDICINE

## 2024-04-10 PROCEDURE — 250N000012 HC RX MED GY IP 250 OP 636 PS 637: Performed by: ANESTHESIOLOGY

## 2024-04-10 PROCEDURE — 33340 PERQ CLSR TCAT L ATR APNDGE: CPT | Performed by: INTERNAL MEDICINE

## 2024-04-10 PROCEDURE — C1894 INTRO/SHEATH, NON-LASER: HCPCS | Performed by: INTERNAL MEDICINE

## 2024-04-10 PROCEDURE — 33340 PERQ CLSR TCAT L ATR APNDGE: CPT | Mod: Q0 | Performed by: INTERNAL MEDICINE

## 2024-04-10 PROCEDURE — 250N000011 HC RX IP 250 OP 636: Performed by: INTERNAL MEDICINE

## 2024-04-10 PROCEDURE — C1760 CLOSURE DEV, VASC: HCPCS | Performed by: INTERNAL MEDICINE

## 2024-04-10 PROCEDURE — 255N000002 HC RX 255 OP 636: Performed by: INTERNAL MEDICINE

## 2024-04-10 PROCEDURE — 85347 COAGULATION TIME ACTIVATED: CPT

## 2024-04-10 PROCEDURE — 370N000017 HC ANESTHESIA TECHNICAL FEE, PER MIN: Performed by: INTERNAL MEDICINE

## 2024-04-10 PROCEDURE — 250N000011 HC RX IP 250 OP 636: Performed by: ANESTHESIOLOGY

## 2024-04-10 PROCEDURE — 710N000010 HC RECOVERY PHASE 1, LEVEL 2, PER MIN

## 2024-04-10 PROCEDURE — C1889 IMPLANT/INSERT DEVICE, NOC: HCPCS | Performed by: INTERNAL MEDICINE

## 2024-04-10 DEVICE — OCCLUDER CV WATCHMAN FLX OD27 MM M635WU50270: Type: IMPLANTABLE DEVICE | Site: HEART | Status: FUNCTIONAL

## 2024-04-10 RX ORDER — ASPIRIN 81 MG/1
81 TABLET ORAL ONCE
Status: COMPLETED | OUTPATIENT
Start: 2024-04-10 | End: 2024-04-10

## 2024-04-10 RX ORDER — DEXTROSE MONOHYDRATE 25 G/50ML
25-50 INJECTION, SOLUTION INTRAVENOUS
Status: DISCONTINUED | OUTPATIENT
Start: 2024-04-10 | End: 2024-04-10 | Stop reason: HOSPADM

## 2024-04-10 RX ORDER — CEFAZOLIN SODIUM/WATER 2 G/20 ML
SYRINGE (ML) INTRAVENOUS PRN
Status: DISCONTINUED | OUTPATIENT
Start: 2024-04-10 | End: 2024-04-10

## 2024-04-10 RX ORDER — OXYCODONE HYDROCHLORIDE 5 MG/1
5 TABLET ORAL EVERY 4 HOURS PRN
Status: DISCONTINUED | OUTPATIENT
Start: 2024-04-10 | End: 2024-04-10 | Stop reason: HOSPADM

## 2024-04-10 RX ORDER — ONDANSETRON 2 MG/ML
4 INJECTION INTRAMUSCULAR; INTRAVENOUS EVERY 30 MIN PRN
Status: DISCONTINUED | OUTPATIENT
Start: 2024-04-10 | End: 2024-04-10 | Stop reason: HOSPADM

## 2024-04-10 RX ORDER — OXYCODONE HYDROCHLORIDE 5 MG/1
10 TABLET ORAL EVERY 4 HOURS PRN
Status: DISCONTINUED | OUTPATIENT
Start: 2024-04-10 | End: 2024-04-10 | Stop reason: HOSPADM

## 2024-04-10 RX ORDER — LIDOCAINE 40 MG/G
CREAM TOPICAL
Status: DISCONTINUED | OUTPATIENT
Start: 2024-04-10 | End: 2024-04-10 | Stop reason: HOSPADM

## 2024-04-10 RX ORDER — ONDANSETRON 2 MG/ML
4 INJECTION INTRAMUSCULAR; INTRAVENOUS EVERY 6 HOURS PRN
Status: DISCONTINUED | OUTPATIENT
Start: 2024-04-10 | End: 2024-04-10 | Stop reason: HOSPADM

## 2024-04-10 RX ORDER — NICOTINE POLACRILEX 4 MG
15-30 LOZENGE BUCCAL
Status: DISCONTINUED | OUTPATIENT
Start: 2024-04-10 | End: 2024-04-10 | Stop reason: HOSPADM

## 2024-04-10 RX ORDER — SODIUM CHLORIDE, SODIUM LACTATE, POTASSIUM CHLORIDE, CALCIUM CHLORIDE 600; 310; 30; 20 MG/100ML; MG/100ML; MG/100ML; MG/100ML
INJECTION, SOLUTION INTRAVENOUS CONTINUOUS
Status: DISCONTINUED | OUTPATIENT
Start: 2024-04-10 | End: 2024-04-10 | Stop reason: HOSPADM

## 2024-04-10 RX ORDER — SODIUM CHLORIDE 9 MG/ML
INJECTION, SOLUTION INTRAVENOUS CONTINUOUS PRN
Status: DISCONTINUED | OUTPATIENT
Start: 2024-04-10 | End: 2024-04-10

## 2024-04-10 RX ORDER — FENTANYL CITRATE 50 UG/ML
50 INJECTION, SOLUTION INTRAMUSCULAR; INTRAVENOUS EVERY 5 MIN PRN
Status: DISCONTINUED | OUTPATIENT
Start: 2024-04-10 | End: 2024-04-10 | Stop reason: HOSPADM

## 2024-04-10 RX ORDER — NALOXONE HYDROCHLORIDE 0.4 MG/ML
0.1 INJECTION, SOLUTION INTRAMUSCULAR; INTRAVENOUS; SUBCUTANEOUS
Status: DISCONTINUED | OUTPATIENT
Start: 2024-04-10 | End: 2024-04-10 | Stop reason: HOSPADM

## 2024-04-10 RX ORDER — ONDANSETRON 2 MG/ML
INJECTION INTRAMUSCULAR; INTRAVENOUS PRN
Status: DISCONTINUED | OUTPATIENT
Start: 2024-04-10 | End: 2024-04-10

## 2024-04-10 RX ORDER — ACETAMINOPHEN 325 MG/1
650 TABLET ORAL EVERY 4 HOURS PRN
Status: DISCONTINUED | OUTPATIENT
Start: 2024-04-10 | End: 2024-04-10 | Stop reason: HOSPADM

## 2024-04-10 RX ORDER — FENTANYL CITRATE 50 UG/ML
INJECTION, SOLUTION INTRAMUSCULAR; INTRAVENOUS PRN
Status: DISCONTINUED | OUTPATIENT
Start: 2024-04-10 | End: 2024-04-10

## 2024-04-10 RX ORDER — ONDANSETRON 4 MG/1
4 TABLET, ORALLY DISINTEGRATING ORAL EVERY 30 MIN PRN
Status: DISCONTINUED | OUTPATIENT
Start: 2024-04-10 | End: 2024-04-10 | Stop reason: HOSPADM

## 2024-04-10 RX ORDER — IODIXANOL 320 MG/ML
INJECTION, SOLUTION INTRAVASCULAR
Status: DISCONTINUED | OUTPATIENT
Start: 2024-04-10 | End: 2024-04-10 | Stop reason: HOSPADM

## 2024-04-10 RX ORDER — PROPOFOL 10 MG/ML
INJECTION, EMULSION INTRAVENOUS PRN
Status: DISCONTINUED | OUTPATIENT
Start: 2024-04-10 | End: 2024-04-10

## 2024-04-10 RX ORDER — OXYCODONE HYDROCHLORIDE 5 MG/1
10 TABLET ORAL
Status: DISCONTINUED | OUTPATIENT
Start: 2024-04-10 | End: 2024-04-10 | Stop reason: HOSPADM

## 2024-04-10 RX ORDER — PROTAMINE SULFATE 10 MG/ML
INJECTION, SOLUTION INTRAVENOUS PRN
Status: DISCONTINUED | OUTPATIENT
Start: 2024-04-10 | End: 2024-04-10

## 2024-04-10 RX ORDER — CEFAZOLIN SODIUM/WATER 2 G/20 ML
2 SYRINGE (ML) INTRAVENOUS
Status: DISCONTINUED | OUTPATIENT
Start: 2024-04-10 | End: 2024-04-10 | Stop reason: HOSPADM

## 2024-04-10 RX ORDER — OXYCODONE HYDROCHLORIDE 5 MG/1
5 TABLET ORAL
Status: DISCONTINUED | OUTPATIENT
Start: 2024-04-10 | End: 2024-04-10 | Stop reason: HOSPADM

## 2024-04-10 RX ORDER — LABETALOL HYDROCHLORIDE 5 MG/ML
10 INJECTION, SOLUTION INTRAVENOUS
Status: DISCONTINUED | OUTPATIENT
Start: 2024-04-10 | End: 2024-04-10 | Stop reason: HOSPADM

## 2024-04-10 RX ORDER — HEPARIN SODIUM 1000 [USP'U]/ML
INJECTION, SOLUTION INTRAVENOUS; SUBCUTANEOUS
Status: DISCONTINUED | OUTPATIENT
Start: 2024-04-10 | End: 2024-04-10 | Stop reason: HOSPADM

## 2024-04-10 RX ORDER — LIDOCAINE HYDROCHLORIDE AND EPINEPHRINE 10; 10 MG/ML; UG/ML
INJECTION, SOLUTION INFILTRATION; PERINEURAL
Status: DISCONTINUED | OUTPATIENT
Start: 2024-04-10 | End: 2024-04-10 | Stop reason: HOSPADM

## 2024-04-10 RX ORDER — SODIUM CHLORIDE 9 MG/ML
INJECTION, SOLUTION INTRAVENOUS CONTINUOUS
Status: ACTIVE | OUTPATIENT
Start: 2024-04-10 | End: 2024-04-10

## 2024-04-10 RX ORDER — ONDANSETRON 4 MG/1
4 TABLET, ORALLY DISINTEGRATING ORAL EVERY 6 HOURS PRN
Status: DISCONTINUED | OUTPATIENT
Start: 2024-04-10 | End: 2024-04-10 | Stop reason: HOSPADM

## 2024-04-10 RX ORDER — FENTANYL CITRATE 50 UG/ML
25 INJECTION, SOLUTION INTRAMUSCULAR; INTRAVENOUS EVERY 5 MIN PRN
Status: DISCONTINUED | OUTPATIENT
Start: 2024-04-10 | End: 2024-04-10 | Stop reason: HOSPADM

## 2024-04-10 RX ORDER — LIDOCAINE HYDROCHLORIDE 10 MG/ML
INJECTION, SOLUTION INFILTRATION; PERINEURAL PRN
Status: DISCONTINUED | OUTPATIENT
Start: 2024-04-10 | End: 2024-04-10

## 2024-04-10 RX ADMIN — SODIUM CHLORIDE 500 ML: 9 INJECTION, SOLUTION INTRAVENOUS at 07:30

## 2024-04-10 RX ADMIN — FENTANYL CITRATE 25 MCG: 50 INJECTION INTRAMUSCULAR; INTRAVENOUS at 09:56

## 2024-04-10 RX ADMIN — INSULIN ASPART 2 UNITS: 100 INJECTION, SOLUTION INTRAVENOUS; SUBCUTANEOUS at 12:54

## 2024-04-10 RX ADMIN — LIDOCAINE HYDROCHLORIDE 50 MG: 10 INJECTION, SOLUTION INFILTRATION; PERINEURAL at 09:01

## 2024-04-10 RX ADMIN — SODIUM CHLORIDE: 9 INJECTION, SOLUTION INTRAVENOUS at 08:53

## 2024-04-10 RX ADMIN — PROTAMINE SULFATE 30 MG: 10 INJECTION, SOLUTION INTRAVENOUS at 09:45

## 2024-04-10 RX ADMIN — FENTANYL CITRATE 50 MCG: 50 INJECTION INTRAMUSCULAR; INTRAVENOUS at 09:01

## 2024-04-10 RX ADMIN — SODIUM CHLORIDE: 9 INJECTION, SOLUTION INTRAVENOUS at 09:50

## 2024-04-10 RX ADMIN — ROCURONIUM BROMIDE 50 MG: 50 INJECTION, SOLUTION INTRAVENOUS at 09:03

## 2024-04-10 RX ADMIN — FENTANYL CITRATE 25 MCG: 50 INJECTION INTRAMUSCULAR; INTRAVENOUS at 09:50

## 2024-04-10 RX ADMIN — PHENYLEPHRINE HYDROCHLORIDE 0.3 MCG/KG/MIN: 10 INJECTION INTRAVENOUS at 09:13

## 2024-04-10 RX ADMIN — Medication 2 G: at 09:04

## 2024-04-10 RX ADMIN — ONDANSETRON 4 MG: 2 INJECTION INTRAMUSCULAR; INTRAVENOUS at 09:50

## 2024-04-10 RX ADMIN — INSULIN ASPART 4 UNITS: 100 INJECTION, SOLUTION INTRAVENOUS; SUBCUTANEOUS at 08:11

## 2024-04-10 RX ADMIN — PROPOFOL 110 MG: 10 INJECTION, EMULSION INTRAVENOUS at 09:02

## 2024-04-10 RX ADMIN — ASPIRIN 81 MG: 81 TABLET, COATED ORAL at 07:42

## 2024-04-10 RX ADMIN — SUGAMMADEX 200 MG: 100 INJECTION, SOLUTION INTRAVENOUS at 09:50

## 2024-04-10 ASSESSMENT — ACTIVITIES OF DAILY LIVING (ADL)
ADLS_ACUITY_SCORE: 35

## 2024-04-10 ASSESSMENT — ENCOUNTER SYMPTOMS: DYSRHYTHMIAS: 1

## 2024-04-10 NOTE — ANESTHESIA PROCEDURE NOTES
Airway       Patient location during procedure: OR       Procedure Start/Stop Times: 4/10/2024 9:05 AM  Staff -        CRNA: Rupa Almonte APRN CRNA       Performed By: CRNA  Consent for Airway        Urgency: elective  Indications and Patient Condition       Indications for airway management: jermaine-procedural       Induction type:intravenous       Mask difficulty assessment: 1 - vent by mask    Final Airway Details       Final airway type: endotracheal airway       Successful airway: ETT - single and Oral  Endotracheal Airway Details        ETT size (mm): 7.0       Cuffed: yes       Successful intubation technique: video laryngoscopy       VL Blade Size: Glidescope 3       Grade View of Cords: 1       Adjucts: stylet       Position: Right       Measured from: gums/teeth       Secured at (cm): 20       Bite block used: None    Post intubation assessment        Placement verified by: capnometry, equal breath sounds and chest rise        Number of attempts at approach: 1       Number of other approaches attempted: 0       Secured with: tape       Ease of procedure: easy       Dentition: Intact and Unchanged    Medication(s) Administered   Medication Administration Time: 4/10/2024 9:05 AM

## 2024-04-10 NOTE — DISCHARGE INSTRUCTIONS
Going home after Left Atrial Appendage Occlusion Device Implant    Once Home:  You should arrange for someone to stay with you for the first 24 hours after discharge  Make sure you are taking all of your medications as directed in your discharge instructions.  Do not stop taking these medications without speaking to your provider.   Increase fluid intake for two days    No lifting more than 10 pounds for 5 days  No aggressive exercise for 5 days  No driving x 3 day  You may shower tomorrow; no bath x 5 days  Return to work in 3 days if you have a sedentary job or 5 days if you do manual labor      Care of groin site  It is normal to have a small bruise or lump at the site.  Do not scrub the site.  For the first 2 days: Do not stoop or squat. When you cough, sneeze or move your bowels, hold your hand over the puncture site and press gently.  Do not use lotion or powder near the puncture site for 3 days.  Ok to use ice packs at groin sites 20 minutes at a time for groin discomfort    If you start bleeding from the site in your groin, lie down flat and press firmly on the site. Call your doctor as soon as you can.    Call your doctor if:  You have a large or growing hard lump around the site.  The site is red, swollen, hot or tender.  Blood or fluid is draining from the site.  You have chills or a fever greater than 101 F (38 C).  Your leg or arm feels numb or cool.  You have hives, a rash or unusual itching.    To reduce the risk of infection, avoid dental procedures (including cleanings) for the first 6 weeks.  Contact your cardiology clinic for an antibiotic SHOULD YOU need to see the dentist prior to that 6 week waiting period.    Dial 911 if you have bleeding that is heavy or does not stop OR for any NEW signs/symptoms of a stroke:  Visual disturbance  Problems with talking  Smile only occurs on half your face  Numbness on one side of your face or body  Sudden headache  Confusion  Problems with walking or  balance    Follow up appointments:   6 Week Post Implant Visit Date: May 22 at 10:30 am with Millie Randall PA-C  At [facility]  [address], [address]  [place] [address]  Post-Procedure CTA:  Date: July 11 at 2 pm -  Location: [facility]      Your Procedural Physician was: Dr. Moon     To reach the [facility] nurse coordinators please call:   [phone]        If you have issues tonight when you get home:      Call [phone] and enter your phone number.  Dunia, the PA will call you back.      If after 9 pm, call the [facility] at [phone] and you will be connected to the on call doctor                                                                    If you are calling after hours, please listen to the entire voicemail, a live  will answer at the end of the message

## 2024-04-10 NOTE — PROGRESS NOTES
Patient was kept comfortable during post-procedure stay.VSS. Denies pain. Right femoral access site remains dry & free from signs of bleeding. Tolerated food and fluids. Ambulated without issues. Appointments made & included in AVS. Dr. Moon was able to speak with patient post procedure. Post-op instructions reviewed and packet given to patient & spouse. Able to ask questions. Verbalized no concerns. Belongings returned. Discharged in stable condition.

## 2024-04-10 NOTE — Clinical Note
0 : 23.8. 45 : 20. 90 : 20. 135 : 21. 0 : 32. 45 : 22.7. 90 : 23. 135 : 19. 0 : 19 . 45 : 18 . 90 : 20 . 135 : 19 . JENNIFER type used: BroccoliPosition: Passed. Bard: Passed. Size: Accepted. Seal: Complete. Jet: 0.Deployed.

## 2024-04-10 NOTE — ANESTHESIA PREPROCEDURE EVALUATION
Anesthesia Pre-Procedure Evaluation    Patient: Yvonne Fatima   MRN: [id number] : 1940        Procedure : Procedure(s):  Left Atrial Appendage Closure          Past Medical History:   Diagnosis Date    Atrial fibrillation (H)     [id number] score of 7-on chronic warfarin Previously failed Sotalol and Multaq therapy PVI 3/11 Repeat PVI   Recurrent A fib, most recent CV 2013 Rx sotalol     Atrial flutter (H)     Atypical flutter with RVR after second PVI in 2011      Esophageal reflux     Created by Conversion     Essential hypertension          Status post catheter ablation of atrial fibrillation 6/3/2019    PVI 3/11(PVI/Cryo + CTI line) Repeat PVI  (PVI/RF + CFE + Roof line + GREGORIO line)      Past Surgical History:   Procedure Laterality Date    EP ABLATION PULMONARY VEIN ISOLATION N/A 2023    Procedure: Ablation Atrial Fibrillation;  Surgeon: Timur Corona MD;  Location: [facility] CV    HC REMOVE TONSILS/ADENOIDS,<11 Y/O      Description: Tonsillectomy With Adenoidectomy;  Recorded: 10/17/2012;    HC REVISE MEDIAN N/CARPAL TUNNEL SURG      Description: Neuroplasty Decompression Median Nerve At Carpal Tunnel;  Recorded: 10/17/2012;    SC ABLATE HEART DYSRHYTHM FOCUS      Description: Catheter Ablation Atrial Fibrillation;  Recorded: 2013;  Comments: PVI 3-7-2011 (PVI/cryo + CTI line); ; Repeat PVI 2011 (PVI/RF + CFE + Roof line + GREGORIO line)    SC ABLATE HEART DYSRHYTHM FOCUS      Description: Catheter Ablation Atrial Fibrillation;  Recorded: 2014;  Comments: PVI 3-7-2011 (PVI/cryo + CTI line); ; Repeat PVI 2011 (PVI/RF + CFE + Roof line + GREGORIO line)    SC CARDIOVERSION ELECTIVE ARRHYTHMIA EXTERNAL       Fib 10/17/12; ; Flutter 2/15/13; AFl 2019      Allergies   Allergen Reactions    Pregabalin Other (See Comments)     Foggy thinking    Ace Inhibitors Cough    Empagliflozin Other (See Comments)     weakness    Irbesartan Other (See Comments)      exhaustion    Metformin GI Disturbance     .  Retry 2016- gastrointestinal intolerance again    Pioglitazone Other (See Comments)     Hand leg and facial swelling      Social History     Tobacco Use    Smoking status: Former     Current packs/day: 0.00     Types: Cigarettes     Quit date: 1962     Years since quittin.0    Smokeless tobacco: Never   Substance Use Topics    Alcohol use: Yes     Comment: Alcoholic Drinks/day: 6 per month      Wt Readings from Last 1 Encounters:   24 65.8 kg (145 lb)        Anesthesia Evaluation   Pt has had prior anesthetic.         ROS/MED HX  ENT/Pulmonary:     (+) sleep apnea,                                       Neurologic:    (-) no CVA and no TIA   Cardiovascular: Comment: Interpretation Summary     The left ventricle is normal in size.  Left ventricular function is normal.The ejection fraction is 60-65%.  Left ventricular diastolic function is abnormal.  The right ventricle is mildly dilated.  The left atrium is moderately dilated. The right atrium is moderately dilated.  There is severe mitral annular calcification.  There is mild mitral stenosis.  There is moderate (2+) tricuspid regurgitation.  Right ventricular systolic pressure is elevated, consistent with mild  pulmonary hypertension.      (+)  hypertension- -   -  - -   Taking blood thinners                     dysrhythmias, a-fib,  valvular problems/murmurs  Mild MS.         METS/Exercise Tolerance:     Hematologic:       Musculoskeletal:       GI/Hepatic:     (+) GERD,                   Renal/Genitourinary:     (+) renal disease, type: CRI,            Endo:     (+)  type II DM,   Using insulin,          Obesity,       Psychiatric/Substance Use:       Infectious Disease:       Malignancy:       Other:            Physical Exam    Airway        Mallampati: II   TM distance: > 3 FB   Neck ROM: full     Respiratory Devices and Support         Dental       (+) Minor Abnormalities - some fillings, tiny  "chips      Cardiovascular          Rhythm and rate: regular     Pulmonary           breath sounds clear to auscultation           OUTSIDE LABS:  CBC:   Lab Results   Component Value Date    WBC 8.6 04/08/2024    WBC 9.2 09/06/2023    HGB 12.6 04/08/2024    HGB 13.1 09/06/2023    HCT 38.9 04/08/2024    HCT 38.9 09/06/2023     04/08/2024     09/06/2023     BMP:   Lab Results   Component Value Date     04/08/2024     (L) 09/06/2023    POTASSIUM 4.3 04/08/2024    POTASSIUM 4.0 09/06/2023    CHLORIDE 101 04/08/2024    CHLORIDE 99 09/06/2023    CO2 22 04/08/2024    CO2 21 (L) 09/06/2023    BUN 23.2 (H) 04/08/2024    BUN 20.2 09/06/2023    CR 1.00 (H) 04/08/2024    CR 0.89 09/06/2023     (H) 04/08/2024     (H) 09/06/2023     COAGS:   Lab Results   Component Value Date    INR 2.87 (H) 04/08/2024     POC: No results found for: \"BGM\", \"HCG\", \"HCGS\"  HEPATIC: No results found for: \"ALBUMIN\", \"PROTTOTAL\", \"ALT\", \"AST\", \"GGT\", \"ALKPHOS\", \"BILITOTAL\", \"BILIDIRECT\", \"ABIMAEL\"  OTHER:   Lab Results   Component Value Date    HOLLIS 9.8 04/08/2024       Anesthesia Plan    ASA Status:  3    NPO Status:  NPO Appropriate    Anesthesia Type: General.     - Airway: ETT   Induction: Intravenous.   Maintenance: Inhalation.   Techniques and Equipment:     - Lines/Monitors: 2nd IV     - Drips/Meds: Phenylephrine     Consents    Anesthesia Plan(s) and associated risks, benefits, and realistic alternatives discussed. Questions answered and patient/representative(s) expressed understanding.     - Discussed: Risks, Benefits and Alternatives for BOTH SEDATION and the PROCEDURE were discussed     - Discussed with:             Postoperative Care    Pain management: IV analgesics, Oral pain medications.   PONV prophylaxis: Ondansetron (or other 5HT-3), Dexamethasone or Solumedrol     Comments:               Emy Muhammad MD    I have reviewed the pertinent notes and labs in the chart from the past 30 days and " "(re)examined the patient.  Any updates or changes from those notes are reflected in this note.            # Coagulation Defect: INR = 2.87 (Ref range: 0.85 - 1.15) and/or PTT = N/A, will monitor for bleeding   # Overweight: Estimated body mass index is 27.4 kg/m  as calculated from the following:    Height as of 4/8/24: 1.549 m (5' 1\").    Weight as of 4/8/24: 65.8 kg (145 lb).      "

## 2024-04-10 NOTE — INTERVAL H&P NOTE
"I have reviewed the surgical (or preoperative) H&P that is linked to this encounter, and examined the patient. There are no significant changes    INR 2.69 today    Clinical Conditions Present on Arrival:  Clinically Significant Risk Factors Present on Admission     # Drug Induced Coagulation Defect: home medication list includes an anticoagulant medication  # Drug Induced Platelet Defect: home medication list includes an antiplatelet medication  # Overweight: Estimated body mass index is 27.06 kg/m  as calculated from the following:    Height as of this encounter: 1.549 m (5' 1\").    Weight as of this encounter: 65 kg (143 lb 3.2 oz).           "

## 2024-04-10 NOTE — ANESTHESIA CARE TRANSFER NOTE
Patient: Yvonne Fatima    Procedure: Procedure(s):  Left Atrial Appendage Closure       Diagnosis: other  Diagnosis Additional Information: No value filed.    Anesthesia Type:   General     Note:    Oropharynx: spontaneously breathing  Level of Consciousness: awake  Oxygen Supplementation: room air    Independent Airway: airway patency satisfactory and stable  Dentition: dentition unchanged  Vital Signs Stable: post-procedure vital signs reviewed and stable  Report to RN Given: handoff report given  Patient transferred to: Cardiac Special Care          Vitals:  Vitals Value Taken Time   /63 04/10/24 1004   Temp 36.7  C (98  F) 04/10/24 1004   Pulse 83 04/10/24 1004   Resp 17 04/10/24 1004   SpO2 95 % 04/10/24 1004   Vitals shown include unfiled device data.    Electronically Signed By: TEODORO Posey CRNA  April 10, 2024  10:05 AM

## 2024-04-10 NOTE — DISCHARGE SUMMARY
HEART CARE INPATIENT ENCOUNTER NOTE      Structural Discharge Summary    Primary Care Physician:  Juan Mcmanus    Discharge Provider: SARAH GOODRICH PA-C     Admission Date: 4/10/2024. Admission Diagnoses: Persistent atrial fibrillation (H) [I48.19]   Discharge Date: April 10, 2024   Disposition: Home   Condition at Discharge: Stable  Code Status: Full Code     Principal Diagnosis:  Persistent atrial fibrillation    Discharge Diagnoses:  Active Problems:    Essential hypertension    SHAVON on CPAP    Status post catheter ablation of atrial fibrillation    Chronic kidney disease, stage 3a (H)    Persistent atrial fibrillation (H)    Presence of Watchman left atrial appendage closure device      Consult/s: none  Significant Diagnostic Studies:   Procedural HALI - Interpretation Summary     Structural HALI for Left Atrial Appendage Occlusion Device     Pre-Device:  1. Normal left ventricular size and systolic function. LVEF: 60-65%  2. No left atrial thrombus or spontaneous contrast. Severe left atrial  enlargement.  3. No ASD or PFO by color flow.  4. No pericardial effusion.     Post Device:  1. Well-seated PINNACLE FLXÂ  Device in left atrial appendage by 2D and 3D  imaging. No color doppler evidence of flow around device at ostium insertion  before or after device release. No change in mitral valve function. No  thrombus noted on device, LA or JENNIFER.     JENNIFER device measurements:  Device compression diameter:  Pre-deployment.  0Â : 23.8 mm  45Â : 20 mm  90Â : 20 mm  135 Â : 20.6 mm     Post-deployment  0Â : 19 mm  45Â : 17.4 mm  90Â : 20 mm  135 Â : 19 mm     2. Normal left ventricular size and systolic function. LVEF: 60-65%  3. Small ASD with unidirectional flow (left to right) by color flow doppler.  4. No post procedural pericardial effusion.         Treatments: procedures: LAAO implant (Watchman FLX)    Discharge Medications:   Discharge Medication List as of 4/10/2024 12:19 PM        CONTINUE these  medications which have NOT CHANGED    Details   allopurinol (ZYLOPRIM) 100 MG tablet [ALLOPURINOL (ZYLOPRIM) 100 MG TABLET] Take 100 mg by mouth 2 (two) times a day.       , Historical      aspirin 81 MG EC tablet Take 1 tablet (81 mg) by mouth daily, OTC      atorvastatin (LIPITOR) 40 MG tablet Take 1 tablet by mouth daily, Historical      BD SUELLEN U/F 32G X 4 MM insulin pen needle DAWHistorical      gabapentin (NEURONTIN) 100 MG capsule Take 100 mg by mouth 2 times daily, Historical      glipiZIDE (GLUCOTROL XL) 10 MG 24 hr tablet Take 2 tablets by mouth daily, Historical      insulin degludec (TRESIBA FLEXTOUCH) 100 UNIT/ML pen Inject 20 Units Subcutaneous, Historical      insulin glargine (LANTUS PEN) 100 UNIT/ML pen Inject 52 Units Subcutaneous every morning, Historical      Lancets (ONETOUCH DELICA PLUS [id number]) MISC Test once daily varying time,, Historical      ONETOUCH VERIO IQ test strip TYLER, Historical      potassium chloride (KLOR-CON) 10 MEQ CR tablet [POTASSIUM CHLORIDE (KLOR-CON) 10 MEQ CR TABLET] Take 20 mEq by mouth 2 (two) times a day., Historical      spironolactone-hydrochlorothiazide (ALDACTAZIDE) 25-25 mg tablet Take by mouth daily Patient takes one-half tablet daily., Historical      warfarin (COUMADIN) 2.5 MG tablet [WARFARIN (COUMADIN) 2.5 MG TABLET] Adjust dose based on INR results as directed., Historical             Discharge Instructions:  Follow up appointment with Millie Randall PA-C in 45 days (May 22)    Follow up CTA in ~3 months (July 11)    Diet: Regular diet. Increase fluids over the next 2 days.   Activity: Activity as tolerated   Restrictions: No lifting >10 lbs or vigorous exercise for 5 days. No driving for 3 days. May return to work in 5 days  Wound / drain care: OK to shower next day. Keep wound clean and dry. Ok to use Ice packs PRN for discomfort. No baths, hot tubs or swimming pools for 5 days.    Hospital Summary:   Ms. Yvonne Fatima is a 84 year old female who  "underwent successful LAAO implant with a 27 mm Watchman FLX device. [place] [facility] [place] for 4 hours.  The patient then dangled at the edge of bed and ambulated; she voided without difficulty.  The vascular access site remained stable prior to discharge.  Post procedure the patient denied chest pain/pressure, palpitations, or shortness of breath.      Repeat labs were reviewed and were stable.  Activity restrictions and reportable signs and symptoms were discussed with the patient who verbalizes understanding.  She will continue taking ASA and warfarin daily for the next 6 weeks.  After 6 weeks, she will come off the warfarin and take DAPT (ASA/Plavix) through October 10. At that time she will be on single antiplatelet therapy indefinitely.     Follow up is arranged as above.        Physical Examination   /60   Pulse 84   Temp 98  F (36.7  C) (Oral)   Resp 16   Ht 1.549 m (5' 1\")   Wt 65 kg (143 lb 3.2 oz)   SpO2 95%   BMI 27.06 kg/m    Body mass index is 27.06 kg/m .  Wt Readings from Last 3 Encounters:   04/10/24 65 kg (143 lb 3.2 oz)   04/08/24 65.8 kg (145 lb)   02/08/24 65.6 kg (144 lb 11.2 oz)       Intake/Output Summary (Last 24 hours) at 4/10/2024 1005  Last data filed at 4/10/2024 0950  Gross per 24 hour   Intake 1000 ml   Output --   Net 1000 ml     General Appearance:   no distress, normal body habitus   Chest/Lungs:   Normal respiratory effort. Respirations are even and unlabored. Lungs are clear to auscultation, no rales or wheezing. No chest wall tenderness.    Cardiovascular:   Regular. Normal S1, S2 with no murmurs, rubs, or gallops; the carotid, radial, dorsalis pedis and posterior tibial pulses are intact   Abdomen:  soft, non-tender to palpation, non-distended. + bowel sounds.   Extremities: No edema    Skin: Right groin site WNL   Neurologic: Alert and oriented x3, calm and able to move all 4 extremities appropriately            Lab Results    Chemistry/lipid " CBC    Creat 4/10/2024 - 0.84 Hgb 4/10/2024 - 12.3

## 2024-04-10 NOTE — ANESTHESIA POSTPROCEDURE EVALUATION
Mode with a short course of prednisone, 15 mg daily for 1 week, then 10 mg daily for 1 week, then 5 mg daily for 1 week   Patient: Yvonne Fatima    Procedure: Procedure(s):  Left Atrial Appendage Closure       Anesthesia Type:  General    Note:  Disposition: Outpatient   Postop Pain Control: Uneventful            Sign Out: Well controlled pain   PONV: No   Neuro/Psych: Uneventful            Sign Out: Acceptable/Baseline neuro status   Airway/Respiratory: Uneventful            Sign Out: Acceptable/Baseline resp. status   CV/Hemodynamics: Uneventful            Sign Out: Acceptable CV status; No obvious hypovolemia; No obvious fluid overload   Other NRE: NONE   DID A NON-ROUTINE EVENT OCCUR? No           Last vitals:  Vitals Value Taken Time   /63 04/10/24 1050   Temp 36.7  C (98  F) 04/10/24 1004   Pulse 82 04/10/24 1054   Resp 13 04/10/24 1054   SpO2 93 % 04/10/24 1054   Vitals shown include unfiled device data.    Electronically Signed By: Emy Muhammad MD  April 10, 2024  2:08 PM

## 2024-04-11 ENCOUNTER — VIRTUAL VISIT (OUTPATIENT)
Dept: CARDIOLOGY | Facility: CLINIC | Age: 84
End: 2024-04-11
Payer: COMMERCIAL

## 2024-04-11 DIAGNOSIS — Z95.818 PRESENCE OF WATCHMAN LEFT ATRIAL APPENDAGE CLOSURE DEVICE: Primary | ICD-10-CM

## 2024-04-11 PROCEDURE — 99207 PR NO CHARGE NURSE ONLY: CPT | Mod: 93

## 2024-04-11 NOTE — PROGRESS NOTES
Phone call to patient for post op 27mm Watchman Flx device placement with Dr. Moon on 4/10/24.    Pt VSS, LSC, denies SOB, and palpable pedal pulses.    No peripheral edema noted, no abdominal bloating or discomfort.     Pt denies any neurological changes at this time.    Pt denies generalized or localized pain at this time.   Pt is having bowel movements and is voiding without difficulty.      Patient instructed to remove bandage from Right groin site. Pt right groin has 1 puncture site, is C/D/I, no drainage, slight bruising noted around puncture site, groin is soft, and pt denies pain at the site.      Pt continues anticoagulation medications: Warfarin/Coumadin and daily 81 mg Aspirin, per post-LAAC Watchman protocol.    Discussed below information via phone.     Your anticoagulation medication (Warfarin/Coumadin):    It is important to remain on your anticoagulation medication uninterrupted after your Watchman device implant to reduce your risk of a stroke or heart attack, DO NOT STOP THIS MEDICATION.  If you had a Watchman Device placed, please have your INR checked weekly for the first 4 weeks following your procedure.  You will remain on once daily 81 mg Aspirin for the remainder of your life  You will continue your anticoagulation medication until you see the Structural HEAVEN in clinic to discuss further medication changes.    Healing from your left atrial closure device implant:    Stay well hydrated, it is important to increase your fluid intake during your recovery period.  Eat foods high in protein to help the healing process.  Gradually increase your activity over the several days, back to baseline;  No aggressive exercise for 5 days post-procedure.  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  No lifting more that 10 lb for 5 days after procedure.  No driving for 3 days post-procedure.   Keep your incision site clean, dry and open to air.  Ok to use ice  packs at groin sites for 20 minutes at a time for groin discomfort.   Please delay any dental procedures until 6 weeks post implant. You will not require anti-biotic prophylaxis treatment after this time frame.  If you need urgent dental care prior to the 6 week post-procedure restriction, please contact your cardiologist for prophylactic antibiotic.     Please call me if any of the following occur:    Shortness of breath, dizziness, or chest pain.  Changes in your groin sites including:  Swelling, hardening, drainage, increase in bruising or an increase in pain.          Dial 911 for signs/symptoms of a stroke:    New onset visual disturbance.  New problems with talking/speech.  Smile only occurs on half your face.  Numbness on one side of your body or face.  Sudden headache.  New onset of confusion.  New problems with walking or balance.     Important information regarding your future follow up appointments:    45 Day post-implant office visit with our Structural Advance Practice Practitioner (HEAVEN)  3 month post-implant CT to assess your Watchman Device.  You will be contacted after your CT is complete to discuss results within 7-10 days days by a Structural RN Coordinator.  6 month in clinic office visit with our Structural HEAVEN  1 year post-implant imaging study to assess your Watchman Device  1 year post-implant office visit with our Structural HEAVEN  2 year post-implant office visit with our Structural HEAVEN    Reviewed post op LAAC healing process, f/u appts, physical restrictions, nutritional requirements, when to contact the heart clinic, and contact information was given to the pt for further concerns or questions.  Pt verbalized understanding.   Thank you,    Kya Vela, Structural Heart Coordinator -[phone]    After hours please contact the on call service at [phone]

## 2024-04-12 ENCOUNTER — TELEPHONE (OUTPATIENT)
Dept: CARDIOLOGY | Facility: CLINIC | Age: 84
End: 2024-04-12
Payer: COMMERCIAL

## 2024-04-12 NOTE — TELEPHONE ENCOUNTER
Call placed to patients INR clinic at . Spoke to nurse and updated that LAAC was placed on 4/10 and INR checks should be completed weekly, she will remain on Warfarin until 5/22 then will switch to plavix. She had no further questions and would set up INR checks with patient. -SC

## 2024-05-22 ENCOUNTER — OFFICE VISIT (OUTPATIENT)
Dept: CARDIOLOGY | Facility: CLINIC | Age: 84
End: 2024-05-22
Payer: COMMERCIAL

## 2024-05-22 VITALS
WEIGHT: 143 LBS | BODY MASS INDEX: 27.02 KG/M2 | DIASTOLIC BLOOD PRESSURE: 60 MMHG | RESPIRATION RATE: 16 BRPM | SYSTOLIC BLOOD PRESSURE: 124 MMHG | HEART RATE: 79 BPM

## 2024-05-22 DIAGNOSIS — Z95.818 PRESENCE OF WATCHMAN LEFT ATRIAL APPENDAGE CLOSURE DEVICE: Primary | ICD-10-CM

## 2024-05-22 DIAGNOSIS — I10 ESSENTIAL HYPERTENSION: ICD-10-CM

## 2024-05-22 DIAGNOSIS — I48.19 PERSISTENT ATRIAL FIBRILLATION (H): ICD-10-CM

## 2024-05-22 DIAGNOSIS — E78.5 DYSLIPIDEMIA: ICD-10-CM

## 2024-05-22 PROCEDURE — 99213 OFFICE O/P EST LOW 20 MIN: CPT | Performed by: PHYSICIAN ASSISTANT

## 2024-05-22 RX ORDER — TELMISARTAN 40 MG/1
40 TABLET ORAL DAILY
COMMUNITY
Start: 2024-05-06 | End: 2024-05-22

## 2024-05-22 RX ORDER — METFORMIN HCL 500 MG
500 TABLET, EXTENDED RELEASE 24 HR ORAL DAILY
COMMUNITY
Start: 2024-05-06 | End: 2025-05-06

## 2024-05-22 RX ORDER — PREGABALIN 75 MG/1
75 CAPSULE ORAL
COMMUNITY
Start: 2024-05-06 | End: 2025-05-06

## 2024-05-22 RX ORDER — INSULIN ASPART 100 [IU]/ML
INJECTION, SOLUTION INTRAVENOUS; SUBCUTANEOUS
COMMUNITY
Start: 2024-04-09

## 2024-05-22 RX ORDER — CLOPIDOGREL BISULFATE 75 MG/1
75 TABLET ORAL DAILY
Qty: 70 TABLET | Refills: 1 | Status: SHIPPED | OUTPATIENT
Start: 2024-05-23 | End: 2024-10-10

## 2024-05-22 NOTE — PATIENT INSTRUCTIONS
Yvonne Fatima,    It was a pleasure to see you today in the clinic regarding your Watchman follow-up.     My recommendations after this visit include:     - stop taking your warfarin. Please take aspirin 81 mg daily indefinitely. Start taking Plavix 75 mg daily tomorrow until 6 months post-procedure (October 10)   - CT scan on July 11    - no medication changes, continue taking your spironolactone-hydrochlorothiazide pill      If you have questions or concerns, please call using the numbers below:      After Hours/Scheduling  [phone]    Otherwise you can dial the nurse directly at:                 Vandana Mohr RN    [phone]    Millie Randall PA-C  Structural Heart Program  [facility]

## 2024-05-22 NOTE — LETTER
5/22/2024    Rossy Yoon MD  [facility] [address]  [facility] [address]    RE: Yvonne Fatima       Dear Colleague,     I had the pleasure of seeing Yvonne Fatima in the [facility].  HEART CARE ENCOUNTER NOTE       [facility]  [phone]      Assessment/Recommendations   1.  Persistent atrial fibrillation/atrial flutter: status post LAAO with 27 mm Watchman FLX device on 4/10/2024.  She is currently taking warfarin.  She has not been taking a baby aspirin.  Now that she is 6 weeks out from the procedure she will stop warfarin and switch to DAPT Plavix 75 mg daily starting tomorrow along with aspirin 81 mg daily.  She will start the aspirin 81 mg today.  She will remain on DAPT until 6 months postprocedure.  She will then stop Plavix and remain on aspirin 81 mg daily indefinitely.  Her follow-up imaging is scheduled for July 11.    MODIFIED CHARLOTTE SCALE   Timepoint: 4-6 wk Post-LAAC    Previous score: 0    Score Description   0 No symptoms at all   1 No significant disability despite symptoms; able to carry out all usual duties and activities   2 Slight disability; unable to carry out all previous activities, but able to look after own affairs without assistance   3 Moderate disability; requiring some help, but able to walk without assistance   4 Moderately severe disability; unable to walk without assistance and unable to attend to own bodily needs without assistance   5 Severe disability; bedridden, incontinent and requiring constant nursing care and attention   6 Dead    Total score (0 - 6):  0    Change in score if s/p LAAC? No    2.  Hypertension -blood pressure is Controlled. Patient is not currently taking telmisartan. Continue spironolactone-hydrochlorothiazide     3.  Dyslipidemia - last LDL 51 (December 2023). Continue Lipitor 40 mg daily     4. History of TIA - ~10-15 years ago    The longitudinal plan of care for the  diagnosis(es)/condition(s) as documented were addressed during this visit. Due to the added complexity in care, we will continue to support Ellyn in the subsequent management and with ongoing continuity of care.        History of Present Illness/Subjective    Yvonne Fatima is a 84 year old female who comes in today for 6-week follow-up after watchman implant    Yvonne Fatima has a past history of persistent atrial fibrillation, atrial flutter, hypertension, dyslipidemia, chronic kidney disease stage III, type 2 diabetes mellitus, obstructive sleep apnea, history of TIA.    She denies stroke or strokelike symptoms since watchman implant.  She has not been taking a baby aspirin as she reports someone told her to stop taking this after the procedure.  She is currently taking warfarin.    Yvonne Fatima denies chest discomfort, palpitations, shortness of breath, paroxysmal nocturnal dyspnea, orthopnea, lightheadedness, dizziness, pre-syncope, or syncope.  Yvonne Fatima also denies any weight loss, changes in appetite, nausea or vomiting.     Medical, surgical, family, social history, and medications were reviewed and updated as necessary.    ECHO results (from 4/10/2024):  Interpretation Summary     Structural HALI for Left Atrial Appendage Occlusion Device     Pre-Device:  1. Normal left ventricular size and systolic function. LVEF: 60-65%  2. No left atrial thrombus or spontaneous contrast. Severe left atrial  enlargement.  3. No ASD or PFO by color flow.  4. No pericardial effusion.     Post Device:  1. Well-seated PINNACLE FLXÂ  Device in left atrial appendage by 2D and 3D  imaging. No color doppler evidence of flow around device at ostium insertion  before or after device release. No change in mitral valve function. No  thrombus noted on device, LA or JENNIFER.     JENNIFER device measurements:  Device compression diameter:  Pre-deployment.  0Â : 23.8 mm  45Â : 20 mm  90Â : 20 mm  135 Â : 20.6 mm      Post-deployment  0Â : 19 mm  45Â : 17.4 mm  90Â : 20 mm  135 Â : 19 mm     2. Normal left ventricular size and systolic function. LVEF: 60-65%  3. Small ASD with unidirectional flow (left to right) by color flow doppler.  4. No post procedural pericardial effusion.    EKG (personally reviewed and interpreted): 4/8/2024  Sinus with PACs     Physical Examination Review of Systems   Vitals: /60 (BP Location: Left arm, Patient Position: Sitting, Cuff Size: Adult Large)   Pulse 79   Resp 16   Wt 64.9 kg (143 lb)   BMI 27.02 kg/m    BMI= Body mass index is 27.02 kg/m .  Wt Readings from Last 3 Encounters:   05/22/24 64.9 kg (143 lb)   04/10/24 65 kg (143 lb 3.2 oz)   04/08/24 65.8 kg (145 lb)       General Appearance:   Alert, cooperative and in no acute distress   ENT/Mouth: membranes moist, no oral lesions or bleeding gums.      EYES:  no scleral icterus, normal conjunctivae   Neck: Thyroid not visualized   Chest/Lungs:   lungs are clear to auscultation, no rales or wheezing   Cardiovascular:   Regular . Normal first and second heart sounds with no murmurs, rubs or gallops; the carotid, radial and posterior tibial pulses are intact, no edema bilaterally    Abdomen:  Soft and nontender. Bowel sounds are present in all quadrants   Extremities: no cyanosis or clubbing   Skin: no xanthelasma, warm.    Neurologic: normal gait, normal  bilateral, no tremors   Psychiatric: Normal mood and affect       Please refer above for cardiac ROS details.      Medical History  Surgical History Family History Social History   Past Medical History:   Diagnosis Date    Atrial fibrillation (H)     [id number] score of 7-on chronic warfarin Previously failed Sotalol and Multaq therapy PVI 3/11 Repeat PVI 7/11  Recurrent A fib, most recent CV Feb 2013 Rx sotalol     Atrial flutter (H)     Atypical flutter with RVR after second PVI in July 2011      Esophageal reflux     Created by Conversion     Essential hypertension           Status post catheter ablation of atrial fibrillation 6/3/2019    PVI 3/11(PVI/Cryo + CTI line) Repeat PVI  (PVI/RF + CFE + Roof line + GREGORIO line)     Past Surgical History:   Procedure Laterality Date    CV LEFT ATRIAL APPENDAGE CLOSURE Right 4/10/2024    Procedure: Left Atrial Appendage Closure;  Surgeon: Danyel Moon MD;  Location: [facility] CATH LAB CV    EP ABLATION PULMONARY VEIN ISOLATION N/A 2023    Procedure: Ablation Atrial Fibrillation;  Surgeon: Timur Corona MD;  Location: [facility] LAB CV    HC REMOVE TONSILS/ADENOIDS,<13 Y/O      Description: Tonsillectomy With Adenoidectomy;  Recorded: 10/17/2012;    HC REVISE MEDIAN N/CARPAL TUNNEL SURG      Description: Neuroplasty Decompression Median Nerve At Carpal Tunnel;  Recorded: 10/17/2012;    OR ABLATE HEART DYSRHYTHM FOCUS      Description: Catheter Ablation Atrial Fibrillation;  Recorded: 2013;  Comments: PVI 3-7-2011 (PVI/cryo + CTI line); ; Repeat PVI 2011 (PVI/RF + CFE + Roof line + GREGORIO line)    OR ABLATE HEART DYSRHYTHM FOCUS      Description: Catheter Ablation Atrial Fibrillation;  Recorded: 2014;  Comments: PVI 3-7-2011 (PVI/cryo + CTI line); ; Repeat PVI 2011 (PVI/RF + CFE + Roof line + GREGORIO line)    OR CARDIOVERSION ELECTIVE ARRHYTHMIA EXTERNAL       Fib 10/17/12; ; Flutter 2/15/13; AFl 2019     Family History   Problem Relation Age of Onset    Aneurysm Mother         brain    Emphysema Father     Lung Cancer Sister     Hypertension Brother     Lung Cancer Sister     Lung Cancer Sister     Breast Cancer Sister     Social History     Socioeconomic History    Marital status:      Spouse name: Not on file    Number of children: 3    Years of education: Not on file    Highest education level: Not on file   Occupational History    Not on file   Tobacco Use    Smoking status: Former     Current packs/day: 0.00     Types: Cigarettes     Quit date: 1962     Years since quittin.1    Smokeless  tobacco: Never   Vaping Use    Vaping status: Never Used   Substance and Sexual Activity    Alcohol use: Yes     Comment: Alcoholic Drinks/day: 6 per month    Drug use: No    Sexual activity: Yes     Partners: Male   Other Topics Concern    Not on file   Social History Narrative    Not on file     Social Determinants of Health     Financial Resource Strain: Not on file   Food Insecurity: Not on file   Transportation Needs: Not on file   Physical Activity: Not on file   Stress: Not on file   Social Connections: Not on file   Interpersonal Safety: Not on file   Housing Stability: Not on file          Medications  Allergies   Current Outpatient Medications   Medication Sig Dispense Refill    allopurinol (ZYLOPRIM) 100 MG tablet [ALLOPURINOL (ZYLOPRIM) 100 MG TABLET] Take 100 mg by mouth 2 (two) times a day.             atorvastatin (LIPITOR) 40 MG tablet Take 1 tablet by mouth daily      BD SUELLEN U/F 32G X 4 MM insulin pen needle       cholecalciferol 50 MCG (2000 UT) CAPS Take 2,000 Units by mouth 3 times daily      [START ON 5/23/2024] clopidogrel (PLAVIX) 75 MG tablet Take 1 tablet (75 mg) by mouth daily for 140 days 70 tablet 1    insulin degludec (TRESIBA FLEXTOUCH) 100 UNIT/ML pen Inject 20 Units Subcutaneous      insulin glargine (LANTUS PEN) 100 UNIT/ML pen Inject 52 Units Subcutaneous every morning      Lancets (ONETOUCH DELICA PLUS [id number]) MISC Test once daily varying time,      metFORMIN (GLUCOPHAGE XR) 500 MG 24 hr tablet Take 500 mg by mouth daily      NOVOLOG FLEXPEN 100 UNIT/ML soln INJECT THREE TIMES DAILY BEFORE MEALS: 1 UNIT IF GLUCOSE -170, 2 UNITS -200, 3 UNITS -230, 4 UNITS -260, 5 UNITS -290, 6 UNITS IF >290, MAX DOSE OF 18 UNITS DAILY.*      ONETOUCH VERIO IQ test strip       potassium chloride (KLOR-CON) 10 MEQ CR tablet [POTASSIUM CHLORIDE (KLOR-CON) 10 MEQ CR TABLET] Take 20 mEq by mouth 2 (two) times a day.      pregabalin (LYRICA) 75 MG capsule Take 75 mg by  "mouth      semaglutide (OZEMPIC) 2 MG/3ML pen Inject 0.5 mg Subcutaneous      telmisartan (MICARDIS) 40 MG tablet Take 40 mg by mouth daily      aspirin 81 MG EC tablet Take 1 tablet (81 mg) by mouth daily (Patient not taking: Reported on 5/22/2024)      spironolactone-hydrochlorothiazide (ALDACTAZIDE) 25-25 mg tablet Take by mouth daily Patient takes one-half tablet daily. (Patient not taking: Reported on 5/22/2024)      Allergies   Allergen Reactions    Pregabalin Other (See Comments)     Foggy thinking    Ace Inhibitors Cough    Empagliflozin Other (See Comments)     weakness    Irbesartan Other (See Comments)     exhaustion    Metformin GI Disturbance     2013.  Retry 2016- gastrointestinal intolerance again    Pioglitazone Other (See Comments)     Hand leg and facial swelling         Lab Results    Chemistry/lipid CBC Cardiac Enzymes/BNP/TSH/INR   No results for input(s): \"CHOL\", \"HDL\", \"LDL\", \"TRIG\", \"CHOLHDLRATIO\" in the last 48213 hours.  No results for input(s): \"LDL\" in the last 00464 hours.  Recent Labs   Lab Test 04/10/24  1253 04/10/24  1020 04/10/24  1016 04/10/24  0716 04/08/24  1601   NA  --   --   --   --  137   POTASSIUM  --   --   --   --  4.3   CHLORIDE  --   --   --   --  101   CO2  --   --   --   --  22   *   < >  --    < > 332*   BUN  --   --   --   --  23.2*   CR  --   --  0.84  --  1.00*   GFRESTIMATED  --   --  68  --  55*   HOLLIS  --   --   --   --  9.8    < > = values in this interval not displayed.     Recent Labs   Lab Test 04/10/24  1016 04/08/24  1601 09/06/23  1028   CR 0.84 1.00* 0.89     No results for input(s): \"A1C\" in the last 76934 hours. Recent Labs   Lab Test 04/10/24  1016 04/08/24  1601   WBC  --  8.6   HGB 12.3 12.6   HCT  --  38.9   MCV  --  89   PLT  --  256     Recent Labs   Lab Test 04/10/24  1016 04/08/24  1601 09/06/23  1028   HGB 12.3 12.6 13.1    No results for input(s): \"TROPONINI\" in the last 90803 hours.  No results for input(s): \"BNP\", \"NTBNPI\", \"NTBNP\" in " "the last 92691 hours.  No results for input(s): \"TSH\" in the last 03988 hours.  Recent Labs   Lab Test 04/10/24  0716 04/08/24  1601 09/15/23  0000   INR 2.69* 2.87* 2.6*        25 minutes spent on the date of encounter doing education, chart prep/review, review of outside records, review of test results, interpretation with above tests, patient visit, and documentation.      This note has been dictated using voice recognition software. Any grammatical or context distortions are unintentional and inherent to the software.    Millie Randall PA-C  Structural Heart Program  [facility]       Thank you for allowing me to participate in the care of your patient.      Sincerely,     Millie Randall PA-C     [facility]  cc:   Millie Randall PA-C  [facility]  [address], [address]  [place] [address]      "

## 2024-05-22 NOTE — PROGRESS NOTES
HEART CARE ENCOUNTER NOTE       [facility]  [phone]      Assessment/Recommendations   1.  Persistent atrial fibrillation/atrial flutter: status post LAAO with 27 mm Watchman FLX device on 4/10/2024.  She is currently taking warfarin.  She has not been taking a baby aspirin.  Now that she is 6 weeks out from the procedure she will stop warfarin and switch to DAPT Plavix 75 mg daily starting tomorrow along with aspirin 81 mg daily.  She will start the aspirin 81 mg today.  She will remain on DAPT until 6 months postprocedure.  She will then stop Plavix and remain on aspirin 81 mg daily indefinitely.  Her follow-up imaging is scheduled for July 11.    MODIFIED CHARLOTTE SCALE   Timepoint: 4-6 wk Post-LAAC    Previous score: 0    Score Description   0 No symptoms at all   1 No significant disability despite symptoms; able to carry out all usual duties and activities   2 Slight disability; unable to carry out all previous activities, but able to look after own affairs without assistance   3 Moderate disability; requiring some help, but able to walk without assistance   4 Moderately severe disability; unable to walk without assistance and unable to attend to own bodily needs without assistance   5 Severe disability; bedridden, incontinent and requiring constant nursing care and attention   6 Dead    Total score (0 - 6):  0    Change in score if s/p LAAC? No    2.  Hypertension -blood pressure is Controlled. Patient is not currently taking telmisartan. Continue spironolactone-hydrochlorothiazide     3.  Dyslipidemia - last LDL 51 (December 2023). Continue Lipitor 40 mg daily     4. History of TIA - ~10-15 years ago    The longitudinal plan of care for the diagnosis(es)/condition(s) as documented were addressed during this visit. Due to the added complexity in care, we will continue to support Ellyn in the subsequent management and with ongoing continuity of care.        History of Present  Illness/Subjective    Yvonne Fatima is a 84 year old female who comes in today for 6-week follow-up after watchman implant    Yvonne Fatima has a past history of persistent atrial fibrillation, atrial flutter, hypertension, dyslipidemia, chronic kidney disease stage III, type 2 diabetes mellitus, obstructive sleep apnea, history of TIA.    She denies stroke or strokelike symptoms since watchman implant.  She has not been taking a baby aspirin as she reports someone told her to stop taking this after the procedure.  She is currently taking warfarin.    Yvonne Fatima denies chest discomfort, palpitations, shortness of breath, paroxysmal nocturnal dyspnea, orthopnea, lightheadedness, dizziness, pre-syncope, or syncope.  Yvonne Fatima also denies any weight loss, changes in appetite, nausea or vomiting.     Medical, surgical, family, social history, and medications were reviewed and updated as necessary.    ECHO results (from 4/10/2024):  Interpretation Summary     Structural HALI for Left Atrial Appendage Occlusion Device     Pre-Device:  1. Normal left ventricular size and systolic function. LVEF: 60-65%  2. No left atrial thrombus or spontaneous contrast. Severe left atrial  enlargement.  3. No ASD or PFO by color flow.  4. No pericardial effusion.     Post Device:  1. Well-seated PINNACLE FLXÂ  Device in left atrial appendage by 2D and 3D  imaging. No color doppler evidence of flow around device at ostium insertion  before or after device release. No change in mitral valve function. No  thrombus noted on device, LA or JENNIFER.     JENNIFER device measurements:  Device compression diameter:  Pre-deployment.  0Â : 23.8 mm  45Â : 20 mm  90Â : 20 mm  135 Â : 20.6 mm     Post-deployment  0Â : 19 mm  45Â : 17.4 mm  90Â : 20 mm  135 Â : 19 mm     2. Normal left ventricular size and systolic function. LVEF: 60-65%  3. Small ASD with unidirectional flow (left to right) by color flow doppler.  4. No post procedural  pericardial effusion.    EKG (personally reviewed and interpreted): 4/8/2024  Sinus with PACs     Physical Examination Review of Systems   Vitals: /60 (BP Location: Left arm, Patient Position: Sitting, Cuff Size: Adult Large)   Pulse 79   Resp 16   Wt 64.9 kg (143 lb)   BMI 27.02 kg/m    BMI= Body mass index is 27.02 kg/m .  Wt Readings from Last 3 Encounters:   05/22/24 64.9 kg (143 lb)   04/10/24 65 kg (143 lb 3.2 oz)   04/08/24 65.8 kg (145 lb)       General Appearance:   Alert, cooperative and in no acute distress   ENT/Mouth: membranes moist, no oral lesions or bleeding gums.      EYES:  no scleral icterus, normal conjunctivae   Neck: Thyroid not visualized   Chest/Lungs:   lungs are clear to auscultation, no rales or wheezing   Cardiovascular:   Regular . Normal first and second heart sounds with no murmurs, rubs or gallops; the carotid, radial and posterior tibial pulses are intact, no edema bilaterally    Abdomen:  Soft and nontender. Bowel sounds are present in all quadrants   Extremities: no cyanosis or clubbing   Skin: no xanthelasma, warm.    Neurologic: normal gait, normal  bilateral, no tremors   Psychiatric: Normal mood and affect       Please refer above for cardiac ROS details.      Medical History  Surgical History Family History Social History   Past Medical History:   Diagnosis Date    Atrial fibrillation (H)     [id number] score of 7-on chronic warfarin Previously failed Sotalol and Multaq therapy PVI 3/11 Repeat PVI 7/11  Recurrent A fib, most recent CV Feb 2013 Rx sotalol     Atrial flutter (H)     Atypical flutter with RVR after second PVI in July 2011      Esophageal reflux     Created by Conversion     Essential hypertension          Status post catheter ablation of atrial fibrillation 6/3/2019    PVI 3/11(PVI/Cryo + CTI line) Repeat PVI 7/11 (PVI/RF + CFE + Roof line + GREGORIO line)     Past Surgical History:   Procedure Laterality Date    CV LEFT ATRIAL APPENDAGE CLOSURE Right  4/10/2024    Procedure: Left Atrial Appendage Closure;  Surgeon: Danyel Moon MD;  Location: [facility] CATH LAB CV    EP ABLATION PULMONARY VEIN ISOLATION N/A 2023    Procedure: Ablation Atrial Fibrillation;  Surgeon: Timur Corona MD;  Location: [facility] CATH LAB CV    HC REMOVE TONSILS/ADENOIDS,<11 Y/O      Description: Tonsillectomy With Adenoidectomy;  Recorded: 10/17/2012;    HC REVISE MEDIAN N/CARPAL TUNNEL SURG      Description: Neuroplasty Decompression Median Nerve At Carpal Tunnel;  Recorded: 10/17/2012;    HI ABLATE HEART DYSRHYTHM FOCUS      Description: Catheter Ablation Atrial Fibrillation;  Recorded: 2013;  Comments: PVI 3-7-2011 (PVI/cryo + CTI line); ; Repeat PVI 2011 (PVI/RF + CFE + Roof line + GREGORIO line)    HI ABLATE HEART DYSRHYTHM FOCUS      Description: Catheter Ablation Atrial Fibrillation;  Recorded: 2014;  Comments: PVI 3-7-2011 (PVI/cryo + CTI line); ; Repeat PVI 2011 (PVI/RF + CFE + Roof line + GREGORIO line)    HI CARDIOVERSION ELECTIVE ARRHYTHMIA EXTERNAL       Fib 10/17/12; ; Flutter 2/15/13; AFl 2019     Family History   Problem Relation Age of Onset    Aneurysm Mother         brain    Emphysema Father     Lung Cancer Sister     Hypertension Brother     Lung Cancer Sister     Lung Cancer Sister     Breast Cancer Sister     Social History     Socioeconomic History    Marital status:      Spouse name: Not on file    Number of children: 3    Years of education: Not on file    Highest education level: Not on file   Occupational History    Not on file   Tobacco Use    Smoking status: Former     Current packs/day: 0.00     Types: Cigarettes     Quit date: 1962     Years since quittin.1    Smokeless tobacco: Never   Vaping Use    Vaping status: Never Used   Substance and Sexual Activity    Alcohol use: Yes     Comment: Alcoholic Drinks/day: 6 per month    Drug use: No    Sexual activity: Yes     Partners: Male   Other Topics Concern    Not on  file   Social History Narrative    Not on file     Social Determinants of Health     Financial Resource Strain: Not on file   Food Insecurity: Not on file   Transportation Needs: Not on file   Physical Activity: Not on file   Stress: Not on file   Social Connections: Not on file   Interpersonal Safety: Not on file   Housing Stability: Not on file          Medications  Allergies   Current Outpatient Medications   Medication Sig Dispense Refill    allopurinol (ZYLOPRIM) 100 MG tablet [ALLOPURINOL (ZYLOPRIM) 100 MG TABLET] Take 100 mg by mouth 2 (two) times a day.             atorvastatin (LIPITOR) 40 MG tablet Take 1 tablet by mouth daily      BD SUELLEN U/F 32G X 4 MM insulin pen needle       cholecalciferol 50 MCG (2000 UT) CAPS Take 2,000 Units by mouth 3 times daily      [START ON 5/23/2024] clopidogrel (PLAVIX) 75 MG tablet Take 1 tablet (75 mg) by mouth daily for 140 days 70 tablet 1    insulin degludec (TRESIBA FLEXTOUCH) 100 UNIT/ML pen Inject 20 Units Subcutaneous      insulin glargine (LANTUS PEN) 100 UNIT/ML pen Inject 52 Units Subcutaneous every morning      Lancets (ONETOUCH DELICA PLUS [id number]) MISC Test once daily varying time,      metFORMIN (GLUCOPHAGE XR) 500 MG 24 hr tablet Take 500 mg by mouth daily      NOVOLOG FLEXPEN 100 UNIT/ML soln INJECT THREE TIMES DAILY BEFORE MEALS: 1 UNIT IF GLUCOSE -170, 2 UNITS -200, 3 UNITS -230, 4 UNITS -260, 5 UNITS -290, 6 UNITS IF >290, MAX DOSE OF 18 UNITS DAILY.*      ONETOUCH VERIO IQ test strip       potassium chloride (KLOR-CON) 10 MEQ CR tablet [POTASSIUM CHLORIDE (KLOR-CON) 10 MEQ CR TABLET] Take 20 mEq by mouth 2 (two) times a day.      pregabalin (LYRICA) 75 MG capsule Take 75 mg by mouth      semaglutide (OZEMPIC) 2 MG/3ML pen Inject 0.5 mg Subcutaneous      telmisartan (MICARDIS) 40 MG tablet Take 40 mg by mouth daily      aspirin 81 MG EC tablet Take 1 tablet (81 mg) by mouth daily (Patient not taking: Reported on 5/22/2024)  "     spironolactone-hydrochlorothiazide (ALDACTAZIDE) 25-25 mg tablet Take by mouth daily Patient takes one-half tablet daily. (Patient not taking: Reported on 5/22/2024)      Allergies   Allergen Reactions    Pregabalin Other (See Comments)     Foggy thinking    Ace Inhibitors Cough    Empagliflozin Other (See Comments)     weakness    Irbesartan Other (See Comments)     exhaustion    Metformin GI Disturbance     2013.  Retry 2016- gastrointestinal intolerance again    Pioglitazone Other (See Comments)     Hand leg and facial swelling         Lab Results    Chemistry/lipid CBC Cardiac Enzymes/BNP/TSH/INR   No results for input(s): \"CHOL\", \"HDL\", \"LDL\", \"TRIG\", \"CHOLHDLRATIO\" in the last 52385 hours.  No results for input(s): \"LDL\" in the last 80111 hours.  Recent Labs   Lab Test 04/10/24  1253 04/10/24  1020 04/10/24  1016 04/10/24  0716 04/08/24  1601   NA  --   --   --   --  137   POTASSIUM  --   --   --   --  4.3   CHLORIDE  --   --   --   --  101   CO2  --   --   --   --  22   *   < >  --    < > 332*   BUN  --   --   --   --  23.2*   CR  --   --  0.84  --  1.00*   GFRESTIMATED  --   --  68  --  55*   HOLLIS  --   --   --   --  9.8    < > = values in this interval not displayed.     Recent Labs   Lab Test 04/10/24  1016 04/08/24  1601 09/06/23  1028   CR 0.84 1.00* 0.89     No results for input(s): \"A1C\" in the last 18070 hours. Recent Labs   Lab Test 04/10/24  1016 04/08/24  1601   WBC  --  8.6   HGB 12.3 12.6   HCT  --  38.9   MCV  --  89   PLT  --  256     Recent Labs   Lab Test 04/10/24  1016 04/08/24  1601 09/06/23  1028   HGB 12.3 12.6 13.1    No results for input(s): \"TROPONINI\" in the last 86570 hours.  No results for input(s): \"BNP\", \"NTBNPI\", \"NTBNP\" in the last 17787 hours.  No results for input(s): \"TSH\" in the last 91360 hours.  Recent Labs   Lab Test 04/10/24  0716 04/08/24  1601 09/15/23  0000   INR 2.69* 2.87* 2.6*        25 minutes spent on the date of encounter doing education, chart " prep/review, review of outside records, review of test results, interpretation with above tests, patient visit, and documentation.      This note has been dictated using voice recognition software. Any grammatical or context distortions are unintentional and inherent to the software.    Millie Randall PA-C  Structural Heart Program  [facility]

## 2024-06-04 ENCOUNTER — OFFICE VISIT (OUTPATIENT)
Dept: CARDIOLOGY | Facility: CLINIC | Age: 84
End: 2024-06-04
Payer: COMMERCIAL

## 2024-06-04 VITALS
SYSTOLIC BLOOD PRESSURE: 132 MMHG | HEART RATE: 80 BPM | RESPIRATION RATE: 18 BRPM | DIASTOLIC BLOOD PRESSURE: 60 MMHG | WEIGHT: 143 LBS | BODY MASS INDEX: 27.02 KG/M2

## 2024-06-04 DIAGNOSIS — I48.19 PERSISTENT ATRIAL FIBRILLATION (H): Primary | ICD-10-CM

## 2024-06-04 DIAGNOSIS — I48.3 TYPICAL ATRIAL FLUTTER (H): ICD-10-CM

## 2024-06-04 LAB
ATRIAL RATE - MUSE: 69 BPM
DIASTOLIC BLOOD PRESSURE - MUSE: NORMAL MMHG
INTERPRETATION ECG - MUSE: NORMAL
P AXIS - MUSE: 70 DEGREES
PR INTERVAL - MUSE: 188 MS
QRS DURATION - MUSE: 96 MS
QT - MUSE: 394 MS
QTC - MUSE: 422 MS
R AXIS - MUSE: 29 DEGREES
SYSTOLIC BLOOD PRESSURE - MUSE: NORMAL MMHG
T AXIS - MUSE: 31 DEGREES
VENTRICULAR RATE- MUSE: 69 BPM

## 2024-06-04 PROCEDURE — G2211 COMPLEX E/M VISIT ADD ON: HCPCS | Performed by: INTERNAL MEDICINE

## 2024-06-04 PROCEDURE — 93000 ELECTROCARDIOGRAM COMPLETE: CPT | Performed by: STUDENT IN AN ORGANIZED HEALTH CARE EDUCATION/TRAINING PROGRAM

## 2024-06-04 PROCEDURE — 99215 OFFICE O/P EST HI 40 MIN: CPT | Performed by: INTERNAL MEDICINE

## 2024-06-04 RX ORDER — METOPROLOL SUCCINATE 25 MG/1
25 TABLET, EXTENDED RELEASE ORAL DAILY
COMMUNITY
Start: 2024-05-31 | End: 2025-05-31

## 2024-06-04 NOTE — LETTER
"6/4/2024    Rossy Yoon MD  [facility] [address]  [facility] [address]    RE: Yvonne Fatima       Dear Colleague,     I had the pleasure of seeing Yvonne Fatima in the [facility].    [facility]  Cardiac Electrophysiology     Progress Note: Timur Corona MD    Primary Care: Rossy Yoon MD, MD    Primary Cardiologist:     Primary Electrophysiologist: Timur Corona MD    Assessment:       Persistent atrial fibrillation: Chronic problem for the patient, diagnosed > 10 years ago.   She has undergone multiple ablations most recently 9/6/2023 (focal AT, confirmed prior PVI + GREGORIO line + roofline + CTI line).  Mapping at the time of her procedure demonstrated severe loss of left atrial voltage (estimated viability <5%).  Patient was recently evaluated for dehydration and twelve-lead EKGs (not available for personal review) described as having \"junctional\" rhythm just over 100 bpm.  Twelve-lead EKG from today demonstrates sinus rhythm.  The patient has a [id number] score of 5 and underwent LAAC (Watchman) 4/10/2024.  She is on DAPT and reports some mild bruising and no neurologic events.   Essential hypertension: Chronic problem for the patient and her blood pressure today is at target on her current medical therapy.  Obstructive sleep apnea: Chronic problem for the patient and she reports consistent use of CPAP.    Recommendations:  No change in current medications  I will obtain copies of the tracings from May 30 May 31st from [facility] and review.  Follow-up in the arrhythmia clinic with the EP HEAVEN in 6 months.    Time spent: 45 minutes spent on the date of the encounter doing chart review, history and exam, documentation and further activities as noted above.    Subjective:  Yvonne Fatima (84 year old female) returns to the arrhythmia clinic for interval reevaluation of her rhythm status post previous ablation of atrial " "fibrillation.  The patient reports that she been doing well following her LAAC procedure with no rhythm or other difficulties developed symptoms around the time she increased her Ozempic dose including nausea vomiting and dehydration.  She was seen at [facility] and treated there.  Patient's twelve-lead EKGs are described as \"junctional\" tachycardia with rates in the low 100s.  Tracings are not available for my review.  The patient reports that she is steadily improved following her recent hospitalization and now just feels somewhat more tired than normal but otherwise without symptoms.  She reports no palpitations lightheadedness presyncope or syncope at this time.    Current Outpatient Medications   Medication Sig Dispense Refill    allopurinol (ZYLOPRIM) 100 MG tablet [ALLOPURINOL (ZYLOPRIM) 100 MG TABLET] Take 100 mg by mouth 2 (two) times a day.             aspirin 81 MG EC tablet Take 1 tablet (81 mg) by mouth daily (Patient not taking: Reported on 5/22/2024)      atorvastatin (LIPITOR) 40 MG tablet Take 1 tablet by mouth daily      BD SUELLEN U/F 32G X 4 MM insulin pen needle       cholecalciferol 50 MCG (2000 UT) CAPS Take 2,000 Units by mouth 3 times daily      clopidogrel (PLAVIX) 75 MG tablet Take 1 tablet (75 mg) by mouth daily for 140 days 70 tablet 1    insulin degludec (TRESIBA FLEXTOUCH) 100 UNIT/ML pen Inject 20 Units Subcutaneous      insulin glargine (LANTUS PEN) 100 UNIT/ML pen Inject 52 Units Subcutaneous every morning      Lancets (ONETOUCH DELICA PLUS [id number]) MISC Test once daily varying time,      metFORMIN (GLUCOPHAGE XR) 500 MG 24 hr tablet Take 500 mg by mouth daily      NOVOLOG FLEXPEN 100 UNIT/ML soln INJECT THREE TIMES DAILY BEFORE MEALS: 1 UNIT IF GLUCOSE -170, 2 UNITS -200, 3 UNITS -230, 4 UNITS -260, 5 UNITS -290, 6 UNITS IF >290, MAX DOSE OF 18 UNITS DAILY.*      ONETOUCH VERIO IQ test strip       potassium chloride (KLOR-CON) 10 MEQ CR tablet " [POTASSIUM CHLORIDE (KLOR-CON) 10 MEQ CR TABLET] Take 20 mEq by mouth 2 (two) times a day.      pregabalin (LYRICA) 75 MG capsule Take 75 mg by mouth      semaglutide (OZEMPIC) 2 MG/3ML pen Inject 0.5 mg Subcutaneous      spironolactone-hydrochlorothiazide (ALDACTAZIDE) 25-25 mg tablet Take by mouth daily Patient takes one-half tablet daily. (Patient not taking: Reported on 5/22/2024)         Review of Systems:     Family History  Family History   Problem Relation Age of Onset    Aneurysm Mother         brain    Emphysema Father     Lung Cancer Sister     Hypertension Brother     Lung Cancer Sister     Lung Cancer Sister     Breast Cancer Sister        Social History   reports that she quit smoking about 62 years ago. Her smoking use included cigarettes. She has never used smokeless tobacco. She reports current alcohol use. She reports that she does not use drugs.    Objective:   Vital signs in last 24 hours:  /60 (BP Location: Left arm, Patient Position: Sitting, Cuff Size: Adult Large)   Pulse 80   Resp 18   Wt 64.9 kg (143 lb)   BMI 27.02 kg/m    Physical Exam:  General: The patient is alert oriented to person place and situation.  The patient is in no acute distress at the time of my evaluation.  Eyes: Pupils are equal, round, and reactive to light.  Conjunctiva and sclera are clear.  ENT: Oral mucosa is moist and without redness. No evident nasal discharge.  Pulmonary: Lungs are clear bilaterally with no rales, rhonchi, or wheezes.    Cardiovascular exam: Rhythm is regular. S1 and S2 are normal. No significant murmur is present. JVP is normal. Lower extremities demonstrate no significant edema. Distal pulses are intact bilaterally.  Abdomen is soft, nontender, no organomegaly, and bowel sounds present.  Musculoskeletal: Spine is straight. Extremities without deformity.  Neuro: Gait is normal.   Skin is warm, dry, and otherwise intact.      Cardiographics:   Twelve-lead EKG from today  demonstrates  Normal sinus rhythm with no acute changes.    Lab Results:         Outside record review:          Thank you for allowing me to participate in the care of your patient.      Sincerely,     Timur Corona MD     [facility]  cc:   No referring provider defined for this encounter.

## 2024-06-04 NOTE — PROGRESS NOTES
"  [facility], [place]  Cardiac Electrophysiology     Progress Note: Timur Corona MD    [facility]    Primary Cardiologist:     Primary Electrophysiologist: Timur Corona MD    Assessment:       Persistent atrial fibrillation: Chronic problem for the patient, diagnosed > 10 years ago.   She has undergone multiple ablations most recently 9/6/2023 (focal AT, confirmed prior PVI + GREGORIO line + roofline + CTI line).  Mapping at the time of her procedure demonstrated severe loss of left atrial voltage (estimated viability <5%).  Patient was recently evaluated for dehydration and twelve-lead EKGs (not available for personal review) described as having \"junctional\" rhythm just over 100 bpm.  Twelve-lead EKG from today demonstrates sinus rhythm.  The patient has a [id number] score of 5 and underwent LAAC (Watchman) 4/10/2024.  She is on DAPT and reports some mild bruising and no neurologic events.   Essential hypertension: Chronic problem for the patient and her blood pressure today is at target on her current medical therapy.  Obstructive sleep apnea: Chronic problem for the patient and she reports consistent use of CPAP.    Recommendations:  No change in current medications  I will obtain copies of the tracings from May 30 May 31st from [facility] and review.  Follow-up in the arrhythmia clinic with the EP [facility] in 6 months.    Time spent: 45 minutes spent on the date of the encounter doing chart review, history and exam, documentation and further activities as noted above.    Subjective:  Yvonne Fatima (84 year old female) returns to the arrhythmia clinic for interval reevaluation of her rhythm status post previous ablation of atrial fibrillation.  The patient reports that she been doing well following her LAAC procedure with no rhythm or other difficulties developed symptoms around the time she increased her Ozempic dose including nausea vomiting and dehydration.  She was seen at " "[facility] and treated there.  Patient's twelve-lead EKGs are described as \"junctional\" tachycardia with rates in the low 100s.  Tracings are not available for my review.  The patient reports that she is steadily improved following her recent hospitalization and now just feels somewhat more tired than normal but otherwise without symptoms.  She reports no palpitations lightheadedness presyncope or syncope at this time.    Current Outpatient Medications   Medication Sig Dispense Refill    allopurinol (ZYLOPRIM) 100 MG tablet [ALLOPURINOL (ZYLOPRIM) 100 MG TABLET] Take 100 mg by mouth 2 (two) times a day.             aspirin 81 MG EC tablet Take 1 tablet (81 mg) by mouth daily (Patient not taking: Reported on 5/22/2024)      atorvastatin (LIPITOR) 40 MG tablet Take 1 tablet by mouth daily      BD SUELLEN U/F 32G X 4 MM insulin pen needle       cholecalciferol 50 MCG (2000 UT) CAPS Take 2,000 Units by mouth 3 times daily      clopidogrel (PLAVIX) 75 MG tablet Take 1 tablet (75 mg) by mouth daily for 140 days 70 tablet 1    insulin degludec (TRESIBA FLEXTOUCH) 100 UNIT/ML pen Inject 20 Units Subcutaneous      insulin glargine (LANTUS PEN) 100 UNIT/ML pen Inject 52 Units Subcutaneous every morning      Lancets (ONETOUCH DELICA PLUS [id number]) MISC Test once daily varying time,      metFORMIN (GLUCOPHAGE XR) 500 MG 24 hr tablet Take 500 mg by mouth daily      NOVOLOG FLEXPEN 100 UNIT/ML soln INJECT THREE TIMES DAILY BEFORE MEALS: 1 UNIT IF GLUCOSE -170, 2 UNITS -200, 3 UNITS -230, 4 UNITS -260, 5 UNITS -290, 6 UNITS IF >290, MAX DOSE OF 18 UNITS DAILY.*      ONETOUCH VERIO IQ test strip       potassium chloride (KLOR-CON) 10 MEQ CR tablet [POTASSIUM CHLORIDE (KLOR-CON) 10 MEQ CR TABLET] Take 20 mEq by mouth 2 (two) times a day.      pregabalin (LYRICA) 75 MG capsule Take 75 mg by mouth      semaglutide (OZEMPIC) 2 MG/3ML pen Inject 0.5 mg Subcutaneous      " spironolactone-hydrochlorothiazide (ALDACTAZIDE) 25-25 mg tablet Take by mouth daily Patient takes one-half tablet daily. (Patient not taking: Reported on 5/22/2024)         Review of Systems:     Family History  Family History   Problem Relation Age of Onset    Aneurysm Mother         brain    Emphysema Father     Lung Cancer Sister     Hypertension Brother     Lung Cancer Sister     Lung Cancer Sister     Breast Cancer Sister        Social History   reports that she quit smoking about 62 years ago. Her smoking use included cigarettes. She has never used smokeless tobacco. She reports current alcohol use. She reports that she does not use drugs.    Objective:   Vital signs in last 24 hours:  /60 (BP Location: Left arm, Patient Position: Sitting, Cuff Size: Adult Large)   Pulse 80   Resp 18   Wt 64.9 kg (143 lb)   BMI 27.02 kg/m    Physical Exam:  General: The patient is alert oriented to person place and situation.  The patient is in no acute distress at the time of my evaluation.  Eyes: Pupils are equal, round, and reactive to light.  Conjunctiva and sclera are clear.  ENT: Oral mucosa is moist and without redness. No evident nasal discharge.  Pulmonary: Lungs are clear bilaterally with no rales, rhonchi, or wheezes.    Cardiovascular exam: Rhythm is regular. S1 and S2 are normal. No significant murmur is present. JVP is normal. Lower extremities demonstrate no significant edema. Distal pulses are intact bilaterally.  Abdomen is soft, nontender, no organomegaly, and bowel sounds present.  Musculoskeletal: Spine is straight. Extremities without deformity.  Neuro: Gait is normal.   Skin is warm, dry, and otherwise intact.      Cardiographics:   Twelve-lead EKG from today demonstrates  Normal sinus rhythm with no acute changes.    Lab Results:         Outside record review:

## 2024-06-04 NOTE — Clinical Note
Hi team, Please schedule patient for follow-up with the arrhythmia clinic EP [facility] in 6 months.  EP team, Please see if we can track down the twelve-lead EKGs from [facility] on 5/30 and 5/31.  There should be 3 tracings Thanks Timur

## 2024-07-12 ENCOUNTER — HOSPITAL ENCOUNTER (OUTPATIENT)
Dept: CT IMAGING | Facility: CLINIC | Age: 84
Discharge: HOME | End: 2024-07-12
Attending: INTERNAL MEDICINE | Admitting: INTERNAL MEDICINE
Payer: COMMERCIAL

## 2024-07-12 DIAGNOSIS — I48.19 PERSISTENT ATRIAL FIBRILLATION (H): ICD-10-CM

## 2024-07-12 LAB — BSA FOR ECHO PROCEDURE: 1 M2

## 2024-07-12 PROCEDURE — 75572 CT HRT W/3D IMAGE: CPT | Mod: 26 | Performed by: INTERNAL MEDICINE

## 2024-07-12 PROCEDURE — 250N000011 HC RX IP 250 OP 636: Performed by: INTERNAL MEDICINE

## 2024-07-12 PROCEDURE — 75572 CT HRT W/3D IMAGE: CPT

## 2024-07-12 RX ORDER — IOPAMIDOL 755 MG/ML
100 INJECTION, SOLUTION INTRAVASCULAR ONCE
Status: COMPLETED | OUTPATIENT
Start: 2024-07-12 | End: 2024-07-12

## 2024-07-12 RX ADMIN — IOPAMIDOL 120 ML: 755 INJECTION, SOLUTION INTRAVENOUS at 13:54

## 2024-07-12 NOTE — PROGRESS NOTES
[facility]; Creatinine 0.8, GFR >60. [place]

## 2024-07-15 ENCOUNTER — TELEPHONE (OUTPATIENT)
Dept: CARDIOLOGY | Facility: CLINIC | Age: 84
End: 2024-07-15
Payer: COMMERCIAL

## 2024-07-15 NOTE — TELEPHONE ENCOUNTER
Writer faxed this report and CT results to Dr. Rossy Yoon at [phone]. [facility]

## 2024-07-15 NOTE — TELEPHONE ENCOUNTER
"Patient s/p LAAC BSCI - WM FLX, 4/10/2024. Per post-LAAC medication protocol, patient to remain on once daily 81 mg ASA for life and once daily 75 mg Plavix until six months post-LAAC, which will be 10/10/2024. Imaging interpretation summary shows:    \"Watchman device seats well. No thrombus is identified on the surface of Watchman device. There is no blood flow communication between left atrial appendage and left atrium.\"    Radiologist overread shows:    \"FINDINGS:       LIMITED CHEST: Mild basilar predominant atelectasis and/or scarring noted.     LIMITED MEDIASTINUM: Left atrial appendage occluder device. Small hiatus hernia.     LIMITED UPPER ABDOMEN: 10 mm right hepatic hypodensity was not included in the field-of-view on the prior exam (series 5, image 58).                                                                     IMPRESSION:    1.  Right liver 10 mm hypodensity was not included in the field-of-view on the prior exam and is technically indeterminate (series 5, image 58). However, this likely represents a cyst or hemangioma. Recommend liver ultrasound.  2.  Please refer to cardiologist's dictation for the cardiac CT report.\"    Phone call to patient, discussed results. Post-LAAC Imaging shows no concern for DRT or leak around edges of device. Patient stated they will remain on medications as prescribed, otherwise patient will be seen at 6 month matteo to discuss medication changes. Patient appreciative and has writer's direct office number for questions or concerns. Call transferred to scheduling to arrange 6 month follow-up. No further questions at this time.     Patient notified of potential incidental findings. Writer to send reports over to PCP Dr. Yoon for recommendations and follow-up. [facility]   "

## 2024-07-21 ENCOUNTER — HEALTH MAINTENANCE LETTER (OUTPATIENT)
Age: 84
End: 2024-07-21

## 2024-10-16 ENCOUNTER — OFFICE VISIT (OUTPATIENT)
Dept: CARDIOLOGY | Facility: CLINIC | Age: 84
End: 2024-10-16
Payer: COMMERCIAL

## 2024-10-16 VITALS
RESPIRATION RATE: 16 BRPM | HEIGHT: 61 IN | HEART RATE: 81 BPM | BODY MASS INDEX: 27.19 KG/M2 | OXYGEN SATURATION: 97 % | DIASTOLIC BLOOD PRESSURE: 60 MMHG | WEIGHT: 144 LBS | SYSTOLIC BLOOD PRESSURE: 132 MMHG

## 2024-10-16 DIAGNOSIS — Z95.818 PRESENCE OF WATCHMAN LEFT ATRIAL APPENDAGE CLOSURE DEVICE: ICD-10-CM

## 2024-10-16 PROCEDURE — G2211 COMPLEX E/M VISIT ADD ON: HCPCS | Performed by: INTERNAL MEDICINE

## 2024-10-16 PROCEDURE — 99214 OFFICE O/P EST MOD 30 MIN: CPT | Performed by: INTERNAL MEDICINE

## 2024-10-16 NOTE — PROGRESS NOTES
HEART CARE ENCOUNTER NOTE       [facility]  [phone]      Assessment/Recommendations   1.  Persistent atrial fibrillation/atrial flutter - status post LAAO with 27 mm Watchman FLX device on 4/10/2024.  She is currently taking ASA 81 mg daily.   She has had no stroke-like symptoms or events.     MODIFIED CHARLOTTE SCALE   Timepoint: 6-months post-LAAC    Previous score: 0    Score Description   0 No symptoms at all   1 No significant disability despite symptoms; able to carry out all usual duties and activities   2 Slight disability; unable to carry out all previous activities, but able to look after own affairs without assistance   3 Moderate disability; requiring some help, but able to walk without assistance   4 Moderately severe disability; unable to walk without assistance and unable to attend to own bodily needs without assistance   5 Severe disability; bedridden, incontinent and requiring constant nursing care and attention   6 Dead    Total score (0 - 6):  0    Change in score if s/p LAAC? No    2.  Hypertension -blood pressure is controlled. She should continue spironolactone-hydrochlorothiazide 25/25 mg daily and metoprolol succinate 25 mg daily.      3.  Dyslipidemia - last LDL 51 (December 2023). Continue Lipitor 40 mg daily     4. Type II DM - currently on tresiba, lantus, novolog, semaglutide and metformin.  Most recent Hgb A1C was 7      The longitudinal plan of care for the diagnosis(es)/condition(s) as documented were addressed during this visit. Due to the added complexity in care, we will continue to support Ellyn in the subsequent management and with ongoing continuity of care.        History of Present Illness/Subjective    Yvonne Fatima is a 84 year old female who comes in today for 6-month follow-up after watchman implant    Yvonne Fatima has a past history of persistent atrial fibrillation, atrial flutter, hypertension, dyslipidemia, chronic kidney disease stage III,  "type 2 diabetes mellitus, obstructive sleep apnea, history of TIA.    She denies stroke or strokelike symptoms since watchman implant.  She finished the Plavix recently.  She has no other complaints.     Yvonne Fatima denies chest discomfort, palpitations, shortness of breath, paroxysmal nocturnal dyspnea, orthopnea, lightheadedness, dizziness, pre-syncope, or syncope.  Yvonne Fatima also denies any weight loss, changes in appetite, nausea or vomiting.     Medical, surgical, family, social history, and medications were reviewed and updated as necessary.    CTA from 7/12/24 (reviewed):  Watchman device seats well.  No thrombus is identified on the surface of Watchman device.  There is no blood flow communication between left atrial appendage and left atrium.  Mild calcified coronary artery disease, appears no obstructive lesion.  Right dominant system.  Normal size of thoracic aorta.  No pericardial effusion or calcified pericardium.     Physical Examination Review of Systems   Vitals: /60 (BP Location: Left arm, Patient Position: Sitting, Cuff Size: Adult Regular)   Pulse 81   Resp 16   Ht 1.549 m (5' 1\")   Wt 65.3 kg (144 lb)   SpO2 97%   BMI 27.21 kg/m    BMI= Body mass index is 27.21 kg/m .  Wt Readings from Last 3 Encounters:   10/16/24 65.3 kg (144 lb)   06/04/24 64.9 kg (143 lb)   05/22/24 64.9 kg (143 lb)       General Appearance:   Alert, cooperative and in no acute distress   ENT/Mouth: membranes moist, no oral lesions or bleeding gums.      EYES:  no scleral icterus, normal conjunctivae   Neck: Thyroid not visualized   Chest/Lungs:   lungs are clear to auscultation, no rales or wheezing   Cardiovascular:   Regular . Normal first and second heart sounds with no murmurs, rubs or gallops; the carotid, radial and posterior tibial pulses are intact, no edema bilaterally    Abdomen:  Soft and nontender. Bowel sounds are present in all quadrants   Extremities: no cyanosis or clubbing   Skin: no " xanthelasma, warm.    Neurologic: normal gait, normal  bilateral, no tremors   Psychiatric: Normal mood and affect       Please refer above for cardiac ROS details.      Medical History  Surgical History Family History Social History   Past Medical History:   Diagnosis Date    Atrial fibrillation (H)     [id number] score of 7-on chronic warfarin Previously failed Sotalol and Multaq therapy PVI 3/11 Repeat PVI 7/11  Recurrent A fib, most recent CV Feb 2013 Rx sotalol     Atrial flutter (H)     Atypical flutter with RVR after second PVI in July 2011      Esophageal reflux     Created by Conversion     Essential hypertension          Status post catheter ablation of atrial fibrillation 6/3/2019    PVI 3/11(PVI/Cryo + CTI line) Repeat PVI 7/11 (PVI/RF + CFE + Roof line + GREGORIO line)     Past Surgical History:   Procedure Laterality Date    CV LEFT ATRIAL APPENDAGE CLOSURE Right 4/10/2024    Procedure: Left Atrial Appendage Closure;  Surgeon: Danyel Moon MD;  Location: [facility] CATH LAB CV    EP ABLATION PULMONARY VEIN ISOLATION N/A 9/6/2023    Procedure: Ablation Atrial Fibrillation;  Surgeon: Timur Corona MD;  Location: [facility] LAB CV    HC REMOVE TONSILS/ADENOIDS,<11 Y/O      Description: Tonsillectomy With Adenoidectomy;  Recorded: 10/17/2012;    HC REVISE MEDIAN N/CARPAL TUNNEL SURG      Description: Neuroplasty Decompression Median Nerve At Carpal Tunnel;  Recorded: 10/17/2012;    MD ABLATE HEART DYSRHYTHM FOCUS      Description: Catheter Ablation Atrial Fibrillation;  Recorded: 06/20/2013;  Comments: PVI 3-7-2011 (PVI/cryo + CTI line); ; Repeat PVI July 2011 (PVI/RF + CFE + Roof line + GREGORIO line)    MD ABLATE HEART DYSRHYTHM FOCUS      Description: Catheter Ablation Atrial Fibrillation;  Recorded: 08/05/2014;  Comments: PVI 3-7-2011 (PVI/cryo + CTI line); ; Repeat PVI July 2011 (PVI/RF + CFE + Roof line + GREGORIO line)    MD CARDIOVERSION ELECTIVE ARRHYTHMIA EXTERNAL       Fib 10/17/12; ; Flutter  2/15/13; AFl 2019     Family History   Problem Relation Age of Onset    Aneurysm Mother         brain    Emphysema Father     Lung Cancer Sister     Hypertension Brother     Lung Cancer Sister     Lung Cancer Sister     Breast Cancer Sister     Social History     Socioeconomic History    Marital status:      Spouse name: Not on file    Number of children: 3    Years of education: Not on file    Highest education level: Not on file   Occupational History    Not on file   Tobacco Use    Smoking status: Former     Current packs/day: 0.00     Types: Cigarettes     Quit date: 1962     Years since quittin.5    Smokeless tobacco: Never   Vaping Use    Vaping status: Never Used   Substance and Sexual Activity    Alcohol use: Yes     Comment: Alcoholic Drinks/day: 6 per month    Drug use: No    Sexual activity: Yes     Partners: Male   Other Topics Concern    Not on file   Social History Narrative    Not on file     Social Determinants of Health     Financial Resource Strain: Not on file   Food Insecurity: No Food Insecurity (2024)    Received from Fwd: [facility]    Hunger Vital Sign     Worried About Running Out of Food in the Last Year: Never true     Ran Out of Food in the Last Year: Never true   Transportation Needs: No Transportation Needs (2024)    Received from Fwd: [facility]    PRAPARE - Transportation     Lack of Transportation (Medical): No     Lack of Transportation (Non-Medical): No   Physical Activity: Not on file   Stress: Not on file   Social Connections: Not on file   Interpersonal Safety: Unknown (2024)    Received from Fwd: [facility]    Humiliation, Afraid, Rape, and Kick questionnaire     Fear of Current or Ex-Partner: Not on file     Emotionally Abused: Not on file     Physically Abused: No     Sexually Abused: No   Housing Stability: Unknown (2024)    Received from Fwd: [facility]    Housing Stability Vital Sign     Unable to Pay for Housing in the Last Year: No      Number of Places Lived in the Last Year: Not on file     Unstable Housing in the Last Year: No          Medications  Allergies   Current Outpatient Medications   Medication Sig Dispense Refill    allopurinol (ZYLOPRIM) 100 MG tablet [ALLOPURINOL (ZYLOPRIM) 100 MG TABLET] Take 100 mg by mouth 2 (two) times a day.             aspirin 81 MG EC tablet Take 1 tablet (81 mg) by mouth daily      atorvastatin (LIPITOR) 40 MG tablet Take 1 tablet by mouth daily      BD SUELLEN U/F 32G X 4 MM insulin pen needle       cholecalciferol 50 MCG (2000 UT) CAPS Take 2,000 Units by mouth daily      insulin degludec (TRESIBA FLEXTOUCH) 100 UNIT/ML pen Inject 20 Units Subcutaneous      insulin glargine (LANTUS PEN) 100 UNIT/ML pen Inject 52 Units Subcutaneous every morning      Lancets (ONETOUCH DELICA PLUS [id number]) MISC Test once daily varying time,      metFORMIN (GLUCOPHAGE XR) 500 MG 24 hr tablet Take 500 mg by mouth daily      metoprolol succinate ER (TOPROL XL) 25 MG 24 hr tablet Take 25 mg by mouth daily      NOVOLOG FLEXPEN 100 UNIT/ML soln INJECT THREE TIMES DAILY BEFORE MEALS: 1 UNIT IF GLUCOSE -170, 2 UNITS -200, 3 UNITS -230, 4 UNITS -260, 5 UNITS -290, 6 UNITS IF >290, MAX DOSE OF 18 UNITS DAILY.*      ONETOUCH VERIO IQ test strip       potassium chloride (KLOR-CON) 10 MEQ CR tablet Take 20 mEq by mouth 2 times daily.      pregabalin (LYRICA) 75 MG capsule Take 75 mg by mouth      semaglutide (OZEMPIC) 2 MG/3ML pen Inject 0.5 mg subcutaneously.      spironolactone-hydrochlorothiazide (ALDACTAZIDE) 25-25 mg tablet Take by mouth daily. Patient takes one-half tablet daily.      clopidogrel (PLAVIX) 75 MG tablet Take 1 tablet (75 mg) by mouth daily for 140 days 70 tablet 1    Allergies   Allergen Reactions    Pregabalin Other (See Comments)     Foggy thinking    Ace Inhibitors Cough    Empagliflozin Other (See Comments)     weakness    Irbesartan Other (See Comments)     exhaustion    Metformin  "GI Disturbance     2013.  Retry 2016- gastrointestinal intolerance again    Pioglitazone Other (See Comments)     Hand leg and facial swelling         Lab Results    Chemistry/lipid CBC Cardiac Enzymes/BNP/TSH/INR   No results for input(s): \"CHOL\", \"HDL\", \"LDL\", \"TRIG\", \"CHOLHDLRATIO\" in the last 07555 hours.  No results for input(s): \"LDL\" in the last 25333 hours.  Recent Labs   Lab Test 04/10/24  1253 04/10/24  1020 04/10/24  1016 04/10/24  0716 04/08/24  1601   NA  --   --   --   --  137   POTASSIUM  --   --   --   --  4.3   CHLORIDE  --   --   --   --  101   CO2  --   --   --   --  22   *   < >  --    < > 332*   BUN  --   --   --   --  23.2*   CR  --   --  0.84  --  1.00*   GFRESTIMATED  --   --  68  --  55*   HOLLIS  --   --   --   --  9.8    < > = values in this interval not displayed.     Recent Labs   Lab Test 04/10/24  1016 04/08/24  1601 09/06/23  1028   CR 0.84 1.00* 0.89     No results for input(s): \"A1C\" in the last 76295 hours. Recent Labs   Lab Test 04/10/24  1016 04/08/24  1601   WBC  --  8.6   HGB 12.3 12.6   HCT  --  38.9   MCV  --  89   PLT  --  256     Recent Labs   Lab Test 04/10/24  1016 04/08/24  1601 09/06/23  1028   HGB 12.3 12.6 13.1    No results for input(s): \"TROPONINI\" in the last 12989 hours.  No results for input(s): \"BNP\", \"NTBNPI\", \"NTBNP\" in the last 31858 hours.  No results for input(s): \"TSH\" in the last 25757 hours.  Recent Labs   Lab Test 04/10/24  0716 04/08/24  1601 09/15/23  0000   INR 2.69* 2.87* 2.6*            This note has been dictated using voice recognition software. Any grammatical or context distortions are unintentional and inherent to the software.        "

## 2024-10-16 NOTE — LETTER
10/16/2024    Rossy Yoon MD  [facility] [address]  [facility] [address]    RE: Yvonne Fatima       Dear Colleague,     I had the pleasure of seeing Yvonne Fatima in the [facility].  HEART CARE ENCOUNTER NOTE       [facility]  [phone]      Assessment/Recommendations   1.  Persistent atrial fibrillation/atrial flutter - status post LAAO with 27 mm Watchman FLX device on 4/10/2024.  She is currently taking ASA 81 mg daily.   She has had no stroke-like symptoms or events.     MODIFIED CHARLOTTE SCALE   Timepoint: 6-months post-LAAC    Previous score: 0    Score Description   0 No symptoms at all   1 No significant disability despite symptoms; able to carry out all usual duties and activities   2 Slight disability; unable to carry out all previous activities, but able to look after own affairs without assistance   3 Moderate disability; requiring some help, but able to walk without assistance   4 Moderately severe disability; unable to walk without assistance and unable to attend to own bodily needs without assistance   5 Severe disability; bedridden, incontinent and requiring constant nursing care and attention   6 Dead    Total score (0 - 6):  0    Change in score if s/p LAAC? No    2.  Hypertension -blood pressure is controlled. She should continue spironolactone-hydrochlorothiazide 25/25 mg daily and metoprolol succinate 25 mg daily.      3.  Dyslipidemia - last LDL 51 (December 2023). Continue Lipitor 40 mg daily     4. Type II DM - currently on tresiba, lantus, novolog, semaglutide and metformin.  Most recent Hgb A1C was 7      The longitudinal plan of care for the diagnosis(es)/condition(s) as documented were addressed during this visit. Due to the added complexity in care, we will continue to support Ellyn in the subsequent management and with ongoing continuity of care.        History of Present Illness/Subjective    Yvonne MARTIN  "Akua is a 84 year old female who comes in today for 6-month follow-up after watchman implant    Yvonne Fatima has a past history of persistent atrial fibrillation, atrial flutter, hypertension, dyslipidemia, chronic kidney disease stage III, type 2 diabetes mellitus, obstructive sleep apnea, history of TIA.    She denies stroke or strokelike symptoms since watchman implant.  She finished the Plavix recently.  She has no other complaints.     Yvonne Fatima denies chest discomfort, palpitations, shortness of breath, paroxysmal nocturnal dyspnea, orthopnea, lightheadedness, dizziness, pre-syncope, or syncope.  Yvonne Fatima also denies any weight loss, changes in appetite, nausea or vomiting.     Medical, surgical, family, social history, and medications were reviewed and updated as necessary.    CTA from 7/12/24 (reviewed):  Watchman device seats well.  No thrombus is identified on the surface of Watchman device.  There is no blood flow communication between left atrial appendage and left atrium.  Mild calcified coronary artery disease, appears no obstructive lesion.  Right dominant system.  Normal size of thoracic aorta.  No pericardial effusion or calcified pericardium.     Physical Examination Review of Systems   Vitals: /60 (BP Location: Left arm, Patient Position: Sitting, Cuff Size: Adult Regular)   Pulse 81   Resp 16   Ht 1.549 m (5' 1\")   Wt 65.3 kg (144 lb)   SpO2 97%   BMI 27.21 kg/m    BMI= Body mass index is 27.21 kg/m .  Wt Readings from Last 3 Encounters:   10/16/24 65.3 kg (144 lb)   06/04/24 64.9 kg (143 lb)   05/22/24 64.9 kg (143 lb)       General Appearance:   Alert, cooperative and in no acute distress   ENT/Mouth: membranes moist, no oral lesions or bleeding gums.      EYES:  no scleral icterus, normal conjunctivae   Neck: Thyroid not visualized   Chest/Lungs:   lungs are clear to auscultation, no rales or wheezing   Cardiovascular:   Regular . Normal first and second heart " sounds with no murmurs, rubs or gallops; the carotid, radial and posterior tibial pulses are intact, no edema bilaterally    Abdomen:  Soft and nontender. Bowel sounds are present in all quadrants   Extremities: no cyanosis or clubbing   Skin: no xanthelasma, warm.    Neurologic: normal gait, normal  bilateral, no tremors   Psychiatric: Normal mood and affect       Please refer above for cardiac ROS details.      Medical History  Surgical History Family History Social History   Past Medical History:   Diagnosis Date     Atrial fibrillation (H)     [id number] score of 7-on chronic warfarin Previously failed Sotalol and Multaq therapy PVI 3/11 Repeat PVI 7/11  Recurrent A fib, most recent CV Feb 2013 Rx sotalol      Atrial flutter (H)     Atypical flutter with RVR after second PVI in July 2011       Esophageal reflux     Created by Conversion      Essential hypertension           Status post catheter ablation of atrial fibrillation 6/3/2019    PVI 3/11(PVI/Cryo + CTI line) Repeat PVI 7/11 (PVI/RF + CFE + Roof line + GREGORIO line)     Past Surgical History:   Procedure Laterality Date     CV LEFT ATRIAL APPENDAGE CLOSURE Right 4/10/2024    Procedure: Left Atrial Appendage Closure;  Surgeon: Danyel Moon MD;  [facility]: [facility]     EP ABLATION PULMONARY VEIN ISOLATION N/A 9/6/2023    Procedure: Ablation Atrial Fibrillation;  Surgeon: Timur Corona MD;  Location: [facility]     HC REMOVE TONSILS/ADENOIDS,<13 Y/O      Description: Tonsillectomy With Adenoidectomy;  Recorded: 10/17/2012;     HC REVISE MEDIAN N/CARPAL TUNNEL SURG      Description: Neuroplasty Decompression Median Nerve At Carpal Tunnel;  Recorded: 10/17/2012;     [place] ABLATE HEART DYSRHYTHM FOCUS      Description: Catheter Ablation Atrial Fibrillation;  Recorded: 06/20/2013;  Comments: PVI 3-7-2011 (PVI/cryo + CTI line); ; Repeat PVI July 2011 (PVI/RF + CFE + Roof line + GREGORIO line)     [place] ABLATE HEART DYSRHYTHM FOCUS       Description: Catheter Ablation Atrial Fibrillation;  Recorded: 2014;  Comments: PVI 3-7-2011 (PVI/cryo + CTI line); ; Repeat PVI 2011 (PVI/RF + CFE + Roof line + GREGORIO line)     [place] CARDIOVERSION ELECTIVE ARRHYTHMIA EXTERNAL       Fib 10/17/12; ; Flutter 2/15/13; AFl 2019     Family History   Problem Relation Age of Onset     Aneurysm Mother         brain     Emphysema Father      Lung Cancer Sister      Hypertension Brother      Lung Cancer Sister      Lung Cancer Sister      Breast Cancer Sister     Social History     Socioeconomic History     Marital status:      Spouse name: Not on file     Number of children: 3     Years of education: Not on file     Highest education level: Not on file   Occupational History     Not on file   Tobacco Use     Smoking status: Former     Current packs/day: 0.00     Types: Cigarettes     Quit date: 1962     Years since quittin.5     Smokeless tobacco: Never   Vaping Use     Vaping status: Never Used   Substance and Sexual Activity     Alcohol use: Yes     Comment: Alcoholic Drinks/day: 6 per month     Drug use: No     Sexual activity: Yes     Partners: Male   Other Topics Concern     Not on file   Social History Narrative     Not on file     Social Determinants of Health     Financial Resource Strain: Not on file   Food Insecurity: No Food Insecurity (2024)    Received from [facility]    Hunger Vital Sign      Worried About Running Out of Food in the Last Year: Never true      Ran Out of Food in the Last Year: Never true   Transportation Needs: No Transportation Needs (2024)    Received from [facility]    PRAPARE - Transportation      Lack of Transportation (Medical): No      Lack of Transportation (Non-Medical): No   Physical Activity: Not on file   Stress: Not on file   Social Connections: Not on file   Interpersonal Safety: Unknown (2024)    Received from [facility]    Humiliation, Afraid, Rape, and Kick questionnaire       Fear of Current or Ex-Partner: Not on file      Emotionally Abused: Not on file      Physically Abused: No      Sexually Abused: No   Housing Stability: Unknown (5/31/2024)    [facility]    Housing Stability Vital Sign      Unable to Pay for Housing in the Last Year: No      Number of Places Lived in the Last Year: Not on file      Unstable Housing in the Last Year: No          Medications  Allergies   Current Outpatient Medications   Medication Sig Dispense Refill     allopurinol (ZYLOPRIM) 100 MG tablet [ALLOPURINOL (ZYLOPRIM) 100 MG TABLET] Take 100 mg by mouth 2 (two) times a day.              aspirin 81 MG EC tablet Take 1 tablet (81 mg) by mouth daily       atorvastatin (LIPITOR) 40 MG tablet Take 1 tablet by mouth daily       BD SUELLEN U/F 32G X 4 MM insulin pen needle        cholecalciferol 50 MCG (2000 UT) CAPS Take 2,000 Units by mouth daily       insulin degludec (TRESIBA FLEXTOUCH) 100 UNIT/ML pen Inject 20 Units Subcutaneous       insulin glargine (LANTUS PEN) 100 UNIT/ML pen Inject 52 Units Subcutaneous every morning       Lancets (ONETOUCH DELICA PLUS [id number]) MISC Test once daily varying time,       metFORMIN (GLUCOPHAGE XR) 500 MG 24 hr tablet Take 500 mg by mouth daily       metoprolol succinate ER (TOPROL XL) 25 MG 24 hr tablet Take 25 mg by mouth daily       NOVOLOG FLEXPEN 100 UNIT/ML soln INJECT THREE TIMES DAILY BEFORE MEALS: 1 UNIT IF GLUCOSE -170, 2 UNITS -200, 3 UNITS -230, 4 UNITS -260, 5 UNITS -290, 6 UNITS IF >290, MAX DOSE OF 18 UNITS DAILY.*       ONETOUCH VERIO IQ test strip        potassium chloride (KLOR-CON) 10 MEQ CR tablet Take 20 mEq by mouth 2 times daily.       pregabalin (LYRICA) 75 MG capsule Take 75 mg by mouth       semaglutide (OZEMPIC) 2 MG/3ML pen Inject 0.5 mg subcutaneously.       spironolactone-hydrochlorothiazide (ALDACTAZIDE) 25-25 mg tablet Take by mouth daily. Patient takes one-half tablet daily.       clopidogrel  "(PLAVIX) 75 MG tablet Take 1 tablet (75 mg) by mouth daily for 140 days 70 tablet 1    Allergies   Allergen Reactions     Pregabalin Other (See Comments)     Foggy thinking     Ace Inhibitors Cough     Empagliflozin Other (See Comments)     weakness     Irbesartan Other (See Comments)     exhaustion     Metformin GI Disturbance     2013.  Retry 2016- gastrointestinal intolerance again     Pioglitazone Other (See Comments)     Hand leg and facial swelling         Lab Results    Chemistry/lipid CBC Cardiac Enzymes/BNP/TSH/INR   No results for input(s): \"CHOL\", \"HDL\", \"LDL\", \"TRIG\", \"CHOLHDLRATIO\" in the last 99379 hours.  No results for input(s): \"LDL\" in the last 49954 hours.  Recent Labs   Lab Test 04/10/24  1253 04/10/24  1020 04/10/24  1016 04/10/24  0716 04/08/24  1601   NA  --   --   --   --  137   POTASSIUM  --   --   --   --  4.3   CHLORIDE  --   --   --   --  101   CO2  --   --   --   --  22   *   < >  --    < > 332*   BUN  --   --   --   --  23.2*   CR  --   --  0.84  --  1.00*   GFRESTIMATED  --   --  68  --  55*   HOLLIS  --   --   --   --  9.8    < > = values in this interval not displayed.     Recent Labs   Lab Test 04/10/24  1016 04/08/24  1601 09/06/23  1028   CR 0.84 1.00* 0.89     No results for input(s): \"A1C\" in the last 74227 hours. Recent Labs   Lab Test 04/10/24  1016 04/08/24  1601   WBC  --  8.6   HGB 12.3 12.6   HCT  --  38.9   MCV  --  89   PLT  --  256     Recent Labs   Lab Test 04/10/24  1016 04/08/24  1601 09/06/23  1028   HGB 12.3 12.6 13.1    No results for input(s): \"TROPONINI\" in the last 37148 hours.  No results for input(s): \"BNP\", \"NTBNPI\", \"NTBNP\" in the last 81689 hours.  No results for input(s): \"TSH\" in the last 60483 hours.  Recent Labs   Lab Test 04/10/24  0716 04/08/24  1601 09/15/23  0000   INR 2.69* 2.87* 2.6*            This note has been dictated using voice recognition software. Any grammatical or context distortions are unintentional and inherent to the " software.          Thank you for allowing me to participate in the care of your patient.      Sincerely,     SARAH GOODRICH PA-C     [facility]  cc:   Danyel Moon MD  [address]  [place] [address]

## 2024-10-16 NOTE — PATIENT INSTRUCTIONS
Yvonne Fatima,    It was a pleasure to see you today in the clinic regarding your recent Watchman implant.     My recommendations after this visit include:     - Plavix removed from medication list  - continue aspirin for life  - no other changes      You should see us again in April for your 1 year visit      If you have questions or concerns, please call using the numbers below:    JENNIFER (Watchman/Amulet) clinic phone   [phone]       After Hours/Scheduling  [phone]    Otherwise you can dial the nurse directly at:                  Vandana Mohr RN  [phone]    Hayley Downs RN  [phone]                   CKD (chronic kidney disease) CKD (chronic kidney disease) CKD (chronic kidney disease) Stage 4 chronic kidney disease CKD (chronic kidney disease) Stage 4 chronic kidney disease Stage 4 chronic kidney disease

## 2024-12-08 ENCOUNTER — HEALTH MAINTENANCE LETTER (OUTPATIENT)
Age: 84
End: 2024-12-08

## 2025-01-16 DIAGNOSIS — I48.19 PERSISTENT ATRIAL FIBRILLATION (H): ICD-10-CM

## 2025-01-16 DIAGNOSIS — Z95.818 PRESENCE OF WATCHMAN LEFT ATRIAL APPENDAGE CLOSURE DEVICE: Primary | ICD-10-CM

## 2025-02-15 NOTE — PLAN OF CARE
Pt alert and oriented. Vss on room air. Pt denies cardiac signs/symptoms. Last took warfarin this am 9/5. Prepped and ready for cardiac procedure. Daughter and  in room with patient visiting.                       Lynne Martinez  Obstetrics and Gynecology  [address] [place] [address]  Phone: [phone]  Fax: [phone]  Follow Up Time: 1 month

## 2025-03-15 ENCOUNTER — HEALTH MAINTENANCE LETTER (OUTPATIENT)
Age: 85
End: 2025-03-15

## 2025-04-11 ENCOUNTER — HOSPITAL ENCOUNTER (OUTPATIENT)
Dept: CT IMAGING | Facility: CLINIC | Age: 85
Discharge: HOME | End: 2025-04-11
Attending: INTERNAL MEDICINE | Admitting: INTERNAL MEDICINE
Payer: COMMERCIAL

## 2025-04-11 DIAGNOSIS — Z95.818 PRESENCE OF WATCHMAN LEFT ATRIAL APPENDAGE CLOSURE DEVICE: ICD-10-CM

## 2025-04-11 DIAGNOSIS — I48.19 PERSISTENT ATRIAL FIBRILLATION (H): ICD-10-CM

## 2025-04-11 LAB
BSA FOR ECHO PROCEDURE: 0 M2
CREAT BLD-MCNC: 1 MG/DL (ref 0.5–1)
EGFRCR SERPLBLD CKD-EPI 2021: 55 ML/MIN/1.73M2

## 2025-04-11 PROCEDURE — 75572 CT HRT W/3D IMAGE: CPT | Mod: 26 | Performed by: INTERNAL MEDICINE

## 2025-04-11 PROCEDURE — 250N000011 HC RX IP 250 OP 636: Performed by: INTERNAL MEDICINE

## 2025-04-11 PROCEDURE — 75572 CT HRT W/3D IMAGE: CPT

## 2025-04-11 PROCEDURE — 82565 ASSAY OF CREATININE: CPT

## 2025-04-11 RX ORDER — IOPAMIDOL 755 MG/ML
120 INJECTION, SOLUTION INTRAVASCULAR ONCE
Status: COMPLETED | OUTPATIENT
Start: 2025-04-11 | End: 2025-04-11

## 2025-04-11 RX ADMIN — IOPAMIDOL 120 ML: 755 INJECTION, SOLUTION INTRAVENOUS at 13:13

## 2025-04-14 ENCOUNTER — OFFICE VISIT (OUTPATIENT)
Dept: CARDIOLOGY | Facility: CLINIC | Age: 85
End: 2025-04-14
Payer: COMMERCIAL

## 2025-04-14 VITALS
SYSTOLIC BLOOD PRESSURE: 118 MMHG | DIASTOLIC BLOOD PRESSURE: 80 MMHG | HEART RATE: 96 BPM | WEIGHT: 149 LBS | BODY MASS INDEX: 28.15 KG/M2 | RESPIRATION RATE: 16 BRPM

## 2025-04-14 DIAGNOSIS — I48.19 PERSISTENT ATRIAL FIBRILLATION (H): Primary | ICD-10-CM

## 2025-04-14 DIAGNOSIS — I10 ESSENTIAL HYPERTENSION: ICD-10-CM

## 2025-04-14 DIAGNOSIS — I48.92 ATRIAL FLUTTER, UNSPECIFIED TYPE (H): ICD-10-CM

## 2025-04-14 LAB
ATRIAL RATE - MUSE: 134 BPM
DIASTOLIC BLOOD PRESSURE - MUSE: NORMAL MMHG
INTERPRETATION ECG - MUSE: NORMAL
P AXIS - MUSE: NORMAL DEGREES
PR INTERVAL - MUSE: 152 MS
QRS DURATION - MUSE: 90 MS
QT - MUSE: 320 MS
QTC - MUSE: 477 MS
R AXIS - MUSE: 9 DEGREES
SYSTOLIC BLOOD PRESSURE - MUSE: NORMAL MMHG
T AXIS - MUSE: 10 DEGREES
VENTRICULAR RATE- MUSE: 134 BPM

## 2025-04-14 PROCEDURE — 99214 OFFICE O/P EST MOD 30 MIN: CPT | Performed by: PHYSICIAN ASSISTANT

## 2025-04-14 PROCEDURE — 93000 ELECTROCARDIOGRAM COMPLETE: CPT | Performed by: INTERNAL MEDICINE

## 2025-04-14 PROCEDURE — G2211 COMPLEX E/M VISIT ADD ON: HCPCS | Performed by: PHYSICIAN ASSISTANT

## 2025-04-14 PROCEDURE — 3078F DIAST BP <80 MM HG: CPT | Performed by: PHYSICIAN ASSISTANT

## 2025-04-14 PROCEDURE — 3074F SYST BP LT 130 MM HG: CPT | Performed by: PHYSICIAN ASSISTANT

## 2025-04-14 RX ORDER — METOPROLOL SUCCINATE 50 MG/1
100 TABLET, EXTENDED RELEASE ORAL DAILY
COMMUNITY
Start: 2025-04-09

## 2025-04-14 RX ORDER — TELMISARTAN 40 MG/1
40 TABLET ORAL DAILY
COMMUNITY
Start: 2025-01-14 | End: 2026-01-14

## 2025-04-14 RX ORDER — LANOLIN ALCOHOL/MO/W.PET/CERES
1000 CREAM (GRAM) TOPICAL DAILY
COMMUNITY

## 2025-04-14 NOTE — PROGRESS NOTES
HEART CARE ENCOUNTER NOTE       [facility]  [phone]      Assessment/Recommendations   1.  Persistent atrial fibrillation: status post LAAO with 27 mm Watchman FLX device on 4/10/2024.  She should continue aspirin 81 mg daily indefinitely.  Her 1 year CT scan showed a well-seated device, no DRT or leak.  We will see her again in about 1 year for watchman follow-up.    Status post multiple ablations recent September 2023. Her rates are elevated today. EKG ?atrial flutter vs atrial tachycardia.  She reports her metoprolol was increased from 50 mg to 75 mg last week by her PCP.  Will increase her metoprolol succinate to 100 mg daily.  She should see EP within the next week.    MODIFIED CHARLOTTE SCALE   Timepoint: 1yr Post-LAAC    Previous score: 0    Score Description   0 No symptoms at all   1 No significant disability despite symptoms; able to carry out all usual duties and activities   2 Slight disability; unable to carry out all previous activities, but able to look after own affairs without assistance   3 Moderate disability; requiring some help, but able to walk without assistance   4 Moderately severe disability; unable to walk without assistance and unable to attend to own bodily needs without assistance   5 Severe disability; bedridden, incontinent and requiring constant nursing care and attention   6 Dead    Total score (0 - 6):  0    Change in score if s/p LAAC? No     2.  Hypertension -controlled on current regimen    The longitudinal plan of care for the diagnosis(es)/condition(s) as documented were addressed during this visit. Due to the added complexity in care, I will continue to support Ellyn in the subsequent management and with ongoing continuity of care.        History of Present Illness/Subjective    Yvonne Fatima is a 85 year old female who comes in today for 1 year follow-up after watchman implant.    Yvonne Fatima has a past history of persistent atrial fibrillation,  atrial flutter, hypertension, dyslipidemia, chronic kidney disease stage III, type 2 diabetes mellitus, obstructive sleep apnea, history of TIA.     She is currently taking aspirin 81 mg daily.  She denies stroke since watchman implant.  She reports that her heart rate was elevated during a visit with her primary care provider last week.  She reports her metoprolol was increased from 50 mg to 75 mg.  She mostly notes feeling fatigue.  She denies having any chest discomfort, shortness of breath, palpitations, leg swelling, orthopnea, PND, dizziness, lightheadedness, presyncope or syncope.    Medical, surgical, family, social history, and medications were reviewed and updated as necessary.    ECHO results (from 4/10/2024):  Interpretation Summary     Structural HALI for Left Atrial Appendage Occlusion Device     Pre-Device:  1. Normal left ventricular size and systolic function. LVEF: 60-65%  2. No left atrial thrombus or spontaneous contrast. Severe left atrial  enlargement.  3. No ASD or PFO by color flow.  4. No pericardial effusion.     Post Device:  1. Well-seated PINNACLE FLXÂ  Device in left atrial appendage by 2D and 3D  imaging. No color doppler evidence of flow around device at ostium insertion  before or after device release. No change in mitral valve function. No  thrombus noted on device, LA or JENNIFER.     JENNIFER device measurements:  Device compression diameter:  Pre-deployment.  0Â : 23.8 mm  45Â : 20 mm  90Â : 20 mm  135 Â : 20.6 mm     Post-deployment  0Â : 19 mm  45Â : 17.4 mm  90Â : 20 mm  135 Â : 19 mm     2. Normal left ventricular size and systolic function. LVEF: 60-65%  3. Small ASD with unidirectional flow (left to right) by color flow doppler.  4. No post procedural pericardial effusion.      EKG:  ? Atrial flutter versus atrial tachycardia     Physical Examination Review of Systems   Vitals: /62 (BP Location: Right arm, Patient Position: Sitting, Cuff Size: Adult Large)   Pulse 96   Resp 16    Wt 67.6 kg (149 lb)   BMI 28.15 kg/m    BMI= Body mass index is 28.15 kg/m .  Wt Readings from Last 3 Encounters:   04/14/25 67.6 kg (149 lb)   10/16/24 65.3 kg (144 lb)   06/04/24 64.9 kg (143 lb)       General Appearance:   Alert, cooperative and in no acute distress   ENT/Mouth: membranes moist, no oral lesions or bleeding gums.      EYES:  no scleral icterus, normal conjunctivae   Neck: Thyroid not visualized   Chest/Lungs:   lungs are clear to auscultation, no rales or wheezing   Cardiovascular:   Fast, regular . Normal first and second heart sounds with no murmurs, rubs or gallops; no edema bilaterally    Abdomen:  Soft and nontender.   Extremities: no cyanosis or clubbing   Skin: no xanthelasma, warm.    Neurologic: normal gait, normal  bilateral, no tremors   Psychiatric: Normal mood and affect       Please refer above for cardiac ROS details.      Medical History  Surgical History Family History Social History   Past Medical History:   Diagnosis Date    Atrial fibrillation (H)     [id number] score of 7-on chronic warfarin Previously failed Sotalol and Multaq therapy PVI 3/11 Repeat PVI 7/11  Recurrent A fib, most recent CV Feb 2013 Rx sotalol     Atrial flutter (H)     Atypical flutter with RVR after second PVI in July 2011      Esophageal reflux     Created by Conversion     Essential hypertension          Status post catheter ablation of atrial fibrillation 6/3/2019    PVI 3/11(PVI/Cryo + CTI line) Repeat PVI 7/11 (PVI/RF + CFE + Roof line + GREGORIO line)     Past Surgical History:   Procedure Laterality Date    CV LEFT ATRIAL APPENDAGE CLOSURE Right 4/10/2024    Procedure: Left Atrial Appendage Closure;  Surgeon: Danyel Moon MD;  Location: [facility]    EP ABLATION PULMONARY VEIN ISOLATION N/A 9/6/2023    Procedure: Ablation Atrial Fibrillation;  Surgeon: Timur Corona MD;  Location: [facility]    HC REMOVE TONSILS/ADENOIDS,<13 Y/O      Description: Tonsillectomy With  Adenoidectomy;  Recorded: 10/17/2012;    HC REVISE MEDIAN N/CARPAL TUNNEL SURG      Description: Neuroplasty Decompression Median Nerve At Carpal Tunnel;  Recorded: 10/17/2012;    NC ABLATE HEART DYSRHYTHM FOCUS      Description: Catheter Ablation Atrial Fibrillation;  Recorded: 2013;  Comments: PVI 3-7-2011 (PVI/cryo + CTI line); ; Repeat PVI 2011 (PVI/RF + CFE + Roof line + GREGORIO line)    NC ABLATE HEART DYSRHYTHM FOCUS      Description: Catheter Ablation Atrial Fibrillation;  Recorded: 2014;  Comments: PVI 3-7-2011 (PVI/cryo + CTI line); ; Repeat PVI 2011 (PVI/RF + CFE + Roof line + GREGORIO line)    NC CARDIOVERSION ELECTIVE ARRHYTHMIA EXTERNAL       Fib 10/17/12; ; Flutter 2/15/13; AFl 2019     Family History   Problem Relation Age of Onset    Aneurysm Mother         brain    Emphysema Father     Lung Cancer Sister     Hypertension Brother     Lung Cancer Sister     Lung Cancer Sister     Breast Cancer Sister     Social History     Socioeconomic History    Marital status:      Spouse name: Not on file    Number of children: 3    Years of education: Not on file    Highest education level: Not on file   Occupational History    Not on file   Tobacco Use    Smoking status: Former     Current packs/day: 0.00     Types: Cigarettes     Quit date: 1962     Years since quittin.0    Smokeless tobacco: Never   Vaping Use    Vaping status: Never Used   Substance and Sexual Activity    Alcohol use: Yes     Comment: Alcoholic Drinks/day: 6 per month    Drug use: No    Sexual activity: Yes     Partners: Male   Other Topics Concern    Not on file   Social History Narrative    Not on file     Social Drivers of Health     Financial Resource Strain: Not on file   Food Insecurity: No Food Insecurity (2024)    [facility]    Hunger Vital Sign     Worried About Running Out of Food in the Last Year: Never true     Ran Out of Food in the Last Year: Never true   Transportation  Needs: No Transportation Needs (5/31/2024)    Received from [facility]    PRAPARE - Transportation     Lack of Transportation (Medical): No     Lack of Transportation (Non-Medical): No   Physical Activity: Not on file   Stress: Not on file   Social Connections: Not on file   Interpersonal Safety: Unknown (5/30/2024)    Received from [facility]    Humiliation, Afraid, Rape, and Kick questionnaire     Fear of Current or Ex-Partner: Not on file     Emotionally Abused: Not on file     Physically Abused: No     Sexually Abused: No   Housing Stability: Unknown (5/31/2024)    Received from [facility]    Housing Stability Vital Sign     Unable to Pay for Housing in the Last Year: No     Number of Places Lived in the Last Year: Not on file     Unstable Housing in the Last Year: No          Medications  Allergies   Current Outpatient Medications   Medication Sig Dispense Refill    allopurinol (ZYLOPRIM) 100 MG tablet [ALLOPURINOL (ZYLOPRIM) 100 MG TABLET] Take 100 mg by mouth 2 (two) times a day.             aspirin 81 MG EC tablet Take 1 tablet (81 mg) by mouth daily      atorvastatin (LIPITOR) 40 MG tablet Take 1 tablet by mouth daily      BD SUELLEN U/F 32G X 4 MM insulin pen needle       cholecalciferol 50 MCG (2000 UT) CAPS Take 2,000 Units by mouth daily      cyanocobalamin (VITAMIN B-12) 1000 MCG tablet Take 1,000 mcg by mouth daily.      insulin glargine (LANTUS PEN) 100 UNIT/ML pen Inject 24 Units subcutaneously every morning.      Lancets (ONETOUCH DELICA PLUS [id number]) MISC Test once daily varying time,      metFORMIN (GLUCOPHAGE XR) 500 MG 24 hr tablet Take 500 mg by mouth daily      metoprolol succinate ER (TOPROL XL) 25 MG 24 hr tablet Take 25 mg by mouth daily. Along with 50 mg = 75 mg QD      metoprolol succinate ER (TOPROL XL) 50 MG 24 hr tablet Take 50 mg by mouth daily. Along with 25 mg = 75 mg QD      NOVOLOG FLEXPEN 100 UNIT/ML soln INJECT THREE TIMES DAILY BEFORE MEALS: 1 UNIT IF GLUCOSE IS  "140-170, 2 UNITS -200, 3 UNITS -230, 4 UNITS -260, 5 UNITS -290, 6 UNITS IF >290, MAX DOSE OF 18 UNITS DAILY.*      ONETOUCH VERIO IQ test strip       pregabalin (LYRICA) 75 MG capsule Take 75 mg by mouth      telmisartan (MICARDIS) 40 MG tablet Take 40 mg by mouth daily.      insulin degludec (TRESIBA FLEXTOUCH) 100 UNIT/ML pen Inject 20 Units Subcutaneous (Patient not taking: Reported on 4/14/2025)      potassium chloride (KLOR-CON) 10 MEQ CR tablet Take 20 mEq by mouth 2 times daily. (Patient not taking: Reported on 4/14/2025)      semaglutide (OZEMPIC) 2 MG/3ML pen Inject 0.5 mg subcutaneously. (Patient not taking: Reported on 4/14/2025)      spironolactone-hydrochlorothiazide (ALDACTAZIDE) 25-25 mg tablet Take by mouth daily. Patient takes one-half tablet daily. (Patient not taking: Reported on 4/14/2025)      Allergies   Allergen Reactions    Pregabalin Other (See Comments)     Foggy thinking    Ace Inhibitors Cough    Empagliflozin Other (See Comments)     weakness    Irbesartan Other (See Comments)     exhaustion    Metformin GI Disturbance     2013.  Retry 2016- gastrointestinal intolerance again    Pioglitazone Other (See Comments)     Hand leg and facial swelling    Semaglutide(0.25 Or 0.5mg-Dos) GI Disturbance         Lab Results    Chemistry/lipid CBC Cardiac Enzymes/BNP/TSH/INR   No results for input(s): \"CHOL\", \"HDL\", \"LDL\", \"TRIG\", \"CHOLHDLRATIO\" in the last 82342 hours.  No results for input(s): \"LDL\" in the last 13096 hours.  Recent Labs   Lab Test 04/11/25  1311 04/10/24  1253 04/10/24  0716 04/08/24  1601   NA  --   --   --  137   POTASSIUM  --   --   --  4.3   CHLORIDE  --   --   --  101   CO2  --   --   --  22   GLC  --  225*   < > 332*   BUN  --   --   --  23.2*   CR 1.0  --    < > 1.00*   GFRESTIMATED 55*  --    < > 55*   HOLLIS  --   --   --  9.8    < > = values in this interval not displayed.     Recent Labs   Lab Test 04/11/25  1311 04/10/24  1016 04/08/24  1601   CR " "1.0 0.84 1.00*     No results for input(s): \"A1C\" in the last 87936 hours. Recent Labs   Lab Test 04/10/24  1016 04/08/24  1601   WBC  --  8.6   HGB 12.3 12.6   HCT  --  38.9   MCV  --  89   PLT  --  256     Recent Labs   Lab Test 04/10/24  1016 04/08/24  1601 09/06/23  1028   HGB 12.3 12.6 13.1    No results for input(s): \"TROPONINI\" in the last 41840 hours.  No results for input(s): \"BNP\", \"NTBNPI\", \"NTBNP\" in the last 23917 hours.  No results for input(s): \"TSH\" in the last 73393 hours.  Recent Labs   Lab Test 04/10/24  0716 04/08/24  1601 09/15/23  0000   INR 2.69* 2.87* 2.6*        30 minutes spent on the date of encounter doing education, chart prep/review, review of outside records, review of test results, interpretation with above tests, patient visit, and documentation.      This note has been dictated using voice recognition software. Any grammatical or context distortions are unintentional and inherent to the software.    Millie Randall PA-C  Structural Heart Program  [facility]   "

## 2025-04-14 NOTE — PATIENT INSTRUCTIONS
Yvonne Fatima,    It was a pleasure to see you today in the clinic regarding your Watchman follow-up.     My recommendations after this visit include:     - increase metoprolol to 100 mg daily   - follow-up with EP within the next week   - take aspirin 81 mg daily indefinitely          If you have questions or concerns, please call using the numbers below:    After Hours/Scheduling  [phone]    Otherwise you can dial the nurse directly at:    Hayley Downs RN  [phone]    Millie Randall PA-C  Structural Heart Program  [facility]

## 2025-04-14 NOTE — LETTER
4/14/2025    Rossy Yoon MD  [facility] [address]  [facility] [address]    RE: Yvonne Fatima       Dear Colleague,     I had the pleasure of seeing Yvonne Fatima in the [facility].  HEART CARE ENCOUNTER NOTE       [facility]  [phone]      Assessment/Recommendations   1.  Persistent atrial fibrillation: status post LAAO with 27 mm Watchman FLX device on 4/10/2024.  She should continue aspirin 81 mg daily indefinitely.  Her 1 year CT scan showed a well-seated device, no DRT or leak.  We will see her again in about 1 year for watchman follow-up.    Status post multiple ablations recent September 2023. Her rates are elevated today. EKG ?atrial flutter vs atrial tachycardia.  She reports her metoprolol was increased from 50 mg to 75 mg last week by her PCP.  Will increase her metoprolol succinate to 100 mg daily.  She should see EP within the next week.    MODIFIED CHARLOTTE SCALE   Timepoint: 1yr Post-LAAC    Previous score: 0    Score Description   0 No symptoms at all   1 No significant disability despite symptoms; able to carry out all usual duties and activities   2 Slight disability; unable to carry out all previous activities, but able to look after own affairs without assistance   3 Moderate disability; requiring some help, but able to walk without assistance   4 Moderately severe disability; unable to walk without assistance and unable to attend to own bodily needs without assistance   5 Severe disability; bedridden, incontinent and requiring constant nursing care and attention   6 Dead    Total score (0 - 6):  0    Change in score if s/p LAAC? No     2.  Hypertension -controlled on current regimen    The longitudinal plan of care for the diagnosis(es)/condition(s) as documented were addressed during this visit. Due to the added complexity in care, I will continue to support Ellyn in the subsequent management and with ongoing  continuity of care.        History of Present Illness/Subjective    Yvonne Fatima is a 85 year old female who comes in today for 1 year follow-up after watchman implant.    Yvonne Fatima has a past history of persistent atrial fibrillation, atrial flutter, hypertension, dyslipidemia, chronic kidney disease stage III, type 2 diabetes mellitus, obstructive sleep apnea, history of TIA.     She is currently taking aspirin 81 mg daily.  She denies stroke since watchman implant.  She reports that her heart rate was elevated during a visit with her primary care provider last week.  She reports her metoprolol was increased from 50 mg to 75 mg.  She mostly notes feeling fatigue.  She denies having any chest discomfort, shortness of breath, palpitations, leg swelling, orthopnea, PND, dizziness, lightheadedness, presyncope or syncope.    Medical, surgical, family, social history, and medications were reviewed and updated as necessary.    ECHO results (from 4/10/2024):  Interpretation Summary     Structural HALI for Left Atrial Appendage Occlusion Device     Pre-Device:  1. Normal left ventricular size and systolic function. LVEF: 60-65%  2. No left atrial thrombus or spontaneous contrast. Severe left atrial  enlargement.  3. No ASD or PFO by color flow.  4. No pericardial effusion.     Post Device:  1. Well-seated PINNACLE FLXÂ  Device in left atrial appendage by 2D and 3D  imaging. No color doppler evidence of flow around device at ostium insertion  before or after device release. No change in mitral valve function. No  thrombus noted on device, LA or JENNIFER.     JENNIFER device measurements:  Device compression diameter:  Pre-deployment.  0Â : 23.8 mm  45Â : 20 mm  90Â : 20 mm  135 Â : 20.6 mm     Post-deployment  0Â : 19 mm  45Â : 17.4 mm  90Â : 20 mm  135 Â : 19 mm     2. Normal left ventricular size and systolic function. LVEF: 60-65%  3. Small ASD with unidirectional flow (left to right) by color flow doppler.  4. No post  procedural pericardial effusion.      EKG:  ? Atrial flutter versus atrial tachycardia     Physical Examination Review of Systems   Vitals: /62 (BP Location: Right arm, Patient Position: Sitting, Cuff Size: Adult Large)   Pulse 96   Resp 16   Wt 67.6 kg (149 lb)   BMI 28.15 kg/m    BMI= Body mass index is 28.15 kg/m .  Wt Readings from Last 3 Encounters:   04/14/25 67.6 kg (149 lb)   10/16/24 65.3 kg (144 lb)   06/04/24 64.9 kg (143 lb)       General Appearance:   Alert, cooperative and in no acute distress   ENT/Mouth: membranes moist, no oral lesions or bleeding gums.      EYES:  no scleral icterus, normal conjunctivae   Neck: Thyroid not visualized   Chest/Lungs:   lungs are clear to auscultation, no rales or wheezing   Cardiovascular:   Fast, regular . Normal first and second heart sounds with no murmurs, rubs or gallops; no edema bilaterally    Abdomen:  Soft and nontender.   Extremities: no cyanosis or clubbing   Skin: no xanthelasma, warm.    Neurologic: normal gait, normal  bilateral, no tremors   Psychiatric: Normal mood and affect       Please refer above for cardiac ROS details.      Medical History  Surgical History Family History Social History   Past Medical History:   Diagnosis Date     Atrial fibrillation (H)     [id number] score of 7-on chronic warfarin Previously failed Sotalol and Multaq therapy PVI 3/11 Repeat PVI 7/11  Recurrent A fib, most recent CV Feb 2013 Rx sotalol      Atrial flutter (H)     Atypical flutter with RVR after second PVI in July 2011       Esophageal reflux     Created by Conversion      Essential hypertension           Status post catheter ablation of atrial fibrillation 6/3/2019    PVI 3/11(PVI/Cryo + CTI line) Repeat PVI 7/11 (PVI/RF + CFE + Roof line + GREGORIO line)     Past Surgical History:   Procedure Laterality Date     CV LEFT ATRIAL APPENDAGE CLOSURE Right 4/10/2024    Procedure: Left Atrial Appendage Closure;  Surgeon: Danyel Moon MD;  Location:   [facility] CV     EP ABLATION PULMONARY VEIN ISOLATION N/A 2023    Procedure: Ablation Atrial Fibrillation;  Surgeon: Timur Corona MD;  Location: [facility]     HC REMOVE TONSILS/ADENOIDS,<11 Y/O      Description: Tonsillectomy With Adenoidectomy;  Recorded: 10/17/2012;     HC REVISE MEDIAN N/CARPAL TUNNEL SURG      Description: Neuroplasty Decompression Median Nerve At Carpal Tunnel;  Recorded: 10/17/2012;     SC ABLATE HEART DYSRHYTHM FOCUS      Description: Catheter Ablation Atrial Fibrillation;  Recorded: 2013;  Comments: PVI 3-7-2011 (PVI/cryo + CTI line); ; Repeat PVI 2011 (PVI/RF + CFE + Roof line + GREGORIO line)     SC ABLATE HEART DYSRHYTHM FOCUS      Description: Catheter Ablation Atrial Fibrillation;  Recorded: 2014;  Comments: PVI 3-7-2011 (PVI/cryo + CTI line); ; Repeat PVI 2011 (PVI/RF + CFE + Roof line + GREGORIO line)     SC CARDIOVERSION ELECTIVE ARRHYTHMIA EXTERNAL       Fib 10/17/12; ; Flutter 2/15/13; AFl 2019     Family History   Problem Relation Age of Onset     Aneurysm Mother         brain     Emphysema Father      Lung Cancer Sister      Hypertension Brother      Lung Cancer Sister      Lung Cancer Sister      Breast Cancer Sister     Social History     Socioeconomic History     Marital status:      Spouse name: Not on file     Number of children: 3     Years of education: Not on file     Highest education level: Not on file   Occupational History     Not on file   Tobacco Use     Smoking status: Former     Current packs/day: 0.00     Types: Cigarettes     Quit date: 1962     Years since quittin.0     Smokeless tobacco: Never   Vaping Use     Vaping status: Never Used   Substance and Sexual Activity     Alcohol use: Yes     Comment: Alcoholic Drinks/day: 6 per month     Drug use: No     Sexual activity: Yes     Partners: Male   Other Topics Concern     Not on file   Social History Narrative     Not on file     Social Drivers of Health      Financial Resource Strain: Not on file   Food Insecurity: No Food Insecurity (5/31/2024)    Received from [employer]    Hunger Vital Sign      Worried About Running Out of Food in the Last Year: Never true      Ran Out of Food in the Last Year: Never true   Transportation Needs: No Transportation Needs (5/31/2024)    Received from [facility]    PRAPARE - Transportation      Lack of Transportation (Medical): No      Lack of Transportation (Non-Medical): No   Physical Activity: Not on file   Stress: Not on file   Social Connections: Not on file   Interpersonal Safety: Unknown (5/30/2024)    Received from [facility]    Humiliation, Afraid, Rape, and Kick questionnaire      Fear of Current or Ex-Partner: Not on file      Emotionally Abused: Not on file      Physically Abused: No      Sexually Abused: No   Housing Stability: Unknown (5/31/2024)    Received from [employer]    Housing Stability Vital Sign      Unable to Pay for Housing in the Last Year: No      Number of Places Lived in the Last Year: Not on file      Unstable Housing in the Last Year: No          Medications  Allergies   Current Outpatient Medications   Medication Sig Dispense Refill     allopurinol (ZYLOPRIM) 100 MG tablet [ALLOPURINOL (ZYLOPRIM) 100 MG TABLET] Take 100 mg by mouth 2 (two) times a day.              aspirin 81 MG EC tablet Take 1 tablet (81 mg) by mouth daily       atorvastatin (LIPITOR) 40 MG tablet Take 1 tablet by mouth daily       BD SUELLEN U/F 32G X 4 MM insulin pen needle        cholecalciferol 50 MCG (2000 UT) CAPS Take 2,000 Units by mouth daily       cyanocobalamin (VITAMIN B-12) 1000 MCG tablet Take 1,000 mcg by mouth daily.       insulin glargine (LANTUS PEN) 100 UNIT/ML pen Inject 24 Units subcutaneously every morning.       Lancets (ONETOUCH DELICA PLUS [id number]) MISC Test once daily varying time,       metFORMIN (GLUCOPHAGE XR) 500 MG 24 hr tablet Take 500 mg by mouth daily       metoprolol succinate ER  "(TOPROL XL) 25 MG 24 hr tablet Take 25 mg by mouth daily. Along with 50 mg = 75 mg QD       metoprolol succinate ER (TOPROL XL) 50 MG 24 hr tablet Take 50 mg by mouth daily. Along with 25 mg = 75 mg QD       NOVOLOG FLEXPEN 100 UNIT/ML soln INJECT THREE TIMES DAILY BEFORE MEALS: 1 UNIT IF GLUCOSE -170, 2 UNITS -200, 3 UNITS -230, 4 UNITS -260, 5 UNITS -290, 6 UNITS IF >290, MAX DOSE OF 18 UNITS DAILY.*       ONETOUCH VERIO IQ test strip        pregabalin (LYRICA) 75 MG capsule Take 75 mg by mouth       telmisartan (MICARDIS) 40 MG tablet Take 40 mg by mouth daily.       insulin degludec (TRESIBA FLEXTOUCH) 100 UNIT/ML pen Inject 20 Units Subcutaneous (Patient not taking: Reported on 4/14/2025)       potassium chloride (KLOR-CON) 10 MEQ CR tablet Take 20 mEq by mouth 2 times daily. (Patient not taking: Reported on 4/14/2025)       semaglutide (OZEMPIC) 2 MG/3ML pen Inject 0.5 mg subcutaneously. (Patient not taking: Reported on 4/14/2025)       spironolactone-hydrochlorothiazide (ALDACTAZIDE) 25-25 mg tablet Take by mouth daily. Patient takes one-half tablet daily. (Patient not taking: Reported on 4/14/2025)      Allergies   Allergen Reactions     Pregabalin Other (See Comments)     Foggy thinking     Ace Inhibitors Cough     Empagliflozin Other (See Comments)     weakness     Irbesartan Other (See Comments)     exhaustion     Metformin GI Disturbance     2013.  Retry 2016- gastrointestinal intolerance again     Pioglitazone Other (See Comments)     Hand leg and facial swelling     Semaglutide(0.25 Or 0.5mg-Dos) GI Disturbance         Lab Results    Chemistry/lipid CBC Cardiac Enzymes/BNP/TSH/INR   No results for input(s): \"CHOL\", \"HDL\", \"LDL\", \"TRIG\", \"CHOLHDLRATIO\" in the last 75999 hours.  No results for input(s): \"LDL\" in the last 82569 hours.  Recent Labs   Lab Test 04/11/25  1311 04/10/24  1253 04/10/24  0716 04/08/24  1601   NA  --   --   --  137   POTASSIUM  --   --   --  4.3 " "  CHLORIDE  --   --   --  101   CO2  --   --   --  22   GLC  --  225*   < > 332*   BUN  --   --   --  23.2*   CR 1.0  --    < > 1.00*   GFRESTIMATED 55*  --    < > 55*   HOLLIS  --   --   --  9.8    < > = values in this interval not displayed.     Recent Labs   Lab Test 04/11/25  1311 04/10/24  1016 04/08/24  1601   CR 1.0 0.84 1.00*     No results for input(s): \"A1C\" in the last 64241 hours. Recent Labs   Lab Test 04/10/24  1016 04/08/24  1601   WBC  --  8.6   HGB 12.3 12.6   HCT  --  38.9   MCV  --  89   PLT  --  256     Recent Labs   Lab Test 04/10/24  1016 04/08/24  1601 09/06/23  1028   HGB 12.3 12.6 13.1    No results for input(s): \"TROPONINI\" in the last 95482 hours.  No results for input(s): \"BNP\", \"NTBNPI\", \"NTBNP\" in the last 22946 hours.  No results for input(s): \"TSH\" in the last 02572 hours.  Recent Labs   Lab Test 04/10/24  0716 04/08/24  1601 09/15/23  0000   INR 2.69* 2.87* 2.6*        30 minutes spent on the date of encounter doing education, chart prep/review, review of outside records, review of test results, interpretation with above tests, patient visit, and documentation.      This note has been dictated using voice recognition software. Any grammatical or context distortions are unintentional and inherent to the software.    Millie Randall PA-C  Structural Heart Program  [facility]       Thank you for allowing me to participate in the care of your patient.      Sincerely,     Millie Randall PA-C     [facility]  cc:   Danyel Moon MD  [address] [facility] [address]  [place] [address]      "

## 2025-04-15 ENCOUNTER — TELEPHONE (OUTPATIENT)
Dept: CARDIOLOGY | Facility: CLINIC | Age: 85
End: 2025-04-15
Payer: COMMERCIAL

## 2025-04-15 NOTE — TELEPHONE ENCOUNTER
JESSICAM for pt to call [phone] regarding.  4/14/25 office visit note reviewed, note says to follow-up with EP within the week.    Pt said she did increase her metoprolol dose.  Educated on medication and that she will see a change in HR, but not immediately.  Advised pt to see more urgent care if she becomes short of breath, or becomes dizzy or lightheaded, has a weight gain or leg swelling.  Pt verbalized understanding and had no additional questions.  -ral

## 2025-04-15 NOTE — TELEPHONE ENCOUNTER
Health Call Center    Phone Message    May a detailed message be left on voicemail: yes     Reason for Call: Symptoms or Concerns     If patient has red-flag symptoms, warm transfer to triage line    Current symptom or concern: elevated heart rate of 130.Patient saw Millie yesterday and patient would like to know if its okay to wait a week until her next appointment.     Symptoms have been present for:  1-2 week(s)    Has patient previously been seen for this? Yes    By Millie:     Date: 4/14    Are there any new or worsening symptoms? Yes: uncertain, patient is concerned if she should wait for next appointment or be seen sooner for heart rate of 130.     Action Taken: Other: cardio    Travel Screening: Not Applicable     Date of Service:

## 2025-04-18 NOTE — PROGRESS NOTES
[facility]  Cardiac Electrophysiology  [address]  [place] [address]   Office: [phone]  Fax: [phone]     HEART CARE ELECTROPHYSIOLOGY FOLLOW UP    Primary Care: Rossy Yoon MD, MD      Assessment/Recommendations     Persistent atrial fibrillation, atrial flutter, focal atrial tachycardia: Status post catheter ablation of focal atrial tachycardia 9/6/2023; baseline isolation of PVI, roofline, GREGORIO line and CFE, with noted severely reduced LA voltage and <5% viable myocardium. She is in persistent atypical atrial flutter with poorly controlled ventricular response despite beta blocker uptitration, associated with activity intolerance. We had a lengthy discussion of rhythm management options including cardioversion with antiarrhythmic therapy and AV node ablation and device implant. She would prefer attempts at sinus rhythm with the former     Percutaneous left atrial appendage closure in situ: [id number] score of 7 for age >75, gender, HTN, DM, TIA; HAS BLED 3 for age >65, bleeding predisposition with history of gait instability and falls, CVA/TIA. Previously on warfarin.  Status post Watchman implant 4/10/2024. 1 year post implant CT 4/11/2025 unremarkable for peridevice flow or thrombus    SHAVON: consistent use of CPAP    Plan:  Start Eliquis 5 mg twice daily for stroke prophylaxis  Discontinue aspirin while on Eliquis  Outpatient DCCV   Continue metoprolol succinate 100 mg daily. Transition to sotalol 60 mg twice daily before or after cardioversion pending scheduling   Follow-up 1-2 months        History of Present Illness/Subjective    Yvonne Fatima is a 85 year old female with past medical history significant for persistent atrial fibrillation and atrial flutter with prior ablations 2011 and 2023, percutaneous left atrial appendage closure in situ 4/10/2024, nonobstructive CAD, HTN, T2DM, SHAVON, TIA, CKD, right breast cancer with partial mastectomy 2/2025,  "seen today for EP follow-up.  She saw her primary care physician for preop exam prior to cataract surgery earlier this month and was noted to be in atypical atrial flutter with rapid ventricular response for which her metoprolol was increased.  She \"wears out\" faster and needs more rest after activity recently. In hindsight she may have been more tired on and off over the preceding months though attributed this to aging and low blood sugars between meals.  There is also mention of \"junctional tachycardia\" around the time of a hospitalization last summer, though no documentation of such and she was back in sinus rhythm at time of follow-up 2024. She has not been more short of breath or noticed palpitations. She denies chest discomfort, orthopnea, lightheadedness/dizziness, pedal edema or syncope.        Data Review     Arrhythmia hx  Sx: Lightheadedness, weakness, general malaise  Dx/date: unknown AF diagnosis. Atypical AFL . Recurrences [id number]. Recurrent persistent atypical AFL 2025, though possibly paroxysmal around hospitalization 2024  AAD: prior use Multaq ~. Sotalol  - 2023  DCCV: 2019, 9/3/2021, 2023  Ablation: Cryoablation PVI and CTI 3/2011.  RF PVI, CFE, roofline and GREGORIO line 2011.  Focal AT (lateral LA); baseline PVI, roofline, GREGORIO and CFE isolation 2023 (Dr. Corona); severely reduced LA voltage, viable myocardium <5%  [id number] score of 7 for age >75, gender, HTN, DM, TIA; HAS BLED 3 for age >65, bleeding predisposition with history of gait instability and falls, CVA/TIA  OAC: Previously on warfarin.  Status post percutaneous left atrial appendage closure 4/10/2024    [id number]: Atypical  bpm  2025: Atypical  bpm  10/18/2023: SR 85 bpm, QRS 84 ms, QT/QTc 372/442 ms  2023: SR 83 bpm, QRS 94 ms, QT/QTc 426/500 ms  2023: Sinus rhythm 66 bpm, AV delay  ms, QRS 94 ms, QT/QTc 432/452 ms  2023: Sinus rhythm 50 bpm, brief " "second-degree AVB type II,  ms, QT/QTc 488/444 ms  6/16/2023: Atypical atrial flutter 105 bpm, QRS 84 ms, QT/QTc 370/489 ms  Personally reviewed.      TTE: 8/7/2023  The left ventricle is normal in size.  Left ventricular function is normal.The ejection fraction is 60-65%.  Left ventricular diastolic function is abnormal.  The right ventricle is mildly dilated.  The left atrium is moderately dilated. The right atrium is moderately dilated.  There is severe mitral annular calcification.  There is mild mitral stenosis.  There is moderate (2+) tricuspid regurgitation.  Right ventricular systolic pressure is elevated, consistent with mild  pulmonary hypertension.    I have reviewed and updated the patient's past medical history, allergy list and medication list.          Physical Examination   BMI= Body mass index is 28.27 kg/m .    Wt Readings from Last 3 Encounters:   04/21/25 67.9 kg (149 lb 9.6 oz)   04/14/25 67.6 kg (149 lb)   10/16/24 65.3 kg (144 lb)       Vitals: /74 (BP Location: Left arm, Patient Position: Sitting, Cuff Size: Adult Regular)   Pulse (!) 128   Resp 18   Ht 1.549 m (5' 1\")   Wt 67.9 kg (149 lb 9.6 oz)   BMI 28.27 kg/m    General   Appearance:   Alert and oriented, in no acute distress   HEENT:  Normocephalic and atraumatic   Neck: No JVP, carotid bruit or obvious thyromegaly   Lungs:   Respirations unlabored   Cardiovascular:   Rhythm is regular, rate is tachycardic. S1 and S2 are normal. No significant murmur is present. Lower extremities demonstrate no significant edema   Extremities: No cyanosis or clubbing   Skin: Skin is warm, dry, and otherwise intact   Neurologic: Gait not assessed. Mood and affect appropriate           Medical History  Surgical History Family History Social History   Past Medical History:   Diagnosis Date    Atrial fibrillation (H)     [id number] score of 7-on chronic warfarin Previously failed Sotalol and Multaq therapy PVI 3/11 Repeat PVI 7/11  " Recurrent A fib, most recent CV Feb 2013 Rx sotalol     Atrial flutter (H)     Atypical flutter with RVR after second PVI in July 2011      Esophageal reflux     Created by Conversion     Essential hypertension          Status post catheter ablation of atrial fibrillation 6/3/2019    PVI 3/11(PVI/Cryo + CTI line) Repeat PVI 7/11 (PVI/RF + CFE + Roof line + GREGORIO line)    Past Surgical History:   Procedure Laterality Date    CV LEFT ATRIAL APPENDAGE CLOSURE Right 4/10/2024    Procedure: Left Atrial Appendage Closure;  Surgeon: Danyel Moon MD;  Location: [facility] CV    EP ABLATION PULMONARY VEIN ISOLATION N/A 9/6/2023    Procedure: Ablation Atrial Fibrillation;  Surgeon: Timur Corona MD;  Location: [facility] CV    HC REMOVE TONSILS/ADENOIDS,<13 Y/O      Description: Tonsillectomy With Adenoidectomy;  Recorded: 10/17/2012;    HC REVISE MEDIAN N/CARPAL TUNNEL SURG      Description: Neuroplasty Decompression Median Nerve At Carpal Tunnel;  Recorded: 10/17/2012;    OK ABLATE HEART DYSRHYTHM FOCUS      Description: Catheter Ablation Atrial Fibrillation;  Recorded: 06/20/2013;  Comments: PVI 3-7-2011 (PVI/cryo + CTI line); ; Repeat PVI July 2011 (PVI/RF + CFE + Roof line + GREGORIO line)    OK ABLATE HEART DYSRHYTHM FOCUS      Description: Catheter Ablation Atrial Fibrillation;  Recorded: 08/05/2014;  Comments: PVI 3-7-2011 (PVI/cryo + CTI line); ; Repeat PVI July 2011 (PVI/RF + CFE + Roof line + GREGORIO line)    OK CARDIOVERSION ELECTIVE ARRHYTHMIA EXTERNAL       Fib 10/17/12; ; Flutter 2/15/13; AFl 11/11/2019    Family History   Problem Relation Age of Onset    Aneurysm Mother         brain    Emphysema Father     Lung Cancer Sister     Hypertension Brother     Lung Cancer Sister     Lung Cancer Sister     Breast Cancer Sister     Social History     Socioeconomic History    Marital status:      Spouse name: Not on file    Number of children: 3    Years of education: Not on file    Highest education  level: Not on file   Occupational History    Not on file   Tobacco Use    Smoking status: Former     Current packs/day: 0.00     Types: Cigarettes     Quit date: 1962     Years since quittin.0    Smokeless tobacco: Never   Vaping Use    Vaping status: Never Used   Substance and Sexual Activity    Alcohol use: Yes     Comment: Alcoholic Drinks/day: 6 per month    Drug use: No    Sexual activity: Yes     Partners: Male   Other Topics Concern    Not on file   Social History Narrative    Not on file     Social Drivers of Health     Financial Resource Strain: Not on file   Food Insecurity: No Food Insecurity (2024)    Received from [facility]    Hunger Vital Sign     Worried About Running Out of Food in the Last Year: Never true     Ran Out of Food in the Last Year: Never true   Transportation Needs: No Transportation Needs (2024)    Received from [facility]    PRAPARE - Transportation     Lack of Transportation (Medical): No     Lack of Transportation (Non-Medical): No   Physical Activity: Not on file   Stress: Not on file   Social Connections: Not on file   Interpersonal Safety: Unknown (2024)    Received from [facility]    Humiliation, Afraid, Rape, and Kick questionnaire     Fear of Current or Ex-Partner: Not on file     Emotionally Abused: Not on file     Physically Abused: No     Sexually Abused: No   Housing Stability: Unknown (2024)    Received from [facility]    Housing Stability Vital Sign     Unable to Pay for Housing in the Last Year: No     Number of Places Lived in the Last Year: Not on file     Unstable Housing in the Last Year: No          Medications  Allergies   Scheduled Meds:  Current Outpatient Medications   Medication Sig Dispense Refill    allopurinol (ZYLOPRIM) 100 MG tablet [ALLOPURINOL (ZYLOPRIM) 100 MG TABLET] Take 100 mg by mouth 2 (two) times a day.             aspirin 81 MG EC tablet Take 1 tablet (81 mg) by mouth daily      atorvastatin  (LIPITOR) 40 MG tablet Take 1 tablet by mouth daily      BD SUELLEN U/F 32G X 4 MM insulin pen needle       cholecalciferol 50 MCG (2000 UT) CAPS Take 2,000 Units by mouth daily      cyanocobalamin (VITAMIN B-12) 1000 MCG tablet Take 1,000 mcg by mouth daily.      insulin degludec (TRESIBA FLEXTOUCH) 100 UNIT/ML pen Inject 20 Units subcutaneously.      insulin glargine (LANTUS PEN) 100 UNIT/ML pen Inject 24 Units subcutaneously every morning.      Lancets (ONETOUCH DELICA PLUS [id number]) MISC Test once daily varying time,      metFORMIN (GLUCOPHAGE XR) 500 MG 24 hr tablet Take 500 mg by mouth daily      metoprolol succinate ER (TOPROL XL) 50 MG 24 hr tablet Take 100 mg by mouth daily.      NOVOLOG FLEXPEN 100 UNIT/ML soln INJECT THREE TIMES DAILY BEFORE MEALS: 1 UNIT IF GLUCOSE -170, 2 UNITS -200, 3 UNITS -230, 4 UNITS -260, 5 UNITS -290, 6 UNITS IF >290, MAX DOSE OF 18 UNITS DAILY.*      ONETOUCH VERIO IQ test strip       pregabalin (LYRICA) 75 MG capsule Take 75 mg by mouth      telmisartan (MICARDIS) 40 MG tablet Take 40 mg by mouth daily.      Allergies   Allergen Reactions    Pregabalin Other (See Comments)     Foggy thinking    Ace Inhibitors Cough    Empagliflozin Other (See Comments)     weakness    Irbesartan Other (See Comments)     exhaustion    Metformin GI Disturbance     2013.  Retry 2016- gastrointestinal intolerance again    Pioglitazone Other (See Comments)     Hand leg and facial swelling    Semaglutide(0.25 Or 0.5mg-Dos) GI Disturbance         Lab Results    Chemistry/lipid CBC Cardiac Enzymes/BNP/TSH/INR   Lab Results   Component Value Date    BUN 23.2 (H) 04/08/2024     04/08/2024    CO2 22 04/08/2024    Lab Results   Component Value Date    WBC 8.6 04/08/2024    HGB 12.3 04/10/2024    HCT 38.9 04/08/2024    MCV 89 04/08/2024     04/08/2024    @RESUFAST(BMP,CBC,BNP,TSH,  INR)@      60 minutes spent reviewing prior records (including documentation,  laboratory studies, cardiac testing/imaging), history and physical exam, planning, and subsequent documentation.     The longitudinal plan of care for the diagnosis(es)/condition(s) as documented were addressed during this visit. Due to the added complexity in care, I will continue to support Ellyn in the subsequent management and with ongoing continuity of care.      This note has been dictated using voice recognition software. Any grammatical, typographical, or context distortions are unintentional and inherent to the software.    Rajani Mcgrath, LONNY  Cardiac Electrophysiology  [facility]  Clinic and [phone]  Fax: [phone]  Electrophysiology Nurses: [phone]

## 2025-04-18 NOTE — H&P (VIEW-ONLY)
[facility]  Cardiac Electrophysiology  [address]  [place] [address]   Office: [phone]  Fax: [phone]     HEART CARE ELECTROPHYSIOLOGY FOLLOW UP    Primary Care: Rossy Yoon MD, MD      Assessment/Recommendations     Persistent atrial fibrillation, atrial flutter, focal atrial tachycardia: Status post catheter ablation of focal atrial tachycardia 9/6/2023; baseline isolation of PVI, roofline, GREGORIO line and CFE, with noted severely reduced LA voltage and <5% viable myocardium. She is in persistent atypical atrial flutter with poorly controlled ventricular response despite beta blocker uptitration, associated with activity intolerance. We had a lengthy discussion of rhythm management options including cardioversion with antiarrhythmic therapy and AV node ablation and device implant. She would prefer attempts at sinus rhythm with the former     Percutaneous left atrial appendage closure in situ: [id number] score of 7 for age >75, gender, HTN, DM, TIA; HAS BLED 3 for age >65, bleeding predisposition with history of gait instability and falls, CVA/TIA. Previously on warfarin.  Status post Watchman implant 4/10/2024. 1 year post implant CT 4/11/2025 unremarkable for peridevice flow or thrombus    SHAVON: consistent use of CPAP    Plan:  Start Eliquis 5 mg twice daily for stroke prophylaxis  Discontinue aspirin while on Eliquis  Outpatient DCCV   Continue metoprolol succinate 100 mg daily. Transition to sotalol 60 mg twice daily before or after cardioversion pending scheduling   Follow-up 1-2 months        History of Present Illness/Subjective    Yvonne Fatima is a 85 year old female with past medical history significant for persistent atrial fibrillation and atrial flutter with prior ablations 2011 and 2023, percutaneous left atrial appendage closure in situ 4/10/2024, nonobstructive CAD, HTN, T2DM, SHAVON, TIA, CKD, right breast cancer with partial mastectomy 2/2025,  "seen today for EP follow-up.  She saw her primary care physician for preop exam prior to cataract surgery earlier this month and was noted to be in atypical atrial flutter with rapid ventricular response for which her metoprolol was increased.  She \"wears out\" faster and needs more rest after activity recently. In hindsight she may have been more tired on and off over the preceding months though attributed this to aging and low blood sugars between meals.  There is also mention of \"junctional tachycardia\" around the time of a hospitalization last summer, though no documentation of such and she was back in sinus rhythm at time of follow-up 2024. She has not been more short of breath or noticed palpitations. She denies chest discomfort, orthopnea, lightheadedness/dizziness, pedal edema or syncope.        Data Review     Arrhythmia hx  Sx: Lightheadedness, weakness, general malaise  Dx/date: unknown AF diagnosis. Atypical AFL . Recurrences [id number]. Recurrent persistent atypical AFL 2025, though possibly paroxysmal around hospitalization 2024  AAD: prior use Multaq ~. Sotalol  - 2023  DCCV: 2019, 9/3/2021, 2023  Ablation: Cryoablation PVI and CTI 3/2011.  RF PVI, CFE, roofline and GREGORIO line 2011.  Focal AT (lateral LA); baseline PVI, roofline, GREGORIO and CFE isolation 2023 (Dr. Corona); severely reduced LA voltage, viable myocardium <5%  [id number] score of 7 for age >75, gender, HTN, DM, TIA; HAS BLED 3 for age >65, bleeding predisposition with history of gait instability and falls, CVA/TIA  OAC: Previously on warfarin.  Status post percutaneous left atrial appendage closure 4/10/2024    EK2025: Atypical  bpm  2025: Atypical  bpm  10/18/2023: SR 85 bpm, QRS 84 ms, QT/QTc 372/442 ms  2023: SR 83 bpm, QRS 94 ms, QT/QTc 426/500 ms  2023: Sinus rhythm 66 bpm, AV delay  ms, QRS 94 ms, QT/QTc 432/452 ms  2023: Sinus rhythm 50 bpm, brief " "second-degree AVB type II,  ms, QT/QTc 488/444 ms  6/16/2023: Atypical atrial flutter 105 bpm, QRS 84 ms, QT/QTc 370/489 ms  Personally reviewed.      TTE: 8/7/2023  The left ventricle is normal in size.  Left ventricular function is normal.The ejection fraction is 60-65%.  Left ventricular diastolic function is abnormal.  The right ventricle is mildly dilated.  The left atrium is moderately dilated. The right atrium is moderately dilated.  There is severe mitral annular calcification.  There is mild mitral stenosis.  There is moderate (2+) tricuspid regurgitation.  Right ventricular systolic pressure is elevated, consistent with mild  pulmonary hypertension.    I have reviewed and updated the patient's past medical history, allergy list and medication list.          Physical Examination   BMI= Body mass index is 28.27 kg/m .    Wt Readings from Last 3 Encounters:   04/21/25 67.9 kg (149 lb 9.6 oz)   04/14/25 67.6 kg (149 lb)   10/16/24 65.3 kg (144 lb)       Vitals: /74 (BP Location: Left arm, Patient Position: Sitting, Cuff Size: Adult Regular)   Pulse (!) 128   Resp 18   Ht 1.549 m (5' 1\")   Wt 67.9 kg (149 lb 9.6 oz)   BMI 28.27 kg/m    General   Appearance:   Alert and oriented, in no acute distress   HEENT:  Normocephalic and atraumatic   Neck: No JVP, carotid bruit or obvious thyromegaly   Lungs:   Respirations unlabored   Cardiovascular:   Rhythm is regular, rate is tachycardic. S1 and S2 are normal. No significant murmur is present. Lower extremities demonstrate no significant edema   Extremities: No cyanosis or clubbing   Skin: Skin is warm, dry, and otherwise intact   Neurologic: Gait not assessed. Mood and affect appropriate           Medical History  Surgical History Family History Social History   Past Medical History:   Diagnosis Date    Atrial fibrillation (H)     [id number] score of 7-on chronic warfarin Previously failed Sotalol and Multaq therapy PVI 3/11 Repeat PVI 7/11  " Recurrent A fib, most recent CV Feb 2013 Rx sotalol     Atrial flutter (H)     Atypical flutter with RVR after second PVI in July 2011      Esophageal reflux     Created by Conversion     Essential hypertension          Status post catheter ablation of atrial fibrillation 6/3/2019    PVI 3/11(PVI/Cryo + CTI line) Repeat PVI 7/11 (PVI/RF + CFE + Roof line + GREGORIO line)    Past Surgical History:   Procedure Laterality Date    CV LEFT ATRIAL APPENDAGE CLOSURE Right 4/10/2024    Procedure: Left Atrial Appendage Closure;  Surgeon: Danyel Moon MD;  Location: [facility] CV    EP ABLATION PULMONARY VEIN ISOLATION N/A 9/6/2023    Procedure: Ablation Atrial Fibrillation;  Surgeon: Timur Corona MD;  Location: [facility] CV    HC REMOVE TONSILS/ADENOIDS,<11 Y/O      Description: Tonsillectomy With Adenoidectomy;  Recorded: 10/17/2012;    HC REVISE MEDIAN N/CARPAL TUNNEL SURG      Description: Neuroplasty Decompression Median Nerve At Carpal Tunnel;  Recorded: 10/17/2012;    VA ABLATE HEART DYSRHYTHM FOCUS      Description: Catheter Ablation Atrial Fibrillation;  Recorded: 06/20/2013;  Comments: PVI 3-7-2011 (PVI/cryo + CTI line); ; Repeat PVI July 2011 (PVI/RF + CFE + Roof line + GREGORIO line)    VA ABLATE HEART DYSRHYTHM FOCUS      Description: Catheter Ablation Atrial Fibrillation;  Recorded: 08/05/2014;  Comments: PVI 3-7-2011 (PVI/cryo + CTI line); ; Repeat PVI July 2011 (PVI/RF + CFE + Roof line + GREGORIO line)    VA CARDIOVERSION ELECTIVE ARRHYTHMIA EXTERNAL       Fib 10/17/12; ; Flutter 2/15/13; AFl 11/11/2019    Family History   Problem Relation Age of Onset    Aneurysm Mother         brain    Emphysema Father     Lung Cancer Sister     Hypertension Brother     Lung Cancer Sister     Lung Cancer Sister     Breast Cancer Sister     Social History     Socioeconomic History    Marital status:      Spouse name: Not on file    Number of children: 3    Years of education: Not on file    Highest education  level: Not on file   Occupational History    Not on file   Tobacco Use    Smoking status: Former     Current packs/day: 0.00     Types: Cigarettes     Quit date: 1962     Years since quittin.0    Smokeless tobacco: Never   Vaping Use    Vaping status: Never Used   Substance and Sexual Activity    Alcohol use: Yes     Comment: Alcoholic Drinks/day: 6 per month    Drug use: No    Sexual activity: Yes     Partners: Male   Other Topics Concern    Not on file   Social History Narrative    Not on file     Social Drivers of Health     Financial Resource Strain: Not on file   Food Insecurity: No Food Insecurity (2024)    Received from [employer]    Hunger Vital Sign     Worried About Running Out of Food in the Last Year: Never true     Ran Out of Food in the Last Year: Never true   Transportation Needs: No Transportation Needs (2024)    Received from [employer]    PRAPARE - Transportation     Lack of Transportation (Medical): No     Lack of Transportation (Non-Medical): No   Physical Activity: Not on file   Stress: Not on file   Social Connections: Not on file   Interpersonal Safety: Unknown (2024)    Received from [employer]    Humiliation, Afraid, Rape, and Kick questionnaire     Fear of Current or Ex-Partner: Not on file     Emotionally Abused: Not on file     Physically Abused: No     Sexually Abused: No   Housing Stability: Unknown (2024)    Received from [employer]    Housing Stability Vital Sign     Unable to Pay for Housing in the Last Year: No     Number of Places Lived in the Last Year: Not on file     Unstable Housing in the Last Year: No          Medications  Allergies   Scheduled Meds:  Current Outpatient Medications   Medication Sig Dispense Refill    allopurinol (ZYLOPRIM) 100 MG tablet [ALLOPURINOL (ZYLOPRIM) 100 MG TABLET] Take 100 mg by mouth 2 (two) times a day.             aspirin 81 MG EC tablet Take 1 tablet (81 mg) by mouth daily      atorvastatin  (LIPITOR) 40 MG tablet Take 1 tablet by mouth daily      BD SUELLEN U/F 32G X 4 MM insulin pen needle       cholecalciferol 50 MCG (2000 UT) CAPS Take 2,000 Units by mouth daily      cyanocobalamin (VITAMIN B-12) 1000 MCG tablet Take 1,000 mcg by mouth daily.      insulin degludec (TRESIBA FLEXTOUCH) 100 UNIT/ML pen Inject 20 Units subcutaneously.      insulin glargine (LANTUS PEN) 100 UNIT/ML pen Inject 24 Units subcutaneously every morning.      Lancets (ONETOUCH DELICA PLUS [id number]) MISC Test once daily varying time,      metFORMIN (GLUCOPHAGE XR) 500 MG 24 hr tablet Take 500 mg by mouth daily      metoprolol succinate ER (TOPROL XL) 50 MG 24 hr tablet Take 100 mg by mouth daily.      NOVOLOG FLEXPEN 100 UNIT/ML soln INJECT THREE TIMES DAILY BEFORE MEALS: 1 UNIT IF GLUCOSE -170, 2 UNITS -200, 3 UNITS -230, 4 UNITS -260, 5 UNITS -290, 6 UNITS IF >290, MAX DOSE OF 18 UNITS DAILY.*      ONETOUCH VERIO IQ test strip       pregabalin (LYRICA) 75 MG capsule Take 75 mg by mouth      telmisartan (MICARDIS) 40 MG tablet Take 40 mg by mouth daily.      Allergies   Allergen Reactions    Pregabalin Other (See Comments)     Foggy thinking    Ace Inhibitors Cough    Empagliflozin Other (See Comments)     weakness    Irbesartan Other (See Comments)     exhaustion    Metformin GI Disturbance     2013.  Retry 2016- gastrointestinal intolerance again    Pioglitazone Other (See Comments)     Hand leg and facial swelling    Semaglutide(0.25 Or 0.5mg-Dos) GI Disturbance         Lab Results    Chemistry/lipid CBC Cardiac Enzymes/BNP/TSH/INR   Lab Results   Component Value Date    BUN 23.2 (H) 04/08/2024     04/08/2024    CO2 22 04/08/2024    Lab Results   Component Value Date    WBC 8.6 04/08/2024    HGB 12.3 04/10/2024    HCT 38.9 04/08/2024    MCV 89 04/08/2024     04/08/2024    @RESUFAST(BMP,CBC,BNP,TSH,  INR)@      60 minutes spent reviewing prior records (including documentation,  laboratory studies, cardiac testing/imaging), history and physical exam, planning, and subsequent documentation.     The longitudinal plan of care for the diagnosis(es)/condition(s) as documented were addressed during this visit. Due to the added complexity in care, I will continue to support Ellyn in the subsequent management and with ongoing continuity of care.      This note has been dictated using voice recognition software. Any grammatical, typographical, or context distortions are unintentional and inherent to the software.    Rajani Mcgrath, LONNY  Cardiac Electrophysiology  [facility]  Clinic and [phone]  Fax: [phone]  Electrophysiology Nurses: [phone]

## 2025-04-21 ENCOUNTER — OFFICE VISIT (OUTPATIENT)
Dept: CARDIOLOGY | Facility: CLINIC | Age: 85
End: 2025-04-21
Attending: INTERNAL MEDICINE
Payer: COMMERCIAL

## 2025-04-21 ENCOUNTER — DOCUMENTATION ONLY (OUTPATIENT)
Dept: CARDIOLOGY | Facility: CLINIC | Age: 85
End: 2025-04-21

## 2025-04-21 ENCOUNTER — PREP FOR PROCEDURE (OUTPATIENT)
Dept: CARDIOLOGY | Facility: CLINIC | Age: 85
End: 2025-04-21

## 2025-04-21 VITALS
BODY MASS INDEX: 28.25 KG/M2 | WEIGHT: 149.6 LBS | RESPIRATION RATE: 18 BRPM | HEIGHT: 61 IN | HEART RATE: 128 BPM | SYSTOLIC BLOOD PRESSURE: 116 MMHG | DIASTOLIC BLOOD PRESSURE: 74 MMHG

## 2025-04-21 DIAGNOSIS — I48.3 TYPICAL ATRIAL FLUTTER (H): ICD-10-CM

## 2025-04-21 DIAGNOSIS — I48.4 ATYPICAL ATRIAL FLUTTER (H): Primary | ICD-10-CM

## 2025-04-21 DIAGNOSIS — I48.19 PERSISTENT ATRIAL FIBRILLATION (H): ICD-10-CM

## 2025-04-21 DIAGNOSIS — I48.92 ATRIAL FLUTTER, UNSPECIFIED TYPE (H): ICD-10-CM

## 2025-04-21 DIAGNOSIS — G47.33 OSA ON CPAP: ICD-10-CM

## 2025-04-21 DIAGNOSIS — I48.19 PERSISTENT ATRIAL FIBRILLATION (H): Primary | ICD-10-CM

## 2025-04-21 PROCEDURE — G2211 COMPLEX E/M VISIT ADD ON: HCPCS | Performed by: NURSE PRACTITIONER

## 2025-04-21 PROCEDURE — 93000 ELECTROCARDIOGRAM COMPLETE: CPT | Performed by: INTERNAL MEDICINE

## 2025-04-21 PROCEDURE — 3074F SYST BP LT 130 MM HG: CPT | Performed by: NURSE PRACTITIONER

## 2025-04-21 PROCEDURE — 3078F DIAST BP <80 MM HG: CPT | Performed by: NURSE PRACTITIONER

## 2025-04-21 PROCEDURE — 99417 PROLNG OP E/M EACH 15 MIN: CPT | Performed by: NURSE PRACTITIONER

## 2025-04-21 PROCEDURE — 99215 OFFICE O/P EST HI 40 MIN: CPT | Performed by: NURSE PRACTITIONER

## 2025-04-21 RX ORDER — SODIUM CHLORIDE 9 MG/ML
INJECTION, SOLUTION INTRAVENOUS CONTINUOUS
Status: DISCONTINUED | OUTPATIENT
Start: 2025-04-21 | End: 2025-04-21 | Stop reason: HOSPADM

## 2025-04-21 RX ORDER — POTASSIUM CHLORIDE 1500 MG/1
20 TABLET, EXTENDED RELEASE ORAL
OUTPATIENT
Start: 2025-04-21 | End: 2025-04-21

## 2025-04-21 RX ORDER — LIDOCAINE 40 MG/G
CREAM TOPICAL
OUTPATIENT
Start: 2025-04-21

## 2025-04-21 NOTE — PROGRESS NOTES
AC: Eliquis NP Med Review: CHANGES- discontinue metoprolol and start sotalol 60 mg twice daily 2 days prior to procedure    DM Meds:  Metformin, short acting insulin, long acting insulin.  GLP-1:None  SGLT2 Inhibitors: None  ED Meds: None     Rajani Mcgrath, TEODORO CNP  P [facility] Ep Support [facility]  Outpatient cardioversion ordered today. If she can go this week she will not have been adequately anticoagulated and will need prior HALI. If waiting until next week ok to forgo HALI as long as she has been on DOAC >1 week. If HALI, hold metoprolol evening prior to or AM of procedure and will start sotalol 60 mg twice daily post DCCV. If appropriately anticoagulated discontinue metoprolol and start sotalol 60 mg twice daily 2 days prior to procedure. Sorry this was somewhat confusing! Let me know if I can help! Orders in Epic.      Confirmed next available DCCV 4/30. No HALI needed.    Yvonne Fatima, 1940, [id number]  Home:[phone] (home) Cell:[phone] (mobile)  Emergency Contact: Jesus Fatima [phone]  PCP: Rossy Yoon MD, [phone]    +++Important patient information for CSC/Cath Lab staff : None+++    [facility] EP Cath Lab Procedure Order   Procedure: Cardioversion for AF  Ordering Provider: Rajani Mcgrath NP  Date Ordered and Prepped: 4/21/2025 Fiona Hernández RN  Anticipated Case Duration:  Standard  Scheduling Needs/Timeframe:  Next Available  Scheduling Contact: Please contact pt to schedule  Cardiology Follow Up Apt s/p: Schedule 4-6 wks follow-up with EP MD for PVI/AF consults  Current Device/Device Co Needed for Procedure: None NoneNone  Pre-Procedural Testing needed: None  Anesthesia:  General    [facility] EP Cath Lab Prep   H&P:  Compled by cardiology on 4/21/25 if scheduled within 30 days, pt to schedule with PMD if procedure outside of this timeframe  Pre-op Labs: K if pt taking diuretic medication or hx of low Potassium, Beta HcG if appropriate, and INR if on Warfarin will be ordered AM of procedure  and Review of most recent labs, WEL for procedure  T&S Pre-Procedure Review: T&S is not required for DCCV/DFT Testing  Medical Records Pertinent for Procedure:  None  Iodinated Contrast Dye Allergies (Does not include Shellfish, Egg, and/or Iodine Allergy): NA  GLP-1 Protocol: If on Dulaglutide (Trulicity) (weekly)- Injection hold 7 days prior to procedure  , Exenatide extended release (Bydureon bcise) (weekly)- Injection hold 7 days prior to procedure, Exenatide (Byetta) (twice daily)- Oral Tablet hold day prior and morning of procedure and for Injection hold 7 days prior to procedure, Semaglutide (Ozempic) (weekly)- Injection and Oral hold 7 days prior to procedure, Liraglutide (Victoza, Saxenda) (daily)- Injection hold day prior and morning of procedure  SGLT2 Inhibitors Protocol: ertugliflozin (Steglatro)- Hold 4 days prior to procedure for risk of ketoacidosis. canagliflozin (Invokana), dapagliflozin (Farxiga), and empagliflozin (Jardiance)- Hold 3 days prior to procedure for risk of ketoacidosis. If pt taking for pulmonary HTN, continue.   ED Meds Protocol:  If taking Sildenafil (Viagra), Tadalafill (Cialis, Adcirca), or Vardnafil (Levitra, Staxyn)- Hold 3 days prior to procedure    Allergies   Allergen Reactions    Pregabalin Other (See Comments)     Foggy thinking    Ace Inhibitors Cough    Empagliflozin Other (See Comments)     weakness    Irbesartan Other (See Comments)     exhaustion    Metformin GI Disturbance     2013.  Retry 2016- gastrointestinal intolerance again    Pioglitazone Other (See Comments)     Hand leg and facial swelling    Semaglutide(0.25 Or 0.5mg-Dos) GI Disturbance       Current Outpatient Medications:     allopurinol (ZYLOPRIM) 100 MG tablet, [ALLOPURINOL (ZYLOPRIM) 100 MG TABLET] Take 100 mg by mouth 2 (two) times a day.       , Disp: , Rfl:     apixaban ANTICOAGULANT (ELIQUIS) 5 MG tablet, Take 1 tablet (5 mg) by mouth 2 times daily., Disp: 70 tablet, Rfl: 0    aspirin 81 MG EC  tablet, Take 1 tablet (81 mg) by mouth daily, Disp: , Rfl:     atorvastatin (LIPITOR) 40 MG tablet, Take 1 tablet by mouth daily, Disp: , Rfl:     BD SUELLEN U/F 32G X 4 MM insulin pen needle, , Disp: , Rfl:     cholecalciferol 50 MCG (2000 UT) CAPS, Take 2,000 Units by mouth daily, Disp: , Rfl:     cyanocobalamin (VITAMIN B-12) 1000 MCG tablet, Take 1,000 mcg by mouth daily., Disp: , Rfl:     insulin degludec (TRESIBA FLEXTOUCH) 100 UNIT/ML pen, Inject 20 Units subcutaneously., Disp: , Rfl:     insulin glargine (LANTUS PEN) 100 UNIT/ML pen, Inject 24 Units subcutaneously every morning., Disp: , Rfl:     Lancets (ONETOUCH DELICA PLUS [id number]) MISC, Test once daily varying time,, Disp: , Rfl:     metFORMIN (GLUCOPHAGE XR) 500 MG 24 hr tablet, Take 500 mg by mouth daily, Disp: , Rfl:     metoprolol succinate ER (TOPROL XL) 50 MG 24 hr tablet, Take 100 mg by mouth daily., Disp: , Rfl:     NOVOLOG FLEXPEN 100 UNIT/ML soln, INJECT THREE TIMES DAILY BEFORE MEALS: 1 UNIT IF GLUCOSE -170, 2 UNITS -200, 3 UNITS -230, 4 UNITS -260, 5 UNITS -290, 6 UNITS IF >290, MAX DOSE OF 18 UNITS DAILY.*, Disp: , Rfl:     ONETOUCH VERIO IQ test strip, , Disp: , Rfl:     pregabalin (LYRICA) 75 MG capsule, Take 75 mg by mouth, Disp: , Rfl:     telmisartan (MICARDIS) 40 MG tablet, Take 40 mg by mouth daily., Disp: , Rfl:   No current facility-administered medications for this visit.    Facility-Administered Medications Ordered in Other Visits:     sodium chloride 0.9 % infusion, , Intravenous, Continuous, Rjaani Mcgrath APRN CNP    Documentation Date:4/21/2025 11:56 AM  Fiona Hernández RN

## 2025-04-21 NOTE — Clinical Note
Outpatient cardioversion ordered today. If she can go this week she will not have been adequately anticoagulated and will need prior HALI. If waiting until next week ok to forgo HALI as long as she has been on DOAC >1 week. If HALI, hold metoprolol evening prior to or AM of procedure and will start sotalol 60 mg twice daily post DCCV. If appropriately anticoagulated discontinue metoprolol and start sotalol 60 mg twice daily 2 days prior to procedure. Sorry this was somewhat confusing! Let me know if I can help! Orders in Epic.

## 2025-04-21 NOTE — LETTER
4/21/2025    Rossy Yoon MD  [facility] [address]  [facility] [address]    RE: Yvonne Fatima       Dear Colleague,     I had the pleasure of seeing Yvonne Fatima in the [facility].     [facility]  Cardiac Electrophysiology  [address]  [place] [address]   Office: [phone]  Fax: [phone]     HEART CARE ELECTROPHYSIOLOGY FOLLOW UP    Primary Care: Rossy Yoon MD, MD      Assessment/Recommendations     Persistent atrial fibrillation, atrial flutter, focal atrial tachycardia: Status post catheter ablation of focal atrial tachycardia 9/6/2023; baseline isolation of PVI, roofline, GREGORIO line and CFE, with noted severely reduced LA voltage and <5% viable myocardium. She is in persistent atypical atrial flutter with poorly controlled ventricular response despite beta blocker uptitration, associated with activity intolerance. We had a lengthy discussion of rhythm management options including cardioversion with antiarrhythmic therapy and AV node ablation and device implant. She would prefer attempts at sinus rhythm with the former     Percutaneous left atrial appendage closure in situ: [id number] score of 7 for age >75, gender, HTN, DM, TIA; HAS BLED 3 for age >65, bleeding predisposition with history of gait instability and falls, CVA/TIA. Previously on warfarin.  Status post Watchman implant 4/10/2024. 1 year post implant CT 4/11/2025 unremarkable for peridevice flow or thrombus    SHAVON: consistent use of CPAP    Plan:  Start Eliquis 5 mg twice daily for stroke prophylaxis  Discontinue aspirin while on Eliquis  Outpatient DCCV   Continue metoprolol succinate 100 mg daily. Transition to sotalol 60 mg twice daily before or after cardioversion pending scheduling   Follow-up 1-2 months        History of Present Illness/Subjective    Yvonne Fatima is a 85 year old female with past medical history significant for  "persistent atrial fibrillation and atrial flutter with prior ablations  and , percutaneous left atrial appendage closure in situ 4/10/2024, nonobstructive CAD, HTN, T2DM, SHAVON, TIA, CKD, right breast cancer with partial mastectomy 2025, seen today for EP follow-up.  She saw her primary care physician for preop exam prior to cataract surgery earlier this month and was noted to be in atypical atrial flutter with rapid ventricular response for which her metoprolol was increased.  She \"wears out\" faster and needs more rest after activity recently. In hindsight she may have been more tired on and off over the preceding months though attributed this to aging and low blood sugars between meals.  There is also mention of \"junctional tachycardia\" around the time of a hospitalization last summer, though no documentation of such and she was back in sinus rhythm at time of follow-up 2024. She has not been more short of breath or noticed palpitations. She denies chest discomfort, orthopnea, lightheadedness/dizziness, pedal edema or syncope.        Data Review     Arrhythmia hx  Sx: Lightheadedness, weakness, general malaise  Dx/date: unknown AF diagnosis. Atypical AFL 2013. Recurrences [id number]. Recurrent persistent atypical AFL 2025, though possibly paroxysmal around hospitalization 2024  AAD: prior use Multaq ~. Sotalol  - 2023  DCCV: 2019, 9/3/2021, 2023  Ablation: Cryoablation PVI and CTI 3/2011.  RF PVI, CFE, roofline and GREGORIO line 2011.  Focal AT (lateral LA); baseline PVI, roofline, GREGORIO and CFE isolation 2023 (Dr. Corona); severely reduced LA voltage, viable myocardium <5%  [id number] score of 7 for age >75, gender, HTN, DM, TIA; HAS BLED 3 for age >65, bleeding predisposition with history of gait instability and falls, CVA/TIA  OAC: Previously on warfarin.  Status post percutaneous left atrial appendage closure 4/10/2024    EK2025: Atypical  bpm  2025: " "Atypical  bpm  10/18/2023: SR 85 bpm, QRS 84 ms, QT/QTc 372/442 ms  9/6/2023: SR 83 bpm, QRS 94 ms, QT/QTc 426/500 ms  8/31/2023: Sinus rhythm 66 bpm, AV delay  ms, QRS 94 ms, QT/QTc 432/452 ms  6/21/2023: Sinus rhythm 50 bpm, brief second-degree AVB type II,  ms, QT/QTc 488/444 ms  6/16/2023: Atypical atrial flutter 105 bpm, QRS 84 ms, QT/QTc 370/489 ms  Personally reviewed.      TTE: 8/7/2023  The left ventricle is normal in size.  Left ventricular function is normal.The ejection fraction is 60-65%.  Left ventricular diastolic function is abnormal.  The right ventricle is mildly dilated.  The left atrium is moderately dilated. The right atrium is moderately dilated.  There is severe mitral annular calcification.  There is mild mitral stenosis.  There is moderate (2+) tricuspid regurgitation.  Right ventricular systolic pressure is elevated, consistent with mild  pulmonary hypertension.    I have reviewed and updated the patient's past medical history, allergy list and medication list.          Physical Examination   BMI= Body mass index is 28.27 kg/m .    Wt Readings from Last 3 Encounters:   04/21/25 67.9 kg (149 lb 9.6 oz)   04/14/25 67.6 kg (149 lb)   10/16/24 65.3 kg (144 lb)       Vitals: /74 (BP Location: Left arm, Patient Position: Sitting, Cuff Size: Adult Regular)   Pulse (!) 128   Resp 18   Ht 1.549 m (5' 1\")   Wt 67.9 kg (149 lb 9.6 oz)   BMI 28.27 kg/m    General   Appearance:   Alert and oriented, in no acute distress   HEENT:  Normocephalic and atraumatic   Neck: No JVP, carotid bruit or obvious thyromegaly   Lungs:   Respirations unlabored   Cardiovascular:   Rhythm is regular, rate is tachycardic. S1 and S2 are normal. No significant murmur is present. Lower extremities demonstrate no significant edema   Extremities: No cyanosis or clubbing   Skin: Skin is warm, dry, and otherwise intact   Neurologic: Gait not assessed. Mood and affect appropriate           Medical " History  Surgical History Family History Social History   Past Medical History:   Diagnosis Date     Atrial fibrillation (H)     [id number] score of 7-on chronic warfarin Previously failed Sotalol and Multaq therapy PVI 3/11 Repeat PVI 7/11  Recurrent A fib, most recent CV Feb 2013 Rx sotalol      Atrial flutter (H)     Atypical flutter with RVR after second PVI in July 2011       Esophageal reflux     Created by Conversion      Essential hypertension           Status post catheter ablation of atrial fibrillation 6/3/2019    PVI 3/11(PVI/Cryo + CTI line) Repeat PVI 7/11 (PVI/RF + CFE + Roof line + GREGORIO line)    Past Surgical History:   Procedure Laterality Date     CV LEFT ATRIAL APPENDAGE CLOSURE Right 4/10/2024    Procedure: Left Atrial Appendage Closure;  Surgeon: Danyel Moon MD;  Location: [facility] CATH LAB CV     EP ABLATION PULMONARY VEIN ISOLATION N/A 9/6/2023    Procedure: Ablation Atrial Fibrillation;  Surgeon: Timur Corona MD;  Location: [facility] CATH LAB CV     HC REMOVE TONSILS/ADENOIDS,<11 Y/O      Description: Tonsillectomy With Adenoidectomy;  Recorded: 10/17/2012;     HC REVISE MEDIAN N/CARPAL TUNNEL SURG      Description: Neuroplasty Decompression Median Nerve At Carpal Tunnel;  Recorded: 10/17/2012;     OK ABLATE HEART DYSRHYTHM FOCUS      Description: Catheter Ablation Atrial Fibrillation;  Recorded: 06/20/2013;  Comments: PVI 3-7-2011 (PVI/cryo + CTI line); ; Repeat PVI July 2011 (PVI/RF + CFE + Roof line + GREGORIO line)     OK ABLATE HEART DYSRHYTHM FOCUS      Description: Catheter Ablation Atrial Fibrillation;  Recorded: 08/05/2014;  Comments: PVI 3-7-2011 (PVI/cryo + CTI line); ; Repeat PVI July 2011 (PVI/RF + CFE + Roof line + GREGORIO line)     OK CARDIOVERSION ELECTIVE ARRHYTHMIA EXTERNAL       Fib 10/17/12; ; Flutter 2/15/13; AFl 11/11/2019    Family History   Problem Relation Age of Onset     Aneurysm Mother         brain     Emphysema Father      Lung Cancer Sister      Hypertension  Brother      Lung Cancer Sister      Lung Cancer Sister      Breast Cancer Sister     Social History     Socioeconomic History     Marital status:      Spouse name: Not on file     Number of children: 3     Years of education: Not on file     Highest education level: Not on file   Occupational History     Not on file   Tobacco Use     Smoking status: Former     Current packs/day: 0.00     Types: Cigarettes     Quit date: 1962     Years since quittin.0     Smokeless tobacco: Never   Vaping Use     Vaping status: Never Used   Substance and Sexual Activity     Alcohol use: Yes     Comment: Alcoholic Drinks/day: 6 per month     Drug use: No     Sexual activity: Yes     Partners: Male   Other Topics Concern     Not on file   Social History Narrative     Not on file     Social Drivers of Health     Financial Resource Strain: Not on file   Food Insecurity: No Food Insecurity (2024)    Received from [facility]    Hunger Vital Sign      Worried About Running Out of Food in the Last Year: Never true      Ran Out of Food in the Last Year: Never true   Transportation Needs: No Transportation Needs (2024)    Received from [facility]    PRAPARE - Transportation      Lack of Transportation (Medical): No      Lack of Transportation (Non-Medical): No   Physical Activity: Not on file   Stress: Not on file   Social Connections: Not on file   Interpersonal Safety: Unknown (2024)    Received from [facility]    Humiliation, Afraid, Rape, and Kick questionnaire      Fear of Current or Ex-Partner: Not on file      Emotionally Abused: Not on file      Physically Abused: No      Sexually Abused: No   Housing Stability: Unknown (2024)    Received from [facility]    Housing Stability Vital Sign      Unable to Pay for Housing in the Last Year: No      Number of Places Lived in the Last Year: Not on file      Unstable Housing in the Last Year: No          Medications  Allergies   Scheduled  Meds:  Current Outpatient Medications   Medication Sig Dispense Refill     allopurinol (ZYLOPRIM) 100 MG tablet [ALLOPURINOL (ZYLOPRIM) 100 MG TABLET] Take 100 mg by mouth 2 (two) times a day.              aspirin 81 MG EC tablet Take 1 tablet (81 mg) by mouth daily       atorvastatin (LIPITOR) 40 MG tablet Take 1 tablet by mouth daily       BD SUELLEN U/F 32G X 4 MM insulin pen needle        cholecalciferol 50 MCG (2000 UT) CAPS Take 2,000 Units by mouth daily       cyanocobalamin (VITAMIN B-12) 1000 MCG tablet Take 1,000 mcg by mouth daily.       insulin degludec (TRESIBA FLEXTOUCH) 100 UNIT/ML pen Inject 20 Units subcutaneously.       insulin glargine (LANTUS PEN) 100 UNIT/ML pen Inject 24 Units subcutaneously every morning.       Lancets (ONETOUCH DELICA PLUS [id number]) MISC Test once daily varying time,       metFORMIN (GLUCOPHAGE XR) 500 MG 24 hr tablet Take 500 mg by mouth daily       metoprolol succinate ER (TOPROL XL) 50 MG 24 hr tablet Take 100 mg by mouth daily.       NOVOLOG FLEXPEN 100 UNIT/ML soln INJECT THREE TIMES DAILY BEFORE MEALS: 1 UNIT IF GLUCOSE -170, 2 UNITS -200, 3 UNITS -230, 4 UNITS -260, 5 UNITS -290, 6 UNITS IF >290, MAX DOSE OF 18 UNITS DAILY.*       ONETOUCH VERIO IQ test strip        pregabalin (LYRICA) 75 MG capsule Take 75 mg by mouth       telmisartan (MICARDIS) 40 MG tablet Take 40 mg by mouth daily.      Allergies   Allergen Reactions     Pregabalin Other (See Comments)     Foggy thinking     Ace Inhibitors Cough     Empagliflozin Other (See Comments)     weakness     Irbesartan Other (See Comments)     exhaustion     Metformin GI Disturbance     2013.  Retry 2016- gastrointestinal intolerance again     Pioglitazone Other (See Comments)     Hand leg and facial swelling     Semaglutide(0.25 Or 0.5mg-Dos) GI Disturbance         Lab Results    Chemistry/lipid CBC Cardiac Enzymes/BNP/TSH/INR   Lab Results   Component Value Date    BUN 23.2 (H) 04/08/2024      2024    CO2 22 2024    Lab Results   Component Value Date    WBC 8.6 2024    HGB 12.3 04/10/2024    HCT 38.9 2024    MCV 89 2024     2024    @RESUFAST(BMP,CBC,BNP,TSH,  INR)@      60 minutes spent reviewing prior records (including documentation, laboratory studies, cardiac testing/imaging), history and physical exam, planning, and subsequent documentation.     The longitudinal plan of care for the diagnosis(es)/condition(s) as documented were addressed during this visit. Due to the added complexity in care, I will continue to support Ellyn in the subsequent management and with ongoing continuity of care.      This note has been dictated using voice recognition software. Any grammatical, typographical, or context distortions are unintentional and inherent to the software.    Rajani Mcgrath CNP  Cardiac Electrophysiology  [facility]  Clinic and [phone]  Fax: [phone]  Electrophysiology Nurses: [phone]          Thank you for allowing me to participate in the care of your patient.      Sincerely,     TEODORO Soto CNP     [facility]  cc:   Timur Corona MD  [address]  [place] [address]

## 2025-04-22 LAB
ATRIAL RATE - MUSE: 128 BPM
DIASTOLIC BLOOD PRESSURE - MUSE: NORMAL MMHG
INTERPRETATION ECG - MUSE: NORMAL
P AXIS - MUSE: NORMAL DEGREES
PR INTERVAL - MUSE: 160 MS
QRS DURATION - MUSE: 92 MS
QT - MUSE: 314 MS
QTC - MUSE: 458 MS
R AXIS - MUSE: 6 DEGREES
SYSTOLIC BLOOD PRESSURE - MUSE: NORMAL MMHG
T AXIS - MUSE: 16 DEGREES
VENTRICULAR RATE- MUSE: 128 BPM

## 2025-04-22 RX ORDER — SOTALOL HYDROCHLORIDE 120 MG/1
60 TABLET ORAL 2 TIMES DAILY
Qty: 30 TABLET | Refills: 11 | Status: SHIPPED | OUTPATIENT
Start: 2025-04-22

## 2025-04-23 ENCOUNTER — TELEPHONE (OUTPATIENT)
Dept: CARDIOLOGY | Facility: CLINIC | Age: 85
End: 2025-04-23
Payer: COMMERCIAL

## 2025-04-23 NOTE — TELEPHONE ENCOUNTER
Pre-Procedure Education    Procedure: DCCV with Rajani  NP on 4/30/2025 with arrival time 8:30 am    PT IS POST WATCHMAN INSTRUCTED TO START ELIQUIS  HER ELIQUIS TODAY AND CALL IF ANY MISSED DOSES  Orders: Orderset for procedure verified signed/held    COVID: COVID policy- if pt develops COVID like symptoms prior to procedure, he/she would need to complete an at home with a rapid antigen COVID test 1-2 days prior to your procedure date. If COVID + pt is aware the procedure will need to be rescheduled, and to contact CV scheduling as soon as possible    Pre-Op H&P: Completed- Available in Epic    Education:    PT HAS A  FOR PROCEDURE THAT WILL STAY WITH HER    PT HAS MY CHART  PT AWARE PROCEDURE LETTER SENT    PT INSTRUCTED TO HOLD ANY VITAMINS MINERALS CALCIUM IRON OR SUPPLEMENTS THE MORNING OF CV  PT INSTRUCTED NO GUM CHEWING MINTS OR CANDY THE MORNING OF CV  PT INSTRUCTED TO LEAVE JEWELRY AT HOME  PT INSTRUCTED TO BATHE OR SHOWER BEFORE COMING IN  PT IS POST WATCHMAN ON ELIQUIS  PT INSTRUCTED TO STOP HER METOPROLOL 2 DAYS PRIOR CV AND START HER SOTALOL 60 MG Q 12 HRS  PT INSTRUCTED TO HOLD HER METFORMIN THE MORNING OF CV  PT IS SHAVON/CPAP  PT HAS A POST CV FOLLOW UP WITH DR GARCIA 6/18     Contact: Reviewed via phone with pt    Pre-Procedure Instruction: NPO after midnight pre procedure, Defined NPO with pt, Remove all jewelry and leave all valuables at home, Shower prior to arrival, Sedation plan/orders, Transportation requirements and arrangements post procedure, Post-procedure follow up process, Post-procedure restrictions/expectations, and Pre-procedure letter sent- letter tab  Risks:      Medication:   Instructions regarding anticoagulants: Eliquis- To continue anticoagulation uninterrupted through their procedure    Instructions regarding antiarrhythmic medication: Sotalol; continue medication prior to procedure as prescribed    Instructions given to pt regarding diuretics medication: NA for  DCCV    Instructions given to pt regarding Diabetic medications:   DM-  PT INSTRUCTED TO HOLD HER METFORMIN THE MORNING OF CV    GLP-1-  REVIEWED INSULIN INSTRUCTIONS ON PROCEDURE LETTER    SGLT2- None     Instructions given to pt regarding ED medication: None    Instructions for medication, other than anticoagulants and antiarrhythmics listed above, given to pt: Take all medication AM of procedure with small sips of water     Important patient information for staff:  PT NEW SOTALOL START  PT POST WATCHMAN  PT IS SHAVON/CPAP    4/23/2025 10:10 AM  Luna Valencia LPN

## 2025-04-30 ENCOUNTER — ANESTHESIA (OUTPATIENT)
Dept: CARDIOLOGY | Facility: HOSPITAL | Age: 85
End: 2025-04-30
Payer: COMMERCIAL

## 2025-04-30 ENCOUNTER — ANESTHESIA EVENT (OUTPATIENT)
Dept: CARDIOLOGY | Facility: HOSPITAL | Age: 85
End: 2025-04-30
Payer: COMMERCIAL

## 2025-04-30 ENCOUNTER — HOSPITAL ENCOUNTER (OUTPATIENT)
Dept: CARDIOLOGY | Facility: HOSPITAL | Age: 85
Discharge: HOME | End: 2025-04-30
Attending: NURSE PRACTITIONER | Admitting: NURSE PRACTITIONER
Payer: COMMERCIAL

## 2025-04-30 VITALS
TEMPERATURE: 98.2 F | HEIGHT: 61 IN | HEART RATE: 54 BPM | WEIGHT: 149 LBS | SYSTOLIC BLOOD PRESSURE: 125 MMHG | RESPIRATION RATE: 22 BRPM | DIASTOLIC BLOOD PRESSURE: 58 MMHG | OXYGEN SATURATION: 97 % | BODY MASS INDEX: 28.13 KG/M2

## 2025-04-30 DIAGNOSIS — I48.4 ATYPICAL ATRIAL FLUTTER (H): ICD-10-CM

## 2025-04-30 LAB
ATRIAL RATE - MUSE: 56 BPM
DIASTOLIC BLOOD PRESSURE - MUSE: NORMAL MMHG
INTERPRETATION ECG - MUSE: NORMAL
P AXIS - MUSE: 33 DEGREES
POTASSIUM SERPL-SCNC: 4.4 MMOL/L (ref 3.4–5.3)
PR INTERVAL - MUSE: 324 MS
QRS DURATION - MUSE: 94 MS
QT - MUSE: 470 MS
QTC - MUSE: 453 MS
R AXIS - MUSE: 34 DEGREES
SYSTOLIC BLOOD PRESSURE - MUSE: NORMAL MMHG
T AXIS - MUSE: 5 DEGREES
VENTRICULAR RATE- MUSE: 56 BPM

## 2025-04-30 PROCEDURE — 84132 ASSAY OF SERUM POTASSIUM: CPT | Performed by: NURSE PRACTITIONER

## 2025-04-30 PROCEDURE — 93005 ELECTROCARDIOGRAM TRACING: CPT

## 2025-04-30 PROCEDURE — 999N000054 HC STATISTIC EKG NON-CHARGEABLE

## 2025-04-30 PROCEDURE — 36415 COLL VENOUS BLD VENIPUNCTURE: CPT | Performed by: NURSE PRACTITIONER

## 2025-04-30 PROCEDURE — 250N000011 HC RX IP 250 OP 636: Performed by: NURSE ANESTHETIST, CERTIFIED REGISTERED

## 2025-04-30 PROCEDURE — 92960 CARDIOVERSION ELECTRIC EXT: CPT | Performed by: NURSE PRACTITIONER

## 2025-04-30 PROCEDURE — 92960 CARDIOVERSION ELECTRIC EXT: CPT

## 2025-04-30 PROCEDURE — 370N000017 HC ANESTHESIA TECHNICAL FEE, PER MIN

## 2025-04-30 PROCEDURE — 93010 ELECTROCARDIOGRAM REPORT: CPT | Performed by: INTERNAL MEDICINE

## 2025-04-30 RX ORDER — PROPOFOL 10 MG/ML
INJECTION, EMULSION INTRAVENOUS PRN
Status: DISCONTINUED | OUTPATIENT
Start: 2025-04-30 | End: 2025-04-30

## 2025-04-30 RX ORDER — POTASSIUM CHLORIDE 1500 MG/1
20 TABLET, EXTENDED RELEASE ORAL
Status: DISCONTINUED | OUTPATIENT
Start: 2025-04-30 | End: 2025-04-30 | Stop reason: HOSPADM

## 2025-04-30 RX ORDER — LIDOCAINE 40 MG/G
CREAM TOPICAL
Status: DISCONTINUED | OUTPATIENT
Start: 2025-04-30 | End: 2025-04-30 | Stop reason: HOSPADM

## 2025-04-30 RX ADMIN — PROPOFOL 60 MG: 10 INJECTION, EMULSION INTRAVENOUS at 10:09

## 2025-04-30 ASSESSMENT — ENCOUNTER SYMPTOMS: DYSRHYTHMIAS: 1

## 2025-04-30 ASSESSMENT — ACTIVITIES OF DAILY LIVING (ADL)
ADLS_ACUITY_SCORE: 41

## 2025-04-30 NOTE — PROCEDURES
[facility]    Procedure: Cardioversion External    Date/Time: 4/30/2025 11:08 AM    Performed by: Rajani Mcgrath APRN CNP  Authorized by: Rajani Mcgrath APRN CNP      UNIVERSAL PROTOCOL   Site Marked: NA  Prior Images Obtained and Reviewed:  Yes  Required items: Required blood products, implants, devices and special equipment available    Patient identity confirmed:  Verbally with patient, arm band, provided demographic data and hospital-assigned identification number  Patient was reevaluated immediately before administering moderate or deep sedation or anesthesia  Confirmation Checklist:  Patient's identity using two indicators, relevant allergies, procedure was appropriate and matched the consent or emergent situation and correct equipment/implants were available  Time out: Immediately prior to the procedure a time out was called    Universal Protocol: the Joint Commission Universal Protocol was followed       ANESTHESIA    Anesthesia was administered and monitored by anesthesiology.  See anesthesia documentation for details.    SEDATION  Patient Sedated: Yes    Vital signs: Vital signs monitored during sedation      PROCEDURE DETAILS  Cardioversion basis: elective  Pre-procedure rhythm: atrial flutter  Patient position: patient was placed in a supine position  Chest area: chest area exposed  Electrodes: pads  Electrodes placed: anterior-posterior  Number of attempts: 1    Details of Attempts:  At 1013 after administration of IV propofol by MDA and confirmation of adequate sedation she received a single synchronous shock at 100 J with demonstration of sinus rhythm/sinus bradycardia. Post cardioversion EKG shows SB with PVC 56 bpm, QRS 94 ms, QT/QTc 470/453 ms  Post-procedure rhythm: normal sinus rhythm  Complications: no complications      PROCEDURE  Describe Procedure: History of persistent atrial fibrillation and atrial flutter with prior ablations 2011 and 2023, percutaneous  left atrial appendage closure in situ 4/10/2024, nonobstructive CAD, HTN, T2DM, SHAVON, TIA, CKD, right breast cancer with partial mastectomy 2/2025. She developed recurrence of persistent atypical atrial flutter April 2025, though possibly paroxysmal around the time of the hospitalization June 2024, associated with fatigue and decreased activity tolerance and unresponsive to beta-blocker uptitration. She has severe atriopathy and preferred attempts at rhythm control with antiarrhythmic therapy prior to further consideration of AV node ablation and device therapy. She was started on sotalol 60 mg twice a day prior to successful cardioversion today  Continue sotalol 60 mg twice a day  Continue Eliquis 5 mg twice a day for 4 weeks post cardioversion then resume aspirin 81 mg daily for stroke prophylaxis  Discontinue metoprolol  Follow-up with Dr. Corona as scheduled   Patient Tolerance:  Patient tolerated the procedure well with no immediate complications

## 2025-04-30 NOTE — DISCHARGE INSTRUCTIONS
Cardioversion  Cardioversion is a procedure to restore your heart's normal rhythm from a fast or irregular rhythm (arrhythmia) in the top or bottom chambers of your heart. You may have the procedure in a hospital or surgery center. It's often done on an outpatient (same day) basis. During the procedure, your doctor will give you medication to keep you free from pain. Then the doctor gives you a brief electric shock. This helps your heartbeat become normal again. In most cases, you can go home within hours of the procedure.    Before Your Procedure  Tell your doctor what over-the-counter and prescription medications, herbs, and supplements you are taking.  Take medication as directed. Your doctor may prescribe anticoagulants (blood thinners), depending on your situation. They help prevent blood clots from forming.  Ask your doctor about the risks and benefits of cardioversion.  Sign your consent form.  Don t eat or drink anything for 8  hours before your procedure.  Follow any other instructions your doctor gives you.   Arrange for an adult to drive you home after the procedure.     During Your Procedure  Your health care provider will place small pads (electrodes) on your chest to record your heartbeat at all times.  Your health care provider will place an intravenous (IV) line in your arm. This gives you medication (sedation) that keeps you free of pain. You ll feel sleepy.      After Your Procedure  Your health care provider will monitor you until you are fully awake. Then you ll be able to sit up, walk, and eat.  In most cases, you ll be able to go home after the sedation wears off. This usually takes a few hours.  For a few days, the skin on your chest may feel a little sore, like a mild sunburn.  DO NOT  drive or operate heavy machinery for 24 hours after the procedure.  The day after your procedure, try to take it easy. Take medication as directed.  Call your doctor if you notice skipped beats, a rapid  heartbeat, or chest tightness. These may be signs that an irregular heartbeat has returned

## 2025-04-30 NOTE — ANESTHESIA CARE TRANSFER NOTE
Patient: Yvonne MARTIN Orfjocelyn    Procedure: * No procedures listed *  Cardioversion External    Diagnosis: * No pre-op diagnosis entered *  Diagnosis Additional Information: No value filed.    Anesthesia Type:   MAC     Note:    Oropharynx: oropharynx clear of all foreign objects  Level of Consciousness: drowsy  Oxygen Supplementation: room air and nasal cannula  Level of Supplemental Oxygen (L/min / FiO2): 4  Independent Airway: airway patency satisfactory and stable  Dentition: dentition unchanged  Vital Signs Stable: post-procedure vital signs reviewed and stable  Report to RN Given: handoff report given  Patient transferred to: Cardiac Special Care          Vitals:  Vitals Value Taken Time   /51 04/30/25 1014   Temp     Pulse 53 04/30/25 1013   Resp 22 04/30/25 1013   SpO2 98 % 04/30/25 1013   Vitals shown include unfiled device data.    Electronically Signed By: Madhu Allison  April 30, 2025  10:15 AM

## 2025-04-30 NOTE — ANESTHESIA POSTPROCEDURE EVALUATION
Patient: Yvonne Fatima    Procedure: * No procedures listed *  Cardioversion External    Anesthesia Type:  MAC    Note:  Disposition: Outpatient   Postop Pain Control: Uneventful            Sign Out: Well controlled pain   PONV: No   Neuro/Psych: Uneventful            Sign Out: Acceptable/Baseline neuro status   Airway/Respiratory: Uneventful            Sign Out: Acceptable/Baseline resp. status   CV/Hemodynamics: Uneventful            Sign Out: Acceptable CV status; No obvious hypovolemia; No obvious fluid overload   Other NRE: NONE   DID A NON-ROUTINE EVENT OCCUR? No           Last vitals:  Vitals:    04/30/25 1018 04/30/25 1019 04/30/25 1022   BP: 89/47 91/45 93/49   Pulse: 51 50 50   Resp: 18 19 18   Temp:      SpO2: 98% 99% 99%       Electronically Signed By: Johnathan Grullon MD  April 30, 2025  10:30 AM

## 2025-04-30 NOTE — INTERVAL H&P NOTE
"I have reviewed the surgical (or preoperative) H&P that is linked to this encounter, and examined the patient. There are no significant changes    Clinical Conditions Present on Arrival:  Clinically Significant Risk Factors Present on Admission                 # Drug Induced Coagulation Defect: home medication list includes an anticoagulant medication  # Drug Induced Platelet Defect: home medication list includes an antiplatelet medication      # Overweight: Estimated body mass index is 28.15 kg/m  as calculated from the following:    Height as of this encounter: 1.549 m (5' 1\").    Weight as of this encounter: 67.6 kg (149 lb).       "

## 2025-04-30 NOTE — ANESTHESIA PREPROCEDURE EVALUATION
Anesthesia Pre-Procedure Evaluation    Patient: Yvonne Fatima   MRN: [id number] : 1940        Procedure : Procedure(s):  Left Atrial Appendage Closure          Past Medical History:   Diagnosis Date    Atrial fibrillation (H)     [id number] score of 7-on chronic warfarin Previously failed Sotalol and Multaq therapy PVI 3/11 Repeat PVI   Recurrent A fib, most recent CV 2013 Rx sotalol     Atrial flutter (H)     Atypical flutter with RVR after second PVI in 2011      Esophageal reflux     Created by Conversion     Essential hypertension          Status post catheter ablation of atrial fibrillation 6/3/2019    PVI 3/11(PVI/Cryo + CTI line) Repeat PVI  (PVI/RF + CFE + Roof line + GREGORIO line)      Past Surgical History:   Procedure Laterality Date    CV LEFT ATRIAL APPENDAGE CLOSURE Right 4/10/2024    Procedure: Left Atrial Appendage Closure;  Surgeon: Danyel Moon MD;  Location: [facility] CATH LAB CV    EP ABLATION PULMONARY VEIN ISOLATION N/A 2023    Procedure: Ablation Atrial Fibrillation;  Surgeon: Timur Corona MD;  Location: [facility] LAB CV    HC REMOVE TONSILS/ADENOIDS,<11 Y/O      Description: Tonsillectomy With Adenoidectomy;  Recorded: 10/17/2012;    HC REVISE MEDIAN N/CARPAL TUNNEL SURG      Description: Neuroplasty Decompression Median Nerve At Carpal Tunnel;  Recorded: 10/17/2012;    WV ABLATE HEART DYSRHYTHM FOCUS      Description: Catheter Ablation Atrial Fibrillation;  Recorded: 2013;  Comments: PVI 3-7-2011 (PVI/cryo + CTI line); ; Repeat PVI 2011 (PVI/RF + CFE + Roof line + GREGORIO line)    WV ABLATE HEART DYSRHYTHM FOCUS      Description: Catheter Ablation Atrial Fibrillation;  Recorded: 2014;  Comments: PVI 3-7-2011 (PVI/cryo + CTI line); ; Repeat PVI 2011 (PVI/RF + CFE + Roof line + GREGORIO line)    WV CARDIOVERSION ELECTIVE ARRHYTHMIA EXTERNAL       Fib 10/17/12; ; Flutter 2/15/13; AFl 2019      Allergies   Allergen Reactions    Pregabalin  Other (See Comments)     Foggy thinking    Ace Inhibitors Cough    Empagliflozin Other (See Comments)     weakness    Irbesartan Other (See Comments)     exhaustion    Metformin GI Disturbance     2013.  Retry 2016- gastrointestinal intolerance again    Pioglitazone Other (See Comments)     Hand leg and facial swelling    Semaglutide(0.25 Or 0.5mg-Dos) GI Disturbance      Social History     Tobacco Use    Smoking status: Former     Current packs/day: 0.00     Types: Cigarettes     Quit date: 1962     Years since quittin.0    Smokeless tobacco: Never   Substance Use Topics    Alcohol use: Yes     Comment: Alcoholic Drinks/day: 6 per month      Wt Readings from Last 1 Encounters:   25 67.6 kg (149 lb)        Anesthesia Evaluation   Pt has had prior anesthetic.         ROS/MED HX  ENT/Pulmonary:     (+) sleep apnea,                                       Neurologic:    (-) no CVA and no TIA   Cardiovascular: Comment: Interpretation Summary     The left ventricle is normal in size.  Left ventricular function is normal.The ejection fraction is 60-65%.  Left ventricular diastolic function is abnormal.  The right ventricle is mildly dilated.  The left atrium is moderately dilated. The right atrium is moderately dilated.  There is severe mitral annular calcification.  There is mild mitral stenosis.  There is moderate (2+) tricuspid regurgitation.  Right ventricular systolic pressure is elevated, consistent with mild  pulmonary hypertension.      (+)  hypertension- -   -  - -   Taking blood thinners                     dysrhythmias, a-fib,  valvular problems/murmurs  Mild MS.         METS/Exercise Tolerance:     Hematologic:       Musculoskeletal:       GI/Hepatic:     (+) GERD,                   Renal/Genitourinary:     (+) renal disease, type: CRI,            Endo:     (+)  type II DM,   Using insulin,          Obesity,       Psychiatric/Substance Use:       Infectious Disease:       Malignancy:       Other:  "           Physical Exam    Airway        Mallampati: II   TM distance: > 3 FB   Neck ROM: full     Respiratory Devices and Support         Dental       (+) Minor Abnormalities - some fillings, tiny chips      Cardiovascular          Rhythm and rate: regular     Pulmonary           breath sounds clear to auscultation           OUTSIDE LABS:  CBC:   Lab Results   Component Value Date    WBC 8.6 04/08/2024    WBC 9.2 09/06/2023    HGB 12.3 04/10/2024    HGB 12.6 04/08/2024    HCT 38.9 04/08/2024    HCT 38.9 09/06/2023     04/08/2024     09/06/2023     BMP:   Lab Results   Component Value Date     04/08/2024     (L) 09/06/2023    POTASSIUM 4.3 04/08/2024    POTASSIUM 4.0 09/06/2023    CHLORIDE 101 04/08/2024    CHLORIDE 99 09/06/2023    CO2 22 04/08/2024    CO2 21 (L) 09/06/2023    BUN 23.2 (H) 04/08/2024    BUN 20.2 09/06/2023    CR 1.0 04/11/2025    CR 0.84 04/10/2024     (H) 04/10/2024     (H) 04/10/2024     COAGS:   Lab Results   Component Value Date    PTT 38 04/10/2024    INR 2.69 (H) 04/10/2024     POC: No results found for: \"BGM\", \"HCG\", \"HCGS\"  HEPATIC: No results found for: \"ALBUMIN\", \"PROTTOTAL\", \"ALT\", \"AST\", \"GGT\", \"ALKPHOS\", \"BILITOTAL\", \"BILIDIRECT\", \"ABIMAEL\"  OTHER:   Lab Results   Component Value Date    HOLLIS 9.8 04/08/2024       Anesthesia Plan    ASA Status:  3    NPO Status:  NPO Appropriate    Anesthesia Type: MAC.              Consents    Anesthesia Plan(s) and associated risks, benefits, and realistic alternatives discussed. Questions answered and patient/representative(s) expressed understanding.     - Discussed: Risks, Benefits and Alternatives for BOTH SEDATION and the PROCEDURE were discussed     - Discussed with:  Patient            Postoperative Care            Comments:               Johnathan Grullon MD    I have reviewed the pertinent notes and labs in the chart from the past 30 days and (re)examined the patient.  Any updates or changes from those " "notes are reflected in this note.             # Drug Induced Coagulation Defect: home medication list includes an anticoagulant medication  # Drug Induced Platelet Defect: home medication list includes an antiplatelet medication  # Overweight: Estimated body mass index is 28.15 kg/m  as calculated from the following:    Height as of this encounter: 1.549 m (5' 1\").    Weight as of this encounter: 67.6 kg (149 lb).      "

## 2025-05-01 ENCOUNTER — NURSE TRIAGE (OUTPATIENT)
Dept: CARDIOLOGY | Facility: CLINIC | Age: 85
End: 2025-05-01
Payer: COMMERCIAL

## 2025-05-01 NOTE — TELEPHONE ENCOUNTER
PC back to pt  Discussed with pt HR in the 50's at rest is WNL  Pt denies any symptoms with HR in the 50's  Advised pt to continue Sotalol as ordered  She is aware of when to call with bradycardia symptoms  Pt verbalized understanding, has no further questions or concerns at this time, and has our contact information if needed.  5/1/2025 3:50 PM  Karina Maguire RN

## 2025-05-01 NOTE — TELEPHONE ENCOUNTER
Additional Information    Negative: Feeling weak or lightheaded (e.g., woozy, feeling like they might faint) AND heart beating very slowly (e.g., < 50 / minute)    Protocols used: Heart Rate and Heartbeat [id number]

## 2025-05-01 NOTE — CONFIDENTIAL NOTE
"Received call transferred to Triage from the [facility] for patient regarding low heart rates.    Patient had a Cardioversion on Wednesday 4/30/25.   Patient is taking sotalol 120mg 1/2 tab BID which she says she started on Monday.   Patient has noticed since today that her heart rates have been on the lower end in the 50's. At present while on this call at 3:10pm, her heart rate is 51, 57, 58 bpm. She says she is normally in the 60's.   Patient says she felt weak yesterday but today she is feeling asymptomatic.     Patient was advised this will be sent to the nurses with [facility] for further follow-up. She verbalized understanding.     1. DESCRIPTION: \"Please describe your heart rate or heartbeat that you are having\" (e.g., fast/slow, regular/irregular, skipped or extra beats, \"palpitations\") Low heart rates.  2. ONSET: \"When did it start?\" (e.g., minutes, hours, days) Noticed today.  3. DURATION: \"How long does it last\" (e.g., seconds, minutes, hours)  4. PATTERN \"Does it come and go, or has it been constant since it started?\" \"Does it get worse with exertion?\" \"Are you feeling it now?\" Asymptomatic currently.  5. TAP: \"Using your hand, can you tap out what you are feeling on a chair or table in front of you, so that I can hear?\" Note: Not all patients can do this.  6. HEART RATE: \"Can you tell me your heart rate?\" \"How many beats in 15 seconds?\" Note: Not all patients can do this. 51, 57, 58.  7. RECURRENT SYMPTOM: \"Have you ever had this before?\" If Yes, ask: \"When was the last time?\" and \"What happened that time?\" Since started sotalol.  8. CAUSE: \"What do you think is causing the palpitations?\" No palpitations.  9. CARDIAC HISTORY: \"Do you have any history of heart disease?\" (e.g., heart attack, angina, bypass surgery, angioplasty, arrhythmia) Cardioversion.  10. OTHER SYMPTOMS: \"Do you have any other symptoms?\" (e.g., dizziness, chest pain, sweating, difficulty breathing) Asymptomatic.  11. " "PREGNANCY: \"Is there any chance you are pregnant?\" \"When was your last menstrual period?\"      "

## 2025-06-18 ENCOUNTER — OFFICE VISIT (OUTPATIENT)
Dept: CARDIOLOGY | Facility: CLINIC | Age: 85
End: 2025-06-18
Payer: COMMERCIAL

## 2025-06-18 VITALS
WEIGHT: 151 LBS | HEIGHT: 61 IN | HEART RATE: 56 BPM | BODY MASS INDEX: 28.51 KG/M2 | DIASTOLIC BLOOD PRESSURE: 56 MMHG | RESPIRATION RATE: 16 BRPM | SYSTOLIC BLOOD PRESSURE: 138 MMHG

## 2025-06-18 DIAGNOSIS — I48.19 PERSISTENT ATRIAL FIBRILLATION (H): Primary | ICD-10-CM

## 2025-06-18 PROCEDURE — 99417 PROLNG OP E/M EACH 15 MIN: CPT | Performed by: INTERNAL MEDICINE

## 2025-06-18 PROCEDURE — G2211 COMPLEX E/M VISIT ADD ON: HCPCS | Performed by: INTERNAL MEDICINE

## 2025-06-18 PROCEDURE — 3078F DIAST BP <80 MM HG: CPT | Performed by: INTERNAL MEDICINE

## 2025-06-18 PROCEDURE — 99215 OFFICE O/P EST HI 40 MIN: CPT | Performed by: INTERNAL MEDICINE

## 2025-06-18 PROCEDURE — 3075F SYST BP GE 130 - 139MM HG: CPT | Performed by: INTERNAL MEDICINE

## 2025-06-18 RX ORDER — PREGABALIN 100 MG/1
100 CAPSULE ORAL
COMMUNITY
Start: 2025-05-29 | End: 2026-05-29

## 2025-06-18 NOTE — PATIENT INSTRUCTIONS
[facility]  Cardiac Electrophysiology  [address]  [place] [address]   Office: [phone]  Fax: [phone]       Thank you for seeing us in clinic today - it is a pleasure to be a part of your care team.  Below is a summary of our plan from today's visit.      Continue current meds  Follow up on an as needed basis    Please do not hesitate to be in touch with our office at [phone] with any questions that may arise.      Thank you for trusting us with your care,    Pepe Ospina MD  Clinical Cardiac Electrophysiology  [facility]  [address]  [place] [address]   Office: [phone]  Fax: [phone]

## 2025-06-18 NOTE — PROGRESS NOTES
HEART CARE ENCOUNTER CONSULTATON NOTE      [facility]  [phone]      Assessment/Recommendations   Assessment/Plan:    Yvonne Fatima is a very pleasant 85 year old female with past medical history significant for persistent atrial fibrillation and atrial flutter with prior ablations 2011 and 2023, percutaneous left atrial appendage closure in situ 4/10/2024, nonobstructive CAD, HTN, T2DM, SHAVON, TIA, CKD, right breast cancer with partial mastectomy 2/2025 who presents today to the EP clinic.    Persistent atrial fibrillation/flutter and tachycardia  - s/p extensive ablation in 2023 and previous ablation in 2011(PVI + roofline + GREGORIO line + CFE+ left atrial tachycardia)  - recurrent tachycardia with cardioversion done in April 2025  - during the last ablation severe loss of left atrial voltage  was noted with estimated viability <5%  - continue sotalol for now  - we discussed ablate and pace strategy, since she is doing well we will plan it should she have a recurrence on the future    2. Anticoagulation  - s/p LAAO in 2024    3. Sinus bradycardia with first degree AV delay  - asymptomatic  - monitor for now  - discussed pace and ablate strategy as of now      Time spent: 60 minutes spent on the date of the encounter doing chart review, history and exam, documentation and further activities as noted above.    The longitudinal plan of care for the condition(s) below were addressed during this visit. Due to the added complexity in care, I will continue support in the subsequent management of this condition(s) and with the ongoing continuity of care of this condition(s).        History of Present Illness/Subjective    HPI: Yvonne Fatima is a very pleasant 85 year old female with past medical history significant for persistent atrial fibrillation and atrial flutter with prior ablations 2011 and 2023, percutaneous left atrial appendage closure in situ 4/10/2024, nonobstructive CAD, HTN, T2DM,  "SHAVON, TIA, CKD, right breast cancer with partial mastectomy 2/2025 who presents today to the EP clinic.    Yvonne had PVI + roofline + GREGORIO line + CFE in 2011 and a left atrial tachycardia ablation done in 2023. She had a recurrent atrial flutter vs atrial tachycardia in April 2025 which was cardioverted.    She had an LAAO done in 2024.     She had 2  cataract procedures done in the last 2 weeks and is recovering from them.    No chest pain, shortness of breath, orthopnea, PND, presyncope or syncope.      Labs below reviewed personally     Physical Examination  Review of Systems   Vitals: /56 (BP Location: Left arm, Patient Position: Sitting, Cuff Size: Adult Regular)   Pulse 56   Resp 16   Ht 1.549 m (5' 1\")   Wt 68.5 kg (151 lb)   BMI 28.53 kg/m    BMI= Body mass index is 28.53 kg/m .  Wt Readings from Last 3 Encounters:   06/18/25 68.5 kg (151 lb)   04/30/25 67.6 kg (149 lb)   04/21/25 67.9 kg (149 lb 9.6 oz)       General Appearance:   no distress, normal body habitus   ENT/Mouth: membranes moist, no oral lesions or bleeding gums.      EYES:  no scleral icterus, normal conjunctivae   Neck: no carotid bruits or thyromegaly   Chest/Lungs:   lungs are clear to auscultation, no rales or wheezing, no sternal scar, equal chest wall expansion    Cardiovascular:   Regular. Normal first and second heart sounds with no murmurs, rubs, or gallops; the carotid, radial and posterior tibial pulses are intact, no edema bilaterally    Abdomen:  no organomegaly, masses, bruits, or tenderness; bowel sounds are present   Extremities: no cyanosis or clubbing   Skin: no xanthelasma, warm.    Neurologic: normal  bilateral, no tremors     Psychiatric: alert and oriented x3, calm        Please refer above for cardiac ROS details.        Medical History  Surgical History Family History Social History   Past Medical History:   Diagnosis Date    Atrial fibrillation (H)     [id number] score of 7-on chronic warfarin " Previously failed Sotalol and Multaq therapy PVI 3/11 Repeat PVI 7/11  Recurrent A fib, most recent CV Feb 2013 Rx sotalol     Atrial flutter (H)     Atypical flutter with RVR after second PVI in July 2011      Esophageal reflux     Created by Conversion     Essential hypertension          Status post catheter ablation of atrial fibrillation 6/3/2019    PVI 3/11(PVI/Cryo + CTI line) Repeat PVI 7/11 (PVI/RF + CFE + Roof line + GREGORIO line)     Past Surgical History:   Procedure Laterality Date    CV LEFT ATRIAL APPENDAGE CLOSURE Right 4/10/2024    Procedure: Left Atrial Appendage Closure;  Surgeon: Danyel Moon MD;  Location: [facility] CATH LAB CV    EP ABLATION PULMONARY VEIN ISOLATION N/A 9/6/2023    Procedure: Ablation Atrial Fibrillation;  Surgeon: Timur Corona MD;  Location: [facility] CATH LAB CV    HC REMOVE TONSILS/ADENOIDS,<13 Y/O      Description: Tonsillectomy With Adenoidectomy;  Recorded: 10/17/2012;    HC REVISE MEDIAN N/CARPAL TUNNEL SURG      Description: Neuroplasty Decompression Median Nerve At Carpal Tunnel;  Recorded: 10/17/2012;    OR ABLATE HEART DYSRHYTHM FOCUS      Description: Catheter Ablation Atrial Fibrillation;  Recorded: 06/20/2013;  Comments: PVI 3-7-2011 (PVI/cryo + CTI line); ; Repeat PVI July 2011 (PVI/RF + CFE + Roof line + GREGORIO line)    OR ABLATE HEART DYSRHYTHM FOCUS      Description: Catheter Ablation Atrial Fibrillation;  Recorded: 08/05/2014;  Comments: PVI 3-7-2011 (PVI/cryo + CTI line); ; Repeat PVI July 2011 (PVI/RF + CFE + Roof line + GREGORIO line)    OR CARDIOVERSION ELECTIVE ARRHYTHMIA EXTERNAL       Fib 10/17/12; ; Flutter 2/15/13; AFl 11/11/2019     Family History   Problem Relation Age of Onset    Aneurysm Mother         brain    Emphysema Father     Lung Cancer Sister     Hypertension Brother     Lung Cancer Sister     Lung Cancer Sister     Breast Cancer Sister         Social History     Socioeconomic History    Marital status:      Spouse name: Not on file     Number of children: 3    Years of education: Not on file    Highest education level: Not on file   Occupational History    Not on file   Tobacco Use    Smoking status: Former     Current packs/day: 0.00     Types: Cigarettes     Quit date: 1962     Years since quittin.2    Smokeless tobacco: Never   Vaping Use    Vaping status: Never Used   Substance and Sexual Activity    Alcohol use: Yes     Comment: Alcoholic Drinks/day: 6 per month    Drug use: No    Sexual activity: Yes     Partners: Male   Other Topics Concern    Not on file   Social History Narrative    Not on file     Social Drivers of Health     Financial Resource Strain: Not on file   Food Insecurity: No Food Insecurity (2024)    Received from [employer]    Hunger Vital Sign     Worried About Running Out of Food in the Last Year: Never true     Ran Out of Food in the Last Year: Never true   Transportation Needs: No Transportation Needs (2024)    Received from [employer]    PRAPARE - Transportation     Lack of Transportation (Medical): No     Lack of Transportation (Non-Medical): No   Physical Activity: Not on file   Stress: Not on file   Social Connections: Not on file   Interpersonal Safety: Low Risk  (2025)    Interpersonal Safety     Do you feel physically and emotionally safe where you currently live?: Yes     Within the past 12 months, have you been hit, slapped, kicked or otherwise physically hurt by someone?: No     Within the past 12 months, have you been humiliated or emotionally abused in other ways by your partner or ex-partner?: No   Housing Stability: Unknown (2024)    Received from [employer]    Housing Stability Vital Sign     Unable to Pay for Housing in the Last Year: No     Number of Places Lived in the Last Year: Not on file     Unstable Housing in the Last Year: No           Medications  Allergies   Current Outpatient Medications   Medication Sig Dispense Refill    allopurinol (ZYLOPRIM) 100 MG  tablet [ALLOPURINOL (ZYLOPRIM) 100 MG TABLET] Take 100 mg by mouth 2 (two) times a day.             aspirin 81 MG EC tablet Take 1 tablet (81 mg) by mouth daily      atorvastatin (LIPITOR) 40 MG tablet Take 1 tablet by mouth daily      BD SUELLEN U/F 32G X 4 MM insulin pen needle       cholecalciferol 50 MCG (2000 UT) CAPS Take 2,000 Units by mouth daily      cyanocobalamin (VITAMIN B-12) 1000 MCG tablet Take 1,000 mcg by mouth daily.      insulin glargine (LANTUS PEN) 100 UNIT/ML pen Inject 24 Units subcutaneously every morning. (Patient taking differently: Inject 30 Units subcutaneously every morning.)      NOVOLOG FLEXPEN 100 UNIT/ML soln INJECT THREE TIMES DAILY BEFORE MEALS: 1 UNIT IF GLUCOSE -170, 2 UNITS -200, 3 UNITS -230, 4 UNITS -260, 5 UNITS -290, 6 UNITS IF >290, MAX DOSE OF 18 UNITS DAILY.*      pregabalin (LYRICA) 100 MG capsule Take 100 mg by mouth.      sotalol (BETAPACE) 120 MG tablet Take 0.5 tablets (60 mg) by mouth 2 times daily. 30 tablet 11    telmisartan (MICARDIS) 40 MG tablet Take 40 mg by mouth daily.      apixaban ANTICOAGULANT (ELIQUIS) 5 MG tablet Take 1 tablet (5 mg) by mouth 2 times daily. (Patient not taking: Reported on 6/18/2025) 70 tablet 0    insulin degludec (TRESIBA FLEXTOUCH) 100 UNIT/ML pen Inject 20 Units subcutaneously. (Patient not taking: Reported on 6/18/2025)      Lancets (ONETOUCH DELICA PLUS [id number]) MISC Test once daily varying time, (Patient not taking: Reported on 6/18/2025)      metFORMIN (GLUCOPHAGE XR) 500 MG 24 hr tablet Take 500 mg by mouth daily (Patient not taking: Reported on 6/18/2025)      ONETOUCH VERIO IQ test strip  (Patient not taking: Reported on 6/18/2025)         Allergies   Allergen Reactions    Pregabalin Other (See Comments)     Foggy thinking    Ace Inhibitors Cough    Empagliflozin Other (See Comments)     weakness    Irbesartan Other (See Comments)     exhaustion    Metformin GI Disturbance     2013.  Retry 2016-  "gastrointestinal intolerance again    Pioglitazone Other (See Comments)     Hand leg and facial swelling    Semaglutide(0.25 Or 0.5mg-Dos) GI Disturbance          Lab Results    Chemistry/lipid CBC Cardiac Enzymes/BNP/TSH/INR   No results for input(s): \"CHOL\", \"HDL\", \"LDL\", \"TRIG\", \"CHOLHDLRATIO\" in the last 47184 hours.  No results for input(s): \"LDL\" in the last 82958 hours.  Recent Labs   Lab Test 04/30/25  0929 04/11/25  1311 04/10/24  1253 04/10/24  0716 04/08/24  1601   NA  --   --   --   --  137   POTASSIUM 4.4  --   --   --  4.3   CHLORIDE  --   --   --   --  101   CO2  --   --   --   --  22   GLC  --   --  225*   < > 332*   BUN  --   --   --   --  23.2*   CR  --  1.0  --    < > 1.00*   GFRESTIMATED  --  55*  --    < > 55*   HOLLIS  --   --   --   --  9.8    < > = values in this interval not displayed.     Recent Labs   Lab Test 04/11/25  1311 04/10/24  1016 04/08/24  1601   CR 1.0 0.84 1.00*     No results for input(s): \"A1C\" in the last 99056 hours.       Recent Labs   Lab Test 04/10/24  1016 04/08/24  1601   WBC  --  8.6   HGB 12.3 12.6   HCT  --  38.9   MCV  --  89   PLT  --  256     Recent Labs   Lab Test 04/10/24  1016 04/08/24  1601 09/06/23  1028   HGB 12.3 12.6 13.1    No results for input(s): \"TROPONINI\" in the last 08308 hours.  No results for input(s): \"BNP\", \"NTBNPI\", \"NTBNP\" in the last 67874 hours.  No results for input(s): \"TSH\" in the last 27552 hours.  Recent Labs   Lab Test 04/10/24  0716 04/08/24  1601 09/15/23  0000   INR 2.69* 2.87* 2.6*        Pepe Ospina MD                                      "

## 2025-06-18 NOTE — LETTER
6/18/2025    Rossy Yoon MD  [facility] [place] [address]  [facility] [address]    RE: Yvonneconcha Fatima       Dear Colleague,     I had the pleasure of seeing [facility] the [facility].    HEART CARE ENCOUNTER CONSULTATON NOTE      [facility]  [phone]      Assessment/Recommendations   Assessment/Plan:    Yvonne Fatima is a very pleasant 85 year old female with past medical history significant for persistent atrial fibrillation and atrial flutter with prior ablations 2011 and 2023, percutaneous left atrial appendage closure in situ 4/10/2024, nonobstructive CAD, HTN, T2DM, SHAVON, TIA, CKD, right breast cancer with partial mastectomy 2/2025 who presents today to the EP clinic.    Persistent atrial fibrillation/flutter and tachycardia  - s/p extensive ablation in 2023 and previous ablation in 2011(PVI + roofline + GREGORIO line + CFE+ left atrial tachycardia)  - recurrent tachycardia with cardioversion done in April 2025  - during the last ablation severe loss of left atrial voltage  was noted with estimated viability <5%  - continue sotalol for now  - we discussed ablate and pace strategy, since she is doing well we will plan it should she have a recurrence on the future    2. Anticoagulation  - s/p LAAO in 2024    3. Sinus bradycardia with first degree AV delay  - asymptomatic  - monitor for now  - discussed pace and ablate strategy as of now      Time spent: 60 minutes spent on the date of the encounter doing chart review, history and exam, documentation and further activities as noted above.    The longitudinal plan of care for the condition(s) below were addressed during this visit. Due to the added complexity in care, I will continue support in the subsequent management of this condition(s) and with the ongoing continuity of care of this condition(s).        History of Present Illness/Subjective    HPI: Yvonne Fatima is a very  "claire 85 year old female with past medical history significant for persistent atrial fibrillation and atrial flutter with prior ablations 2011 and 2023, percutaneous left atrial appendage closure in situ 4/10/2024, nonobstructive CAD, HTN, T2DM, SHAVON, TIA, CKD, right breast cancer with partial mastectomy 2/2025 who presents today to the EP clinic.    Yvonne had PVI + roofline + GREGORIO line + CFE in 2011 and a left atrial tachycardia ablation done in 2023. She had a recurrent atrial flutter vs atrial tachycardia in April 2025 which was cardioverted.    She had an LAAO done in 2024.     She had 2  cataract procedures done in the last 2 weeks and is recovering from them.    No chest pain, shortness of breath, orthopnea, PND, presyncope or syncope.      Labs below reviewed personally     Physical Examination  Review of Systems   Vitals: /56 (BP Location: Left arm, Patient Position: Sitting, Cuff Size: Adult Regular)   Pulse 56   Resp 16   Ht 1.549 m (5' 1\")   Wt 68.5 kg (151 lb)   BMI 28.53 kg/m    BMI= Body mass index is 28.53 kg/m .  Wt Readings from Last 3 Encounters:   06/18/25 68.5 kg (151 lb)   04/30/25 67.6 kg (149 lb)   04/21/25 67.9 kg (149 lb 9.6 oz)       General Appearance:   no distress, normal body habitus   ENT/Mouth: membranes moist, no oral lesions or bleeding gums.      EYES:  no scleral icterus, normal conjunctivae   Neck: no carotid bruits or thyromegaly   Chest/Lungs:   lungs are clear to auscultation, no rales or wheezing, no sternal scar, equal chest wall expansion    Cardiovascular:   Regular. Normal first and second heart sounds with no murmurs, rubs, or gallops; the carotid, radial and posterior tibial pulses are intact, no edema bilaterally    Abdomen:  no organomegaly, masses, bruits, or tenderness; bowel sounds are present   Extremities: no cyanosis or clubbing   Skin: no xanthelasma, warm.    Neurologic: normal  bilateral, no tremors     Psychiatric: alert and oriented x3, " calm        Please refer above for cardiac ROS details.        Medical History  Surgical History Family History Social History   Past Medical History:   Diagnosis Date     Atrial fibrillation (H)     [id number] score of 7-on chronic warfarin Previously failed Sotalol and Multaq therapy PVI 3/11 Repeat PVI 7/11  Recurrent A fib, most recent CV Feb 2013 Rx sotalol      Atrial flutter (H)     Atypical flutter with RVR after second PVI in July 2011       Esophageal reflux     Created by Conversion      Essential hypertension           Status post catheter ablation of atrial fibrillation 6/3/2019    PVI 3/11(PVI/Cryo + CTI line) Repeat PVI 7/11 (PVI/RF + CFE + Roof line + GREGORIO line)     Past Surgical History:   Procedure Laterality Date     CV LEFT ATRIAL APPENDAGE CLOSURE Right 4/10/2024    Procedure: Left Atrial Appendage Closure;  Surgeon: Danyel Moon MD;  Location: [facility] CATH LAB CV     EP ABLATION PULMONARY VEIN ISOLATION N/A 9/6/2023    Procedure: Ablation Atrial Fibrillation;  Surgeon: Timur Corona MD;  Location: [facility] CATH LAB CV     HC REMOVE TONSILS/ADENOIDS,<11 Y/O      Description: Tonsillectomy With Adenoidectomy;  Recorded: 10/17/2012;     HC REVISE MEDIAN N/CARPAL TUNNEL SURG      Description: Neuroplasty Decompression Median Nerve At Carpal Tunnel;  Recorded: 10/17/2012;     ID ABLATE HEART DYSRHYTHM FOCUS      Description: Catheter Ablation Atrial Fibrillation;  Recorded: 06/20/2013;  Comments: PVI 3-7-2011 (PVI/cryo + CTI line); ; Repeat PVI July 2011 (PVI/RF + CFE + Roof line + GREGORIO line)     ID ABLATE HEART DYSRHYTHM FOCUS      Description: Catheter Ablation Atrial Fibrillation;  Recorded: 08/05/2014;  Comments: PVI 3-7-2011 (PVI/cryo + CTI line); ; Repeat PVI July 2011 (PVI/RF + CFE + Roof line + GREGORIO line)     ID CARDIOVERSION ELECTIVE ARRHYTHMIA EXTERNAL       Fib 10/17/12; ; Flutter 2/15/13; AFl 11/11/2019     Family History   Problem Relation Age of Onset     Aneurysm Mother          brain     Emphysema Father      Lung Cancer Sister      Hypertension Brother      Lung Cancer Sister      Lung Cancer Sister      Breast Cancer Sister         Social History     Socioeconomic History     Marital status:      Spouse name: Not on file     Number of children: 3     Years of education: Not on file     Highest education level: Not on file   Occupational History     Not on file   Tobacco Use     Smoking status: Former     Current packs/day: 0.00     Types: Cigarettes     Quit date: 1962     Years since quittin.2     Smokeless tobacco: Never   Vaping Use     Vaping status: Never Used   Substance and Sexual Activity     Alcohol use: Yes     Comment: Alcoholic Drinks/day: 6 per month     Drug use: No     Sexual activity: Yes     Partners: Male   Other Topics Concern     Not on file   Social History Narrative     Not on file     Social Drivers of Health     Financial Resource Strain: Not on file   Food Insecurity: No Food Insecurity (2024)    Received from [facility]    Hunger Vital Sign      Worried About Running Out of Food in the Last Year: Never true      Ran Out of Food in the Last Year: Never true   Transportation Needs: No Transportation Needs (2024)    Received from [facility]    PRAPARE - Transportation      Lack of Transportation (Medical): No      Lack of Transportation (Non-Medical): No   Physical Activity: Not on file   Stress: Not on file   Social Connections: Not on file   Interpersonal Safety: Low Risk  (2025)    Interpersonal Safety      Do you feel physically and emotionally safe where you currently live?: Yes      Within the past 12 months, have you been hit, slapped, kicked or otherwise physically hurt by someone?: No      Within the past 12 months, have you been humiliated or emotionally abused in other ways by your partner or ex-partner?: No   Housing Stability: Unknown (2024)    Received from [facility]    Housing Stability Vital Sign       Unable to Pay for Housing in the Last Year: No      Number of Places Lived in the Last Year: Not on file      Unstable Housing in the Last Year: No           Medications  Allergies   Current Outpatient Medications   Medication Sig Dispense Refill     allopurinol (ZYLOPRIM) 100 MG tablet [ALLOPURINOL (ZYLOPRIM) 100 MG TABLET] Take 100 mg by mouth 2 (two) times a day.              aspirin 81 MG EC tablet Take 1 tablet (81 mg) by mouth daily       atorvastatin (LIPITOR) 40 MG tablet Take 1 tablet by mouth daily       BD SUELLEN U/F 32G X 4 MM insulin pen needle        cholecalciferol 50 MCG (2000 UT) CAPS Take 2,000 Units by mouth daily       cyanocobalamin (VITAMIN B-12) 1000 MCG tablet Take 1,000 mcg by mouth daily.       insulin glargine (LANTUS PEN) 100 UNIT/ML pen Inject 24 Units subcutaneously every morning. (Patient taking differently: Inject 30 Units subcutaneously every morning.)       NOVOLOG FLEXPEN 100 UNIT/ML soln INJECT THREE TIMES DAILY BEFORE MEALS: 1 UNIT IF GLUCOSE -170, 2 UNITS -200, 3 UNITS -230, 4 UNITS -260, 5 UNITS -290, 6 UNITS IF >290, MAX DOSE OF 18 UNITS DAILY.*       pregabalin (LYRICA) 100 MG capsule Take 100 mg by mouth.       sotalol (BETAPACE) 120 MG tablet Take 0.5 tablets (60 mg) by mouth 2 times daily. 30 tablet 11     telmisartan (MICARDIS) 40 MG tablet Take 40 mg by mouth daily.       apixaban ANTICOAGULANT (ELIQUIS) 5 MG tablet Take 1 tablet (5 mg) by mouth 2 times daily. (Patient not taking: Reported on 6/18/2025) 70 tablet 0     insulin degludec (TRESIBA FLEXTOUCH) 100 UNIT/ML pen Inject 20 Units subcutaneously. (Patient not taking: Reported on 6/18/2025)       Lancets (ONETOUCH DELICA PLUS [id number]) MISC Test once daily varying time, (Patient not taking: Reported on 6/18/2025)       metFORMIN (GLUCOPHAGE XR) 500 MG 24 hr tablet Take 500 mg by mouth daily (Patient not taking: Reported on 6/18/2025)       ONETOUCH VERIO IQ test strip  (Patient not  "taking: Reported on 6/18/2025)         Allergies   Allergen Reactions     Pregabalin Other (See Comments)     Foggy thinking     Ace Inhibitors Cough     Empagliflozin Other (See Comments)     weakness     Irbesartan Other (See Comments)     exhaustion     Metformin GI Disturbance     2013.  Retry 2016- gastrointestinal intolerance again     Pioglitazone Other (See Comments)     Hand leg and facial swelling     Semaglutide(0.25 Or 0.5mg-Dos) GI Disturbance          Lab Results    Chemistry/lipid CBC Cardiac Enzymes/BNP/TSH/INR   No results for input(s): \"CHOL\", \"HDL\", \"LDL\", \"TRIG\", \"CHOLHDLRATIO\" in the last 15602 hours.  No results for input(s): \"LDL\" in the last 40644 hours.  Recent Labs   Lab Test 04/30/25  0929 04/11/25  1311 04/10/24  1253 04/10/24  0716 04/08/24  1601   NA  --   --   --   --  137   POTASSIUM 4.4  --   --   --  4.3   CHLORIDE  --   --   --   --  101   CO2  --   --   --   --  22   GLC  --   --  225*   < > 332*   BUN  --   --   --   --  23.2*   CR  --  1.0  --    < > 1.00*   GFRESTIMATED  --  55*  --    < > 55*   HOLLIS  --   --   --   --  9.8    < > = values in this interval not displayed.     Recent Labs   Lab Test 04/11/25  1311 04/10/24  1016 04/08/24  1601   CR 1.0 0.84 1.00*     No results for input(s): \"A1C\" in the last 17104 hours.       Recent Labs   Lab Test 04/10/24  1016 04/08/24  1601   WBC  --  8.6   HGB 12.3 12.6   HCT  --  38.9   MCV  --  89   PLT  --  256     Recent Labs   Lab Test 04/10/24  1016 04/08/24  1601 09/06/23  1028   HGB 12.3 12.6 13.1    No results for input(s): \"TROPONINI\" in the last 73639 hours.  No results for input(s): \"BNP\", \"NTBNPI\", \"NTBNP\" in the last 02535 hours.  No results for input(s): \"TSH\" in the last 26743 hours.  Recent Labs   Lab Test 04/10/24  0716 04/08/24  1601 09/15/23  0000   INR 2.69* 2.87* 2.6*        Pepe Ospina MD                                        Thank you for allowing me to participate in the care of your patient.      Sincerely, "     Pepe Ospina MD     [facility]  cc:   Millie Randall PA-C  [address]  [place] [address]

## 2025-06-29 ENCOUNTER — HEALTH MAINTENANCE LETTER (OUTPATIENT)
Age: 85
End: 2025-06-29

## (undated) DEVICE — TRANSDUCER TRAY ARTERIAL 42646-06

## (undated) DEVICE — SHEATH PINNACLE 9/25/038 RSS905

## (undated) DEVICE — CATH MAPPING ADVISOR HD GRID SE D-AVHD-DF16

## (undated) DEVICE — TUBING KIT COOL POINT

## (undated) DEVICE — MANIFOLD KIT ANGIO AUTOMATED 014613

## (undated) DEVICE — INTRO MICRO MINI STICK 4FR STIFF NITINOL 45-753

## (undated) DEVICE — CATH DECAPOLAR INQUIRY LG 110CM 81104

## (undated) DEVICE — SHEATH GUIDING VERSACROSS D1 CURVE L85 CM L180 CBL VXAK0003

## (undated) DEVICE — ELECTRODE DEFIB CADENCE 22550R

## (undated) DEVICE — SHEATH INTRODUCER DRYSEAL FLEX W/HYDRO CT 16FRX33CM DSF1633

## (undated) DEVICE — SYR ANGIOGRAPHY MULTIUSE KIT ACIST 014612

## (undated) DEVICE — KIT HAND CONTROL ACIST 014644 AR-P54

## (undated) DEVICE — GUIDEWIRE JTIP 3MM .035 180CM IQ35F180J3

## (undated) DEVICE — CLOSURE DEVICE 6FR VASC PROGLIDE MEDICATED SUTURE 12673-03

## (undated) DEVICE — CUSTOM PACK CORONARY SAN5BCRHEA

## (undated) DEVICE — CATHETER IRRIGATED ABLATION BIDIRECTIONAL D-F CURVE 8FR

## (undated) DEVICE — CUSTOM PACK EP

## (undated) DEVICE — INTRO MICRO MINI STICK 5FR STIFF NITINOL

## (undated) DEVICE — SHEATH AGILIS 8.5FR X 71CM 408310

## (undated) DEVICE — CATH TRANSSEPTAL VERSACROSS 45D 63X230CM VXSK0032

## (undated) DEVICE — KIT ENSITE SURFACE ELECTRODE ENSITE-SEK-5-01

## (undated) DEVICE — SHEATH WITH DILATOR ACCESS SYSTEM FXD CRV DBL M635TU80020

## (undated) DEVICE — CATH ANGIO INFINITI PIGTAIL 155 6 SH 6FRX110CM 534654S

## (undated) DEVICE — M ESOPHAGEAL TEMPERATURE PROBE, SINGLE USE, STERILE, 10FR. OD

## (undated) DEVICE — INTRO SHEATH 8FRX10CM PINNACLE RSS802

## (undated) DEVICE — INTRO SHEATH TERUMO 10FRX25CM PINNACLE RSS006

## (undated) DEVICE — CATHETER ICE VIEWFLEX XTRA

## (undated) RX ORDER — METHOHEXITAL IN WATER/PF 100MG/10ML
SYRINGE (ML) INTRAVENOUS
Status: DISPENSED
Start: 2021-09-03

## (undated) RX ORDER — PROPOFOL 10 MG/ML
INJECTION, EMULSION INTRAVENOUS
Status: DISPENSED
Start: 2024-04-10

## (undated) RX ORDER — ONDANSETRON 2 MG/ML
INJECTION INTRAMUSCULAR; INTRAVENOUS
Status: DISPENSED
Start: 2024-04-10

## (undated) RX ORDER — LIDOCAINE HYDROCHLORIDE 10 MG/ML
INJECTION, SOLUTION EPIDURAL; INFILTRATION; INTRACAUDAL; PERINEURAL
Status: DISPENSED
Start: 2024-04-10

## (undated) RX ORDER — FENTANYL CITRATE 50 UG/ML
INJECTION, SOLUTION INTRAMUSCULAR; INTRAVENOUS
Status: DISPENSED
Start: 2023-09-06

## (undated) RX ORDER — FENTANYL CITRATE 50 UG/ML
INJECTION, SOLUTION INTRAMUSCULAR; INTRAVENOUS
Status: DISPENSED
Start: 2024-04-10

## (undated) RX ORDER — PROTAMINE SULFATE 10 MG/ML
INJECTION, SOLUTION INTRAVENOUS
Status: DISPENSED
Start: 2023-09-06

## (undated) RX ORDER — HEPARIN SODIUM 10000 [USP'U]/100ML
INJECTION, SOLUTION INTRAVENOUS
Status: DISPENSED
Start: 2023-09-06

## (undated) RX ORDER — ASPIRIN 81 MG/1
TABLET ORAL
Status: DISPENSED
Start: 2024-04-10

## (undated) RX ORDER — ISOPROTERENOL HYDROCHLORIDE 0.2 MG/ML
INJECTION, SOLUTION INTRAVENOUS
Status: DISPENSED
Start: 2023-09-06

## (undated) RX ORDER — PROTAMINE SULFATE 10 MG/ML
INJECTION, SOLUTION INTRAVENOUS
Status: DISPENSED
Start: 2024-04-10